# Patient Record
Sex: MALE | Race: BLACK OR AFRICAN AMERICAN | NOT HISPANIC OR LATINO | Employment: OTHER | URBAN - METROPOLITAN AREA
[De-identification: names, ages, dates, MRNs, and addresses within clinical notes are randomized per-mention and may not be internally consistent; named-entity substitution may affect disease eponyms.]

---

## 2017-06-21 PROCEDURE — 99291 CRITICAL CARE FIRST HOUR: CPT | Mod: ,,, | Performed by: EMERGENCY MEDICINE

## 2017-06-21 PROCEDURE — 99291 CRITICAL CARE FIRST HOUR: CPT | Mod: 25

## 2017-06-21 PROCEDURE — 82962 GLUCOSE BLOOD TEST: CPT

## 2017-06-21 PROCEDURE — 96374 THER/PROPH/DIAG INJ IV PUSH: CPT

## 2017-06-21 PROCEDURE — 93005 ELECTROCARDIOGRAM TRACING: CPT

## 2017-06-22 ENCOUNTER — HOSPITAL ENCOUNTER (INPATIENT)
Facility: HOSPITAL | Age: 82
LOS: 7 days | Discharge: NURSING, REHAB OR LONG-TERM CARE | DRG: 064 | End: 2017-06-29
Attending: EMERGENCY MEDICINE | Admitting: PSYCHIATRY & NEUROLOGY
Payer: MEDICARE

## 2017-06-22 DIAGNOSIS — E11.42 TYPE 2 DIABETES MELLITUS WITH DIABETIC POLYNEUROPATHY, WITH LONG-TERM CURRENT USE OF INSULIN: Primary | ICD-10-CM

## 2017-06-22 DIAGNOSIS — I42.9 CARDIOMYOPATHY: ICD-10-CM

## 2017-06-22 DIAGNOSIS — G93.40 ENCEPHALOPATHY: ICD-10-CM

## 2017-06-22 DIAGNOSIS — Z79.4 TYPE 2 DIABETES MELLITUS WITH DIABETIC POLYNEUROPATHY, WITH LONG-TERM CURRENT USE OF INSULIN: Primary | ICD-10-CM

## 2017-06-22 DIAGNOSIS — F03.90 DEMENTIA WITHOUT BEHAVIORAL DISTURBANCE, UNSPECIFIED DEMENTIA TYPE: ICD-10-CM

## 2017-06-22 DIAGNOSIS — R29.810 FACIAL DROOP: ICD-10-CM

## 2017-06-22 DIAGNOSIS — G81.94 HEMIPARESIS, LEFT: ICD-10-CM

## 2017-06-22 DIAGNOSIS — I63.331 THROMBOTIC STROKE INVOLVING RIGHT POSTERIOR CEREBRAL ARTERY: ICD-10-CM

## 2017-06-22 DIAGNOSIS — F02.80 LATE ONSET ALZHEIMER'S DISEASE WITHOUT BEHAVIORAL DISTURBANCE: ICD-10-CM

## 2017-06-22 DIAGNOSIS — G81.94 ACUTE LEFT HEMIPARESIS: ICD-10-CM

## 2017-06-22 DIAGNOSIS — G30.1 LATE ONSET ALZHEIMER'S DISEASE WITHOUT BEHAVIORAL DISTURBANCE: ICD-10-CM

## 2017-06-22 PROBLEM — G93.6 CYTOTOXIC CEREBRAL EDEMA: Status: ACTIVE | Noted: 2017-06-22

## 2017-06-22 PROBLEM — I63.81 THALAMIC INFARCT, ACUTE: Status: ACTIVE | Noted: 2017-06-22

## 2017-06-22 PROBLEM — I67.89 CEREBRAL MICROVASCULAR DISEASE: Status: ACTIVE | Noted: 2017-06-22

## 2017-06-22 PROBLEM — I10 ESSENTIAL HYPERTENSION: Status: ACTIVE | Noted: 2017-06-22

## 2017-06-22 PROBLEM — I51.89 COMBINED SYSTOLIC AND DIASTOLIC CARDIAC DYSFUNCTION: Status: ACTIVE | Noted: 2017-06-22

## 2017-06-22 LAB
ALBUMIN SERPL BCP-MCNC: 3.4 G/DL
ALP SERPL-CCNC: 184 U/L
ALT SERPL W/O P-5'-P-CCNC: 18 U/L
ANION GAP SERPL CALC-SCNC: 6 MMOL/L
APTT BLDCRRT: 21.8 SEC
AST SERPL-CCNC: 25 U/L
BACTERIA #/AREA URNS AUTO: NORMAL /HPF
BASOPHILS # BLD AUTO: 0.02 K/UL
BASOPHILS NFR BLD: 0.3 %
BILIRUB SERPL-MCNC: 0.7 MG/DL
BILIRUB UR QL STRIP: NEGATIVE
BUN SERPL-MCNC: 17 MG/DL
BUN SERPL-MCNC: 20 MG/DL (ref 6–30)
CALCIUM SERPL-MCNC: 9.4 MG/DL
CHLORIDE SERPL-SCNC: 102 MMOL/L (ref 95–110)
CHLORIDE SERPL-SCNC: 109 MMOL/L
CHOLEST/HDLC SERPL: 3.2 {RATIO}
CK MB SERPL-MCNC: 2.6 NG/ML
CK MB SERPL-RTO: 2.7 %
CK SERPL-CCNC: 95 U/L
CLARITY UR REFRACT.AUTO: CLEAR
CO2 SERPL-SCNC: 24 MMOL/L
COLOR UR AUTO: YELLOW
CREAT SERPL-MCNC: 1.4 MG/DL (ref 0.5–1.4)
CREAT SERPL-MCNC: 1.5 MG/DL (ref 0.5–1.4)
CREAT SERPL-MCNC: 1.6 MG/DL
DIASTOLIC DYSFUNCTION: YES
DIFFERENTIAL METHOD: ABNORMAL
EOSINOPHIL # BLD AUTO: 0.1 K/UL
EOSINOPHIL NFR BLD: 1 %
ERYTHROCYTE [DISTWIDTH] IN BLOOD BY AUTOMATED COUNT: 12.7 %
EST. GFR  (AFRICAN AMERICAN): 45.1 ML/MIN/1.73 M^2
EST. GFR  (NON AFRICAN AMERICAN): 39 ML/MIN/1.73 M^2
ESTIMATED AVG GLUCOSE: 235 MG/DL
ESTIMATED PA SYSTOLIC PRESSURE: 18.49
GLUCOSE SERPL-MCNC: 219 MG/DL
GLUCOSE SERPL-MCNC: 219 MG/DL (ref 70–110)
GLUCOSE UR QL STRIP: ABNORMAL
HBA1C MFR BLD HPLC: 9.8 %
HCT VFR BLD AUTO: 41.2 %
HCT VFR BLD CALC: 43 %PCV (ref 36–54)
HDL/CHOLESTEROL RATIO: 31.6 %
HDLC SERPL-MCNC: 133 MG/DL
HDLC SERPL-MCNC: 42 MG/DL
HGB BLD-MCNC: 14.1 G/DL
HGB UR QL STRIP: NEGATIVE
HYALINE CASTS UR QL AUTO: 1 /LPF
INR PPP: 1
INR PPP: 1
KETONES UR QL STRIP: NEGATIVE
LDLC SERPL CALC-MCNC: 70.6 MG/DL
LEUKOCYTE ESTERASE UR QL STRIP: NEGATIVE
LYMPHOCYTES # BLD AUTO: 1.9 K/UL
LYMPHOCYTES NFR BLD: 25.5 %
MCH RBC QN AUTO: 27.8 PG
MCHC RBC AUTO-ENTMCNC: 34.2 %
MCV RBC AUTO: 81 FL
MICROSCOPIC COMMENT: NORMAL
MITRAL VALVE REGURGITATION: ABNORMAL
MONOCYTES # BLD AUTO: 0.4 K/UL
MONOCYTES NFR BLD: 4.8 %
NEUTROPHILS # BLD AUTO: 5 K/UL
NEUTROPHILS NFR BLD: 68.3 %
NITRITE UR QL STRIP: NEGATIVE
NONHDLC SERPL-MCNC: 91 MG/DL
PH UR STRIP: 5 [PH] (ref 5–8)
PLATELET # BLD AUTO: 209 K/UL
PMV BLD AUTO: 10.2 FL
POC IONIZED CALCIUM: 1.27 MMOL/L (ref 1.06–1.42)
POC PTINR: 0.9 (ref 0.9–1.2)
POC PTWBT: 10.9 SEC (ref 9.7–14.3)
POC TCO2 (MEASURED): 29 MMOL/L (ref 23–29)
POCT GLUCOSE: 106 MG/DL (ref 70–110)
POCT GLUCOSE: 123 MG/DL (ref 70–110)
POCT GLUCOSE: 214 MG/DL (ref 70–110)
POCT GLUCOSE: 245 MG/DL (ref 70–110)
POCT GLUCOSE: 245 MG/DL (ref 70–110)
POTASSIUM BLD-SCNC: 4.5 MMOL/L (ref 3.5–5.1)
POTASSIUM SERPL-SCNC: 4.8 MMOL/L
PROT SERPL-MCNC: 7.3 G/DL
PROT UR QL STRIP: NEGATIVE
PROTHROMBIN TIME: 10.6 SEC
PROTHROMBIN TIME: 10.9 SEC
RBC # BLD AUTO: 5.08 M/UL
RBC #/AREA URNS AUTO: 0 /HPF (ref 0–4)
RETIRED EF AND QEF - SEE NOTES: 43 (ref 55–65)
SAMPLE: ABNORMAL
SAMPLE: NORMAL
SAMPLE: NORMAL
SODIUM BLD-SCNC: 142 MMOL/L (ref 136–145)
SODIUM SERPL-SCNC: 139 MMOL/L
SP GR UR STRIP: 1.01 (ref 1–1.03)
TRICUSPID VALVE REGURGITATION: ABNORMAL
TRIGL SERPL-MCNC: 102 MG/DL
TROPONIN I SERPL DL<=0.01 NG/ML-MCNC: <0.006 NG/ML
TSH SERPL DL<=0.005 MIU/L-ACNC: 1.71 UIU/ML
URN SPEC COLLECT METH UR: ABNORMAL
UROBILINOGEN UR STRIP-ACNC: NEGATIVE EU/DL
WBC # BLD AUTO: 7.28 K/UL
WBC #/AREA URNS AUTO: 1 /HPF (ref 0–5)
YEAST UR QL AUTO: NORMAL

## 2017-06-22 PROCEDURE — 85610 PROTHROMBIN TIME: CPT

## 2017-06-22 PROCEDURE — 93010 ELECTROCARDIOGRAM REPORT: CPT | Mod: S$GLB,,, | Performed by: INTERNAL MEDICINE

## 2017-06-22 PROCEDURE — 20600001 HC STEP DOWN PRIVATE ROOM

## 2017-06-22 PROCEDURE — 97166 OT EVAL MOD COMPLEX 45 MIN: CPT

## 2017-06-22 PROCEDURE — 81001 URINALYSIS AUTO W/SCOPE: CPT

## 2017-06-22 PROCEDURE — 99223 1ST HOSP IP/OBS HIGH 75: CPT | Mod: GC,,, | Performed by: PSYCHIATRY & NEUROLOGY

## 2017-06-22 PROCEDURE — 85025 COMPLETE CBC W/AUTO DIFF WBC: CPT

## 2017-06-22 PROCEDURE — 92523 SPEECH SOUND LANG COMPREHEN: CPT

## 2017-06-22 PROCEDURE — G8997 SWALLOW GOAL STATUS: HCPCS | Mod: CH

## 2017-06-22 PROCEDURE — 63600175 PHARM REV CODE 636 W HCPCS

## 2017-06-22 PROCEDURE — 63600175 PHARM REV CODE 636 W HCPCS: Performed by: NURSE PRACTITIONER

## 2017-06-22 PROCEDURE — 80053 COMPREHEN METABOLIC PANEL: CPT

## 2017-06-22 PROCEDURE — 97162 PT EVAL MOD COMPLEX 30 MIN: CPT

## 2017-06-22 PROCEDURE — 82553 CREATINE MB FRACTION: CPT

## 2017-06-22 PROCEDURE — 83036 HEMOGLOBIN GLYCOSYLATED A1C: CPT

## 2017-06-22 PROCEDURE — 25000003 PHARM REV CODE 250: Performed by: PSYCHIATRY & NEUROLOGY

## 2017-06-22 PROCEDURE — 99900035 HC TECH TIME PER 15 MIN (STAT)

## 2017-06-22 PROCEDURE — G8996 SWALLOW CURRENT STATUS: HCPCS | Mod: CI

## 2017-06-22 PROCEDURE — 85610 PROTHROMBIN TIME: CPT | Mod: 91

## 2017-06-22 PROCEDURE — 84443 ASSAY THYROID STIM HORMONE: CPT

## 2017-06-22 PROCEDURE — 97535 SELF CARE MNGMENT TRAINING: CPT

## 2017-06-22 PROCEDURE — 25000003 PHARM REV CODE 250: Performed by: NURSE PRACTITIONER

## 2017-06-22 PROCEDURE — 80061 LIPID PANEL: CPT

## 2017-06-22 PROCEDURE — 85730 THROMBOPLASTIN TIME PARTIAL: CPT

## 2017-06-22 PROCEDURE — 84484 ASSAY OF TROPONIN QUANT: CPT

## 2017-06-22 PROCEDURE — 99222 1ST HOSP IP/OBS MODERATE 55: CPT | Mod: ,,, | Performed by: NURSE PRACTITIONER

## 2017-06-22 PROCEDURE — 25500020 PHARM REV CODE 255: Performed by: EMERGENCY MEDICINE

## 2017-06-22 PROCEDURE — 97803 MED NUTRITION INDIV SUBSEQ: CPT

## 2017-06-22 PROCEDURE — 87086 URINE CULTURE/COLONY COUNT: CPT

## 2017-06-22 PROCEDURE — 82565 ASSAY OF CREATININE: CPT

## 2017-06-22 PROCEDURE — 93306 TTE W/DOPPLER COMPLETE: CPT | Mod: 26,,, | Performed by: INTERNAL MEDICINE

## 2017-06-22 PROCEDURE — 93306 TTE W/DOPPLER COMPLETE: CPT

## 2017-06-22 PROCEDURE — 97530 THERAPEUTIC ACTIVITIES: CPT

## 2017-06-22 PROCEDURE — 92610 EVALUATE SWALLOWING FUNCTION: CPT

## 2017-06-22 RX ORDER — IBUPROFEN 200 MG
24 TABLET ORAL
Status: DISCONTINUED | OUTPATIENT
Start: 2017-06-22 | End: 2017-06-29 | Stop reason: HOSPADM

## 2017-06-22 RX ORDER — LISINOPRIL 5 MG/1
5 TABLET ORAL DAILY
Status: DISCONTINUED | OUTPATIENT
Start: 2017-06-22 | End: 2017-06-23

## 2017-06-22 RX ORDER — ENOXAPARIN SODIUM 100 MG/ML
40 INJECTION SUBCUTANEOUS EVERY 24 HOURS
Status: DISCONTINUED | OUTPATIENT
Start: 2017-06-22 | End: 2017-06-29 | Stop reason: HOSPADM

## 2017-06-22 RX ORDER — ATORVASTATIN CALCIUM 20 MG/1
40 TABLET, FILM COATED ORAL DAILY
Status: DISCONTINUED | OUTPATIENT
Start: 2017-06-22 | End: 2017-06-29 | Stop reason: HOSPADM

## 2017-06-22 RX ORDER — METOPROLOL SUCCINATE 25 MG/1
25 TABLET, EXTENDED RELEASE ORAL DAILY
Status: DISCONTINUED | OUTPATIENT
Start: 2017-06-22 | End: 2017-06-29 | Stop reason: HOSPADM

## 2017-06-22 RX ORDER — NAPROXEN SODIUM 220 MG/1
81 TABLET, FILM COATED ORAL DAILY
Status: DISCONTINUED | OUTPATIENT
Start: 2017-06-22 | End: 2017-06-22

## 2017-06-22 RX ORDER — CLOPIDOGREL BISULFATE 75 MG/1
300 TABLET ORAL ONCE
Status: COMPLETED | OUTPATIENT
Start: 2017-06-22 | End: 2017-06-22

## 2017-06-22 RX ORDER — ACETAMINOPHEN 325 MG/1
650 TABLET ORAL EVERY 6 HOURS PRN
Status: DISCONTINUED | OUTPATIENT
Start: 2017-06-22 | End: 2017-06-26

## 2017-06-22 RX ORDER — MIDAZOLAM HYDROCHLORIDE 1 MG/ML
0.5 INJECTION INTRAMUSCULAR; INTRAVENOUS
Status: COMPLETED | OUTPATIENT
Start: 2017-06-22 | End: 2017-06-22

## 2017-06-22 RX ORDER — LABETALOL HYDROCHLORIDE 5 MG/ML
10 INJECTION, SOLUTION INTRAVENOUS EVERY 6 HOURS PRN
Status: DISCONTINUED | OUTPATIENT
Start: 2017-06-22 | End: 2017-06-29 | Stop reason: HOSPADM

## 2017-06-22 RX ORDER — MEMANTINE HYDROCHLORIDE 10 MG/1
10 TABLET ORAL 2 TIMES DAILY
Status: DISCONTINUED | OUTPATIENT
Start: 2017-06-22 | End: 2017-06-29 | Stop reason: HOSPADM

## 2017-06-22 RX ORDER — POLYETHYLENE GLYCOL 3350 17 G/17G
17 POWDER, FOR SOLUTION ORAL DAILY PRN
Status: DISCONTINUED | OUTPATIENT
Start: 2017-06-22 | End: 2017-06-29 | Stop reason: HOSPADM

## 2017-06-22 RX ORDER — DONEPEZIL HYDROCHLORIDE 5 MG/1
10 TABLET, FILM COATED ORAL DAILY
Status: DISCONTINUED | OUTPATIENT
Start: 2017-06-22 | End: 2017-06-29 | Stop reason: HOSPADM

## 2017-06-22 RX ORDER — MIDAZOLAM HYDROCHLORIDE 1 MG/ML
INJECTION INTRAMUSCULAR; INTRAVENOUS
Status: COMPLETED
Start: 2017-06-22 | End: 2017-06-22

## 2017-06-22 RX ORDER — CLOPIDOGREL BISULFATE 75 MG/1
75 TABLET ORAL DAILY
Status: DISCONTINUED | OUTPATIENT
Start: 2017-06-23 | End: 2017-06-29 | Stop reason: HOSPADM

## 2017-06-22 RX ORDER — IBUPROFEN 200 MG
16 TABLET ORAL
Status: DISCONTINUED | OUTPATIENT
Start: 2017-06-22 | End: 2017-06-29 | Stop reason: HOSPADM

## 2017-06-22 RX ORDER — ONDANSETRON 2 MG/ML
4 INJECTION INTRAMUSCULAR; INTRAVENOUS EVERY 12 HOURS PRN
Status: DISCONTINUED | OUTPATIENT
Start: 2017-06-22 | End: 2017-06-29 | Stop reason: HOSPADM

## 2017-06-22 RX ORDER — AMLODIPINE BESYLATE 10 MG/1
10 TABLET ORAL DAILY
Status: DISCONTINUED | OUTPATIENT
Start: 2017-06-22 | End: 2017-06-29 | Stop reason: HOSPADM

## 2017-06-22 RX ORDER — SODIUM CHLORIDE 0.9 % (FLUSH) 0.9 %
3 SYRINGE (ML) INJECTION EVERY 8 HOURS
Status: DISCONTINUED | OUTPATIENT
Start: 2017-06-22 | End: 2017-06-29 | Stop reason: HOSPADM

## 2017-06-22 RX ORDER — INSULIN ASPART 100 [IU]/ML
0-5 INJECTION, SOLUTION INTRAVENOUS; SUBCUTANEOUS
Status: DISCONTINUED | OUTPATIENT
Start: 2017-06-22 | End: 2017-06-29 | Stop reason: HOSPADM

## 2017-06-22 RX ORDER — GLUCAGON 1 MG
1 KIT INJECTION
Status: DISCONTINUED | OUTPATIENT
Start: 2017-06-22 | End: 2017-06-29 | Stop reason: HOSPADM

## 2017-06-22 RX ADMIN — MIDAZOLAM HYDROCHLORIDE 0.5 MG: 1 INJECTION, SOLUTION INTRAMUSCULAR; INTRAVENOUS at 03:06

## 2017-06-22 RX ADMIN — Medication 3 ML: at 02:06

## 2017-06-22 RX ADMIN — IOHEXOL 100 ML: 350 INJECTION, SOLUTION INTRAVENOUS at 01:06

## 2017-06-22 RX ADMIN — ENOXAPARIN SODIUM 40 MG: 100 INJECTION SUBCUTANEOUS at 06:06

## 2017-06-22 RX ADMIN — AMLODIPINE BESYLATE 10 MG: 10 TABLET ORAL at 10:06

## 2017-06-22 RX ADMIN — ATORVASTATIN CALCIUM 40 MG: 20 TABLET, FILM COATED ORAL at 10:06

## 2017-06-22 RX ADMIN — INSULIN ASPART 1 UNITS: 100 INJECTION, SOLUTION INTRAVENOUS; SUBCUTANEOUS at 09:06

## 2017-06-22 RX ADMIN — DONEPEZIL HYDROCHLORIDE 10 MG: 5 TABLET, FILM COATED ORAL at 10:06

## 2017-06-22 RX ADMIN — MEMANTINE HYDROCHLORIDE 10 MG: 10 TABLET, FILM COATED ORAL at 10:06

## 2017-06-22 RX ADMIN — ASPIRIN 81 MG CHEWABLE TABLET 81 MG: 81 TABLET CHEWABLE at 10:06

## 2017-06-22 RX ADMIN — LISINOPRIL 5 MG: 5 TABLET ORAL at 10:06

## 2017-06-22 RX ADMIN — Medication 3 ML: at 05:06

## 2017-06-22 RX ADMIN — MIDAZOLAM 0.5 MG: 1 INJECTION INTRAMUSCULAR; INTRAVENOUS at 04:06

## 2017-06-22 RX ADMIN — MEMANTINE HYDROCHLORIDE 10 MG: 10 TABLET, FILM COATED ORAL at 08:06

## 2017-06-22 RX ADMIN — Medication 3 ML: at 09:06

## 2017-06-22 RX ADMIN — CLOPIDOGREL 300 MG: 75 TABLET, FILM COATED ORAL at 06:06

## 2017-06-22 RX ADMIN — INSULIN ASPART 2 UNITS: 100 INJECTION, SOLUTION INTRAVENOUS; SUBCUTANEOUS at 06:06

## 2017-06-22 RX ADMIN — MIDAZOLAM HYDROCHLORIDE 0.5 MG: 1 INJECTION, SOLUTION INTRAMUSCULAR; INTRAVENOUS at 04:06

## 2017-06-22 RX ADMIN — METOPROLOL SUCCINATE 25 MG: 25 TABLET, EXTENDED RELEASE ORAL at 10:06

## 2017-06-22 NOTE — ED NOTES
Emmett Perez, a 84 y.o. male presents to the ED bed 6      Chief Complaint   Patient presents with    Cerebrovascular Accident     Pts family reports left sided weakness and facial droop that began around 2230     Wife states that pt was stating that he was very tired around 1900 and he wouldn't open his eyes and leaning towards the left side but was able to raise up his left arm. Around 2200, she noticed pt drooling, increasingly weak, unable to stand and slurred speech. Pt arouses to voice and stimulation. Left sided weakness present with left facial droop. Wife states that the slurred speech has gotten better. Pt only oriented to person and place. Denies headaches.       Review of patient's allergies indicates:  No Known Allergies  Past Medical History:   Diagnosis Date    Alzheimer disease     CKD (chronic kidney disease) stage 3, GFR 30-59 ml/min     Hypertension     Seizure     Type 2 diabetes mellitus with diabetic neuropathy

## 2017-06-22 NOTE — HOSPITAL COURSE
85 y/o male with PMHx of HTN, DM, HLD, Dementia, CKD, and CHF who presented with left sided weakness and facial droop. Patient was not candidate for TPA candidate as out of window and not an IR candidate as minimal symptoms. MRI confirmed R thalamic infarct likely caused by small vessel disease. Echo revealed an EF of 43% with LV hypokinesis, but no LA enlargement.    Patient passed MBSS. He was approved for a regular diet and thin liquids with crushed meds and no straws. Patient with A1C of 9.8, but had low caloric intake. Insulin was adjusted accordingly and SSI was ordered. Daughter expressed concern about multiple issues including diabetes, lethargy, dementia diagnosis, caloric intake, and diarrhea. Ordered calorie counts and stool studies. Stool was negative for C. Diff, e coli. Stool culture with no growth.  Ambulatory referral sent to neuropsych and endocrine for outpatient follow up. Explained to the patient's daughter that lethargy is common post stroke and recommended stimulating patient. Nurse was instructed to transfer patient into the chair throughout the day.    Patients lethargy improved and he was more participatory with therapy. He was accepted to [facility]. He tolerated a regular diet, but only consumed about 25% of his meals. Boost was added to his dietary regimen to increase his caloric intake. Patient remained neurologically stable and at discharge he was experiencing LSW and dysarthria. For secondary stroke prevention, he will continue plavix and atorvastatin 40. He will resume all other home medications. He will be discharged to [facility] and will follow up in stroke clinic in 4-6 weeks.

## 2017-06-22 NOTE — H&P
[facility]  Vascular Neurology  Comprehensive Stroke Center  History & Physical    Inpatient consult to Vascular (Stroke) Neurology  Consult performed by: AMEENA CRAWFORD  Consult ordered by: AZALEA ARIAS  Reason for consult: left weakness        Subjective:     History of Present Illness:  83 y/o male who was sitting in chair while family was at work. Around 1900 he started to c/o being very tired so took a nap. At 2000 he was checked up on and still c/o being tired and generalized weak but weaker on the left. Around 2200 family noticed facial droop and drooling so she decided to bring to ED to be evaluated. Patient has baseline dementia so unable to answer question correctly.   Patient walks with a cane due to DPN.   Upon evaluation patient states both his legs feel heavy and arm but the left side is weaker than the right. Facial droop on left present  Risk factors HTN, DM, CKD         Past Medical History:   Diagnosis Date    Alzheimer disease     CKD (chronic kidney disease) stage 3, GFR 30-59 ml/min     Hypertension     Seizure     Type 2 diabetes mellitus with diabetic neuropathy      Past Surgical History:   Procedure Laterality Date    CERVICAL SPINE SURGERY  2007    CORONARY ANGIOPLASTY WITH STENT PLACEMENT      thyroid nodule  1997     No family history on file.  Social History   Substance Use Topics    Smoking status: Never Smoker    Smokeless tobacco: Not on file    Alcohol use Not on file     Review of patient's allergies indicates:  No Known Allergies  Medications: I have reviewed the current medication administration record.      (Not in a hospital admission)    Review of Systems   Constitutional: Positive for fatigue. Negative for chills and fever.   HENT: Negative for ear discharge and ear pain.    Eyes: Negative for pain and itching.   Respiratory: Negative for cough and shortness of breath.    Cardiovascular: Negative for chest pain and leg swelling.    Gastrointestinal: Negative for abdominal distention and abdominal pain.   Genitourinary: Negative for difficulty urinating and dysuria.   Musculoskeletal: Positive for arthralgias and gait problem.   Skin: Negative for rash and wound.   Neurological: Positive for facial asymmetry and weakness.   Psychiatric/Behavioral: Positive for confusion. Negative for agitation.     Objective:     Vital Signs (Most Recent):  Temp: 98.9 °F (37.2 °C) (06/22/17 0003)  Pulse: 61 (06/22/17 0027)  Resp: 18 (06/22/17 0027)  BP: (!) 175/85 (06/22/17 0017)  SpO2: 95 % (06/22/17 0027)    Vital Signs Range (Last 24H):  Temp:  [98.9 °F (37.2 °C)]   Pulse:  [61-67]   Resp:  [18]   BP: (143-175)/(75-85)   SpO2:  [95 %-98 %]     Physical Exam   Constitutional: He appears well-developed and well-nourished.   HENT:   Head: Normocephalic and atraumatic.   Eyes: Pupils are equal, round, and reactive to light.   Neck: Normal range of motion.   Cardiovascular: Normal rate.    Pulmonary/Chest: Effort normal.   Abdominal: Soft.   Musculoskeletal: Normal range of motion.   Neurological: He is alert. GCS eye subscore is 4 - spontaneous. GCS verbal subscore is 4 - confused. GCS motor subscore is 6 - obeys commands.   Oriented x 2   Skin: Skin is warm and dry.   Nursing note and vitals reviewed.      Neurological Exam:   LOC: alert and follows requests  Language: No aphasia  Speech: No dysarthria  Orientation: Person, Place, Not oriented to time  Memory: Recent memory intact, Remote memory intact, Abnormalities: No age correct and No month correct  Visual Fields (CN II): Full  EOM (CN III, IV, VI): Full/intact  Oculocephalics: normal  Pupils (CN III, IV, VI): PERRL  Facial Sensation (CN V): Symmetric, Corneal reflex intact  Facial Movement (CN VII): lower weakness left lower  Hearing (CN VIII): intact bilaterally  Gag Reflex (CN IX, X): normal/symmetric  Shoulder/Neck (CN XI): SCM-Left: Normal ; SCM-Right: Normal ; Shoulder Shrug:  Normal/Symetric  Tongue (CN XII): to midline  Reflexes: flexor plantar responses bilaterally  Motor*: Arm Left:  Paretic:  4/5, Leg Left:   Paretic:  4/5, Arm Right:   Normal (5/5), Leg Right:   Normal (5/5)  Cerebellar*: Not testable due to laying on stretcher  Sensation: intact to light touch, temperature and vibration  Tone: Arm-Left: normal; Leg-Left: normal; Arm-Right: normal; Leg-Right: normal    NIH Stroke Scale:  Interval: baseline (upon arrival/admit)  Level of Consciousness: 0 - alert  LOC Questions: 1 - answers one correctly  LOC Commands: 0 - performs both correctly  Best Gaze: 0 - normal  Visual: 0 - no visual loss  Facial Palsy: 1 - minor  Motor Left Arm: 1 - drift  Motor Right Arm: 0 - no drift  Motor Left Le - can't resist gravity  Motor Right Le - can't resist gravity  Limb Ataxia: 0 - absent  Sensory: 0 - normal  Best Language: 0 - no aphasia  Dysarthria: 0 - normal articulation  Extinction and Inattention: 0 - no neglect  NIH Stroke Scale Total: 7  [place] Coma Scale:  Best Eye Response: 4 - spontaneous  Best Motor Response: 6 - obeys commands  Best Verbal Response: 4 - confused  Jennifer Coma Scale Total: 14  Modified Ayad Scale:   Timeline: Prior to symptoms onset  Modified [place] Score: 3 - moderate disability        Laboratory:  CMP:   Recent Labs  Lab 17   CALCIUM 9.4   ALBUMIN 3.4*   PROT 7.3      K 4.8   CO2 24      BUN 17   CREATININE 1.6*   ALKPHOS 184*   ALT 18   AST 25   BILITOT 0.7     CBC:   Recent Labs  Lab 17   WBC 7.28  --    RBC 5.08  --    HGB 14.1  --    HCT 41.2 43     --    MCV 81*  --    MCH 27.8  --    MCHC 34.2  --      Lipid Panel:   Recent Labs  Lab 17   CHOL 133   LDLCALC 70.6   HDL 42   TRIG 102     Coagulation:   Recent Labs  Lab 17   INR 1.0     Platelet Aggregation Study: No results for input(s): PLTAGG, PLTAGINTERP, PLTAGREGLACO, ADPPLTAGGREG in the last 168 hours.  Hgb A1C:  No results for input(s): HGBA1C in the last 168 hours.  TSH:   Recent Labs  Lab 06/22/17  0017   TSH 1.710       Diagnostic Results:  Brain Imaging:   CT Head w/o contrast 6-22-17 results:  No acute hemorrhage or major vascular territory infarct.  Consider MRI of the brain for further evaluation.    Chronic microvascular ischemic changes and age-appropriate generalized cerebral volume loss, similar to MRI brain 11/2016.    Cardiac Evaluation:  EKG/Telemetry: 6-22-17 unconfirmed results:  Normal sinus rhythm  Inferior infarct ,age undetermined  Anteroseptal infarct ,age undetermined    Assessment/Plan:     Patient is a 84 y.o. year old male with: stroke w/u in progress also looking at possible medical cause of symtpoms    Essential hypertension    Stroke risk factor  SBP <220 for now  Home meds        Type 2 diabetes mellitus with diabetic polyneuropathy, with long-term current use of insulin    Stroke risk factor  HgBA1c ordered   SSI        Chronic kidney disease, stage III (moderate)    Stroke risk factor        Facial droop    Aggressive therapy        * Hemiparesis, left    Antithrombotic: aspirin 81 mg daily  Statin: Lipitor 40 mg daily  Diagnostics: CTA head and neck, MRI Brain, 2dEcho, HgbA1c, lipid, ua and culture  Therapy: PT/OT/ST  VTE: SCD's and lovenox 40 mg daily SC            Thrombolysis Candidate? No  1. Contraindications: Outside of treament window     Interventional Revascularization Candidate?  No; No significant neurological deficit    Research Candidate? No      Elaina Estevez NP  [facility]  Department of Vascular Neurology   [facility]

## 2017-06-22 NOTE — PT/OT/SLP EVAL
Speech Language Pathology Evaluation    Emmett Perez   MRN: [id number]   Admitting Diagnosis: Hemiparesis, left    Diet recommendations: Solid Diet Level: Regular  Liquid Diet Level: Thin Feed only when awake/alert, HOB to 90 degrees, Small bites/sips, Alternating bites/sips and 1 bite/sip at a time    SLP Treatment Date: 06/22/17  Speech Start Time: 1010     Speech Stop Time: 1027     Speech Total (min): 17 min       TREATMENT BILLABLE MINUTES:  Eval 9  and Eval Swallow and Oral Function 8    Diagnosis: Hemiparesis, left      Past Medical History:   Diagnosis Date    Alzheimer disease     CKD (chronic kidney disease) stage 3, GFR 30-59 ml/min     Hypertension     Seizure     Stroke     Type 2 diabetes mellitus with diabetic neuropathy      Past Surgical History:   Procedure Laterality Date    CERVICAL SPINE SURGERY  2007    CORONARY ANGIOPLASTY WITH STENT PLACEMENT      thyroid nodule  1997       Has the patient been evaluated by SLP for swallowing? : Yes  Keep patient NPO?: No   General Precautions: Standard, aspiration, fall          Social Hx: Patient lives with spouse.    Prior diet: Regular/thin.    Occupational/hobbies/homemaking: retired .    Subjective:  Awake, eyes closed throughout eval    Pain/Comfort  Pain Rating 1: 0/10  Pain Rating Post-Intervention 1: 0/10        Cognitive Status:  Behavioral Observations: alert and confused-  Memory and Orientation: oriented to person only, immediate 5 numbers/5 words  Attention: imapired.  Problem Solving: Conclusions: 1/3 ind'ly, 2/3 cued  Reasoning: able to compare with min cues, unable to contrast     Pragmatics: topic maintenance problems  Executive Function: insight/awareness impaired    Language: tangential and delayed    Auditory Comprehension: answers yes/no questions 100% complex and able to follow commands 100% with min cues, 75% ind'ly    Verbal Expression: WFL, naming 100%    Motor Speech: Wfl    Voice: wfl      Bedside Swallow  Eval:  Consistencies Assessed: Thin liquids x6 oz cup/straw Puree x1 and Solids x3    Oral Phase: WFL  Pharyngeal Phase: no overt clinical  signs/symptoms of aspiration and no overt clinical signs/symptoms of pharyngeal dysphagia            Assessment:  Emmett Perez is a 84 y.o. male with a SLP diagnosis of Cognitive-Linguistic Impairment.          Discharge recommendations: Discharge Facility/Level Of Care Needs: rehabilitation facility     Goals:    SLP Goals        Problem: SLP Goal    Goal Priority Disciplines Outcome   SLP Goal     SLP    Description:  Speech Language Pathology Goals  Goals expected to be met by 6/29    1. Pt will tolerate regular diet/thin liquids without overt s/s of aspiration  2. Pt will orient to place and time with mod cues  3. Pt will complete problem solving tasks with 70% accuracy and min cues  4. Pt will follow complex commands with 80% accuracy   5. Pt will participate in ongoing assessment of reading,writing and visual spatial aspects.                          Plan:   Patient to be seen Therapy Frequency: 5 x/week   Plan of Care expires: 07/21/17  Plan of Care reviewed with: patient, spouse            SLP G-Codes  Functional Assessment Tool Used: NOMS  Score: 6  Functional Limitations: Swallowing  Swallow Current Status (): CI  Swallow Goal Status (): GENIE Yañez CCC-SLP  06/22/2017

## 2017-06-22 NOTE — ED NOTES
Pt identifiers Emmett Pozo and correct  APPEARANCE: Pt resting comfortably, in no acute distress, pt is clean and well groomed, clothing properly fastened  SKIN: Skin warm, dry and intact, normal skin turgor, moist mucus membranes  RESPIRATORY: Airway is open and patent, respirations are spontaneous, even and unlabored, normal effort and rate  CARDIAC: Normal rate and rhythm, no peripheral edema noted, capillary refill < 3 seconds, bilateral radial pulses 2+  ABDOMEN: Soft, nontender, nondistended. Bowel sounds present   MUSCULOSKELETAL: No obvious deformities.

## 2017-06-22 NOTE — SUBJECTIVE & OBJECTIVE
Neurologic Chief Complaint: left sided weakness    Subjective:     Interval History: Patient is seen for follow-up neurological assessment and treatment recommendations:   No acute events overnight.     HPI, Past Medical, Family, and Social History remains the same as documented in the initial encounter.     Review of Systems   Constitutional: Negative for fatigue and fever.   Respiratory: Negative for shortness of breath.    Cardiovascular: Negative for chest pain.   Neurological: Positive for weakness.     Scheduled Meds:   amlodipine  10 mg Oral Daily    aspirin  81 mg Oral Daily    atorvastatin  40 mg Oral Daily    donepezil  10 mg Oral Daily    enoxaparin  40 mg Subcutaneous Daily    lisinopril  5 mg Oral Daily    memantine  10 mg Oral BID    metoprolol succinate  25 mg Oral Daily    sodium chloride 0.9%  3 mL Intravenous Q8H     Continuous Infusions:   sodium chloride 0.9%       PRN Meds:acetaminophen, dextrose 50%, dextrose 50%, glucagon (human recombinant), glucose, glucose, insulin aspart, labetalol, ondansetron, polyethylene glycol, sodium chloride 0.9%    Objective:     Vital Signs (Most Recent):  Temp: 97.4 °F (36.3 °C) (06/22/17 1236)  Pulse: (!) 51 (06/22/17 1500)  Resp: 17 (06/22/17 1236)  BP: (!) 193/92 (06/22/17 1236)  SpO2: 99 % (06/22/17 1236)  BP Location: Left arm    Vital Signs Range (Last 24H):  Temp:  [97.1 °F (36.2 °C)-98.9 °F (37.2 °C)]   Pulse:  [51-74]   Resp:  [15-18]   BP: (142-193)/(72-95)   SpO2:  [95 %-99 %]   BP Location: Left arm    Physical Exam   Constitutional: He appears well-developed and well-nourished.   HENT:   Head: Normocephalic and atraumatic.   Eyes: Pupils are equal, round, and reactive to light.   Cardiovascular: Normal rate.        Neurological Exam:   LOC: alert and follows requests  Language: No aphasia  Speech: Dysarthria  Orientation: Person, Not oriented to time  Visual Fields (CN II): Full  EOM (CN III, IV, VI): Full/intact  Pupils (CN III, IV, VI):  PERRL  Facial Sensation (CN V): Symmetric  Facial Movement (CN VII): lower weakness left lower  Shoulder/Neck (CN XI): SCM-Left: Normal ; SCM-Right: Normal ; Shoulder Shrug: Normal/Symetric  Tongue (CN XII): to midline  Motor*: Arm Left:  Paretic:  4/5, Leg Left:   Paretic:  3/5, Arm Right:   Normal (5/5), Leg Right:   Normal (5/5)  Cerebellar*: Normal limb  Sensation: intact to light touch, temperature and vibration  Tone: Arm-Left: normal; Leg-Left: normal; Arm-Right: normal; Leg-Right: normal    NIH Stroke Scale:    Level of Consciousness: 0 - alert  LOC Questions: 1 - answers one correctly (at cognitive baseline from dementia)  LOC Commands: 0 - performs both correctly  Best Gaze: 0 - normal  Visual: 0 - no visual loss  Facial Palsy: 2 - partial  Motor Left Arm: 1 - drift  Motor Right Arm: 0 - no drift  Motor Left Le - can't resist gravity  Motor Right Le - no drift  Limb Ataxia: 0 - absent  Sensory: 0 - normal  Best Language: 0 - no aphasia  Dysarthria: 1 - mild to moderate dysarthria  Extinction and Inattention: 0 - no neglect  NIH Stroke Scale Total: 7      Laboratory:  CMP:   Recent Labs  Lab 17  001   CALCIUM 9.4   ALBUMIN 3.4*   PROT 7.3      K 4.8   CO2 24      BUN 17   CREATININE 1.6*   ALKPHOS 184*   ALT 18   AST 25   BILITOT 0.7     BMP:   Recent Labs  Lab 17  001      K 4.8      CO2 24   BUN 17   CREATININE 1.6*   CALCIUM 9.4     CBC:   Recent Labs  Lab 17  0017 17  0023   WBC 7.28  --    RBC 5.08  --    HGB 14.1  --    HCT 41.2 43     --    MCV 81*  --    MCH 27.8  --    MCHC 34.2  --      Lipid Panel:   Recent Labs  Lab 17  001   CHOL 133   LDLCALC 70.6   HDL 42   TRIG 102     Coagulation:   Recent Labs  Lab 17  0440   INR 1.0   APTT 21.8     Platelet Aggregation Study: No results for input(s): PLTAGG, PLTAGINTERP, PLTAGREGLACO, ADPPLTAGGREG in the last 168 hours.  Hgb A1C:   Recent Labs  Lab 17  0017   HGBA1C 9.8*      TSH:   Recent Labs  Lab 06/22/17  0017   TSH 1.710       Diagnostic Results:  I have personally reviewed:   Findings:     MRI Brain WO contrast (6/22/17)  Right thalamic infarct. +cytotoxic cerebral edema.

## 2017-06-22 NOTE — ASSESSMENT & PLAN NOTE
-  Encourage mobility, OOB in chair at least 3 hours per day, and ambulation.  -  PT/OT evaluate and treat.  Functional status: pending OT/PT evaluation.   -  SLP speech and cognitive evaluate and treat.   Cognitive/Speech/Language status:  Pending.  Nutrition/Swallow Status:  pending bedside swallow evaluation.    -  Monitor sleep disturbances and establish consistent sleep-wake cycle.  -  Monitor for bowel and bladder dysfunction.    -  Monitor for shoulder pain and subluxation.  -  Monitor for spasticity.  -  Monitor for skin breakdown and pressure ulcers (early mobility, repositioning/weight shifting every 20-30 minutes when sitting, turn patient every 2 hours, proper mattress/overlay and chair cushioning, and pressure relief/heel protector boots)  -  DVT prophylaxis:  Lovenox.  -  Reviewed discharge options with patient (inpatient rehab, SNF, home health therapy, and outpatient therapy).  Encourage participation with therapy.

## 2017-06-22 NOTE — PT/OT/SLP EVAL
Occupational Therapy  Evaluation    Emmett Perez   MRN: [id number]   Admitting Diagnosis: Hemiparesis, left    OT Date of Treatment: 06/22/17   OT Start Time: 0830  OT Stop Time: 0910  OT Total Time (min): 40 min    Billable Minutes:  Evaluation 25  Self Care/Home Management 15    Diagnosis: Hemiparesis, left   Pt admitted with L facial droop/L side weakness; CT of head negative for acute infarct    Past Medical History:   Diagnosis Date    Alzheimer disease     CKD (chronic kidney disease) stage 3, GFR 30-59 ml/min     Hypertension     Seizure     Stroke     Type 2 diabetes mellitus with diabetic neuropathy       Past Surgical History:   Procedure Laterality Date    CERVICAL SPINE SURGERY  2007    CORONARY ANGIOPLASTY WITH STENT PLACEMENT      thyroid nodule  1997       Referring physician: Elaina Estevez NP  Date referred to OT: 6/22/2017    General Precautions: Standard, fall  Orthopedic Precautions: N/A  Braces: N/A    Do you have any cultural, spiritual, Sikh conflicts, given your current situation?: no issues     Patient History:  Living Environment  Lives With:  (Lives with wife in 1 story house with 0 MARA; tub/shower)  Living Environment Comment: Wife works 4:00 pm to midnight; pt is home alone at times.  Uses SPC in the house, rollator or QC out of house.  Equipment Currently Used at Home: bath bench, rollator, cane, straight, cane, quad    Prior level of function:   Bed Mobility/Transfers: independent (Pt indep self care using straight cane except for assist iwth showering)  Grooming: independent  Bathing: needs device and assist (Uses TTB and assist from wife)  Upper Body Dressing: independent (Pt was initiating his self care and picking out his own clothes)  Lower Body Dressing: independent  Toileting: independent  Home Management Skills:  (Pt was dependent all household adls, generally not oriented to time or current events')        Dominant hand: right    Subjective:  Communicated with RN  prior to session.  No issues  Chief Complaint: Pt with no c/o's  Patient/Family stated goals: Pt unable to formulate goals; he did agree with OT poc    Pain/Comfort  Pain Rating 1: 0/10    Objective:       Cognitive Exam:  Oriented to: Person  Follows Commands/attention: Follows one-step commands  Communication: Pt speaking very little, not initiating communication.  His responses are intelligable  Memory:  Impaired STM, Impaired LTM and Poor immediate recall  Safety awareness/insight to disability: impaired  Coping skills/emotional control: flat affect, keeping eyes closed    Visual/perceptual:  Intact    Physical Exam:  Postural examination/scapula alignment: Rounded shoulder and Head forward  Skin integrity: Visible skin intact  Edema: None noted     Sensation:   Intact    Upper Extremity Range of Motion:  Right Upper Extremity: A/PROM WNL  Left Upper Extremity: AROM sh fl 80 deg, abd 90 deg; other jts arom 80%    Upper Extremity Strength:  Right Upper Extremity: 4+/5  Left Upper Extremity: 3-/5 shoulder, 3/5 elbow   Strength: R 4+/5, L 3/5    Fine motor coordination:   Mod impaired L hand, intact R    Gross motor coordination: Mod impaired LUE    Functional Mobility:  Bed Mobility:  Rolling/Turning to Left: Minimum assistance  Scooting/Bridging: Contact Guard Assistance  Supine to Sit: Moderate Assistance  Sit to Supine: Contact Guard Assistance    Transfers:  Sit <> Stand Assistance: Moderate Assistance  Sit <> Stand Assistive Device: No Assistive Device    Functional Ambulation: NT; mod a per PT eval    Activities of Daily Living:  Feeding Level of Assistance: Total assistance (Wife fed him b-fast 2' low level of alertness and attn)    UE Dressing Level of Assistance: Moderate assistance    LE Dressing Level of Assistance: Moderate assistance (Don/doff slipper socks with mod a for sitting balance, no assist for managing the socks.  Mod imp LUE coord/strength and LLE strength.)       Grooming Level of  "Assistance: Minimum assistance (Brushed teeth with min a for balance; impaired L pinch strength, L hand coord, sitting balance)     Toileting Level of Assistance:  (NT; pt had used urinal )            Balance:   Static Sit: POOR+: Needs MINIMAL assist to maintain  Dynamic Sit: FAIR: Cannot move trunk without losing balance  Static Stand: POOR+: Needs MINIMAL assist to maintain      Therapeutic Activities and Exercises:  -Worked on integration of BUE in bilat task with toothbrushing on eob; pt required min a for balance  -Worked on BLE and BUE arom via doffing/doffing socks on eob; pt required varying mod a to max a for balance during this task    AM-PAC 6 CLICK ADL  How much help from another person does this patient currently need?  1 = Unable, Total/Dependent Assistance  2 = A lot, Maximum/Moderate Assistance  3 = A little, Minimum/Contact Guard/Supervision  4 = None, Modified [place]/Independent    Putting on and taking off regular lower body clothing? : 2  Bathing (including washing, rinsing, drying)?: 2  Toileting, which includes using toilet, bedpan, or urinal? : 2  Putting on and taking off regular upper body clothing?: 2  Taking care of personal grooming such as brushing teeth?: 3  Eating meals?: 3  Total Score: 14    AM-PAC Raw Score CMS "G-Code Modifier Level of Impairment Assistance   6 % Total / Unable   7 - 9 CM 80 - 100% Maximal Assist   10-14 CL 60 - 80% Moderate Assist   15 - 19 CK 40 - 60% Moderate Assist   20 - 22 CJ 20 - 40% Minimal Assist   23 CI 1-20% SBA / CGA   24 CH 0% Independent/ Mod I       Patient left supine with all lines intact and call button in reach    Assessment:  Emmett Perez is a 84 y.o. male with a medical diagnosis of Hemiparesis, left.  The pt is currently at min a to mod a most self-care 2' to impaired AROM LUE/LLE, impaired strength LUE/LLE, impaired static and dynamic sitting balance, impaired static and dynamic standing balance, impaired initiation, impaired " attn to task, impaired communication.  The pt was previously mod indep all aspects of self-care except supervision with bathing.  The pt is home alone for parts of the day.  Pt would benefit from intensive 5x/wk OT in an IPR setting to achieve plof.    Rehab identified problem list/impairments: Rehab identified problem list/impairments: weakness, impaired endurance, impaired functional mobilty, impaired self care skills, impaired balance, visual deficits, impaired cognition, abnormal tone, decreased safety awareness, decreased ROM, impaired coordination, impaired fine motor, gait instability, decreased lower extremity function, decreased upper extremity function, decreased coordination    Rehab potential is good.    Activity tolerance: Excellent    Discharge recommendations: Discharge Facility/Level Of Care Needs: rehabilitation facility     Barriers to discharge: Barriers to Discharge: Decreased caregiver support    Equipment recommendations: bedside commode, wheelchair     GOALS:    Occupational Therapy Goals        Problem: Occupational Therapy Goal    Goal Priority Disciplines Outcome Interventions   Occupational Therapy Goal     OT, PT/OT Ongoing (interventions implemented as appropriate)    Description:  Goals to be met by 7/6/2017:    1.  Sit eob with CG A for dynamic sitting balance during grooming tasks  2.  CG A to complete grooming tasks in supported sitting  3.  Complete UB dressing with min a  4.  Don shorts/underpants or pants with min a  5.  Toilet self with min a  6.  Toilet or BSC transfer with min a                    PLAN:  Patient to be seen 5 x/week to address the above listed problems via self-care/home management, therapeutic activities, therapeutic exercises  Plan of Care expires: 07/21/17  Plan of Care reviewed with: patient, spouse         AIXA Lyman  06/22/2017

## 2017-06-22 NOTE — CONSULTS
[facility]  Physical Medicine & Rehab  Consult Note    Patient Name: Emmett Perez  MRN: [id number]  Admission Date: 6/22/2017  Hospital Length of Stay: 0 days  Collaborating Physician : Camilo Navarro MD    Consults  Subjective:     Principal Problem: Hemiparesis, left    HPI: Emmett Perez is a 84-year-old male with PMHx of Alzheimer disease, CKD, HTN, Seizure, and DMII.  Patient presented to [facility] on 6/22/17 with fatigue, generalized weakness but weaker on L side, and a facial droop.  CTH revealed no acute pathology. TPA was not administered due to outside treatment window. CTA was unremarkable. MRI showed signs of a R thalamic infarct. Likely etiology of stroke is small vessel ischemic disease.    Functional History: Patient lives in [place] with his wife in a single story home with no steps to enter.  Prior to admission, independent with mobility and transfer with use of straight cane.  Independent with most ADLs, but does require assistance with bathing from wife.  DME: bath bench, cane (both straight and quad), rollator.       Hospital Course:   6/22/17: Evaluated by therapy. Bed mobility Luciano-ModA.  Sit to stand Luciano-ModA & RW.  Ambulated 10ft x 2 trials with ModA & RW. UBD ModA and LBD ModA.    Past Medical History:   Diagnosis Date    Alzheimer disease     CKD (chronic kidney disease) stage 3, GFR 30-59 ml/min     Hypertension     Seizure     Stroke     Type 2 diabetes mellitus with diabetic neuropathy      Past Surgical History:   Procedure Laterality Date    CERVICAL SPINE SURGERY  2007    CORONARY ANGIOPLASTY WITH STENT PLACEMENT      thyroid nodule  1997     Review of patient's allergies indicates:  No Known Allergies    Scheduled Medications:    amlodipine  10 mg Oral Daily    aspirin  81 mg Oral Daily    atorvastatin  40 mg Oral Daily    donepezil  10 mg Oral Daily    enoxaparin  40 mg Subcutaneous Daily    lisinopril  5 mg Oral Daily    memantine  10 mg Oral BID     metoprolol succinate  25 mg Oral Daily    sodium chloride 0.9%  3 mL Intravenous Q8H       PRN Medications: acetaminophen, dextrose 50%, dextrose 50%, glucagon (human recombinant), glucose, glucose, insulin aspart, labetalol, ondansetron, polyethylene glycol, sodium chloride 0.9%    Family History     None        Social History Main Topics    Smoking status: Never Smoker    Smokeless tobacco: Not on file    Alcohol use Not on file    Drug use: Unknown    Sexual activity: Not on file     Review of Systems   Constitutional: Positive for fatigue. Negative for chills and fever.   HENT: Negative for drooling, facial swelling, trouble swallowing and voice change.    Eyes: Negative for photophobia and visual disturbance.   Respiratory: Negative for cough, shortness of breath and wheezing.    Cardiovascular: Negative for chest pain and palpitations.   Gastrointestinal: Negative for abdominal distention, nausea and vomiting.   Genitourinary: Negative for difficulty urinating and flank pain.   Musculoskeletal: Negative for arthralgias, gait problem and myalgias.   Skin: Negative for color change and rash.   Neurological: Positive for facial asymmetry, speech difficulty and weakness. Negative for numbness.   Psychiatric/Behavioral: Positive for confusion. Negative for agitation.     Objective:     Vital Signs (Most Recent):  Temp: 97.1 °F (36.2 °C) (06/22/17 0803)  Pulse: 74 (06/22/17 0803)  Resp: 18 (06/22/17 0520)  BP: (!) 186/95 (06/22/17 0803)  SpO2: 98 % (06/22/17 0803)    Vital Signs (24h Range):  Temp:  [97.1 °F (36.2 °C)-98.9 °F (37.2 °C)] 97.1 °F (36.2 °C)  Pulse:  [59-74] 74  Resp:  [15-18] 18  SpO2:  [95 %-99 %] 98 %  BP: (142-186)/(72-95) 186/95     Body mass index is 27.76 kg/m².    Physical Exam   Constitutional: He appears well-developed and well-nourished.   HENT:   Head: Normocephalic and atraumatic.   Eyes: EOM are normal. Pupils are equal, round, and reactive to light.   Neck: Normal range of  motion.   Cardiovascular: Normal rate and regular rhythm.    Pulmonary/Chest: Effort normal. No respiratory distress.   Abdominal: Soft. There is no tenderness.   Musculoskeletal: Normal range of motion.   Neurological:   Neurologic:  -  Mental Status:  AAOx2. Oriented to person and  only. Follows commands.  Recalls 3/3 objects.    -  Speech and language:  + aphasia,  No dysarthria.    -mild facial droop  -  Coordination:  Finger to nose exam:  RUE normal, LUE dysmetria.   -  Motor:  RUE: 5/5, 5/5 .  LUE: 3/5, 3/5 .  RLE: 5/5, DF 5/5, PF 5/5.  LLE: 3/5, DF 3/5, PF 4/5.  -  Tone:  normal  -  Sensory:  Intact to light touch and pin prick.   Skin: Skin is warm and dry.   Psychiatric: Cognition and memory are impaired.       Diagnostic Results:   Labs: Reviewed  CT: Reviewed  MRI: Reviewed  CTA:reviewed    Assessment/Plan:     Facial droop    -SLP evaluate and treat        * Hemiparesis, left    -  Encourage mobility, OOB in chair at least 3 hours per day, and ambulation.  -  PT/OT evaluate and treat.  Functional status: pending OT/PT evaluation.   -  SLP speech and cognitive evaluate and treat.   Cognitive/Speech/Language status:  Pending.  Nutrition/Swallow Status:  pending bedside swallow evaluation.    -  Monitor sleep disturbances and establish consistent sleep-wake cycle.  -  Monitor for bowel and bladder dysfunction.    -  Monitor for shoulder pain and subluxation.  -  Monitor for spasticity.  -  Monitor for skin breakdown and pressure ulcers (early mobility, repositioning/weight shifting every 20-30 minutes when sitting, turn patient every 2 hours, proper mattress/overlay and chair cushioning, and pressure relief/heel protector boots)  -  DVT prophylaxis:  Lovenox.  -  Reviewed discharge options with patient (inpatient rehab, SNF, home health therapy, and outpatient therapy).  Encourage participation with therapy.          Will follow patient's progress and discuss with rehab team for rehab  recommendations.    Thank you for your consult.     Barb Umanzor NP  Department of Physical Medicine & Rehab  [facility]

## 2017-06-22 NOTE — PLAN OF CARE
[email or web address] [id number] [place] [address] - [address] AT [facility]  [address]  [facility] [address]  Phone: [phone] Fax: [phone]      This CM spoke with patient spouse by phone, stated she left facility for short period and will be returning.       06/22/17 1135   Discharge Assessment   Assessment Type Discharge Planning Assessment   Confirmed/corrected address and phone number on facesheet? Yes   Assessment information obtained from? Caregiver   Expected Length of Stay (days) 3   Communicated expected length of stay with patient/caregiver yes   Prior to hospitilization cognitive status: Alert/Oriented   Prior to hospitalization functional status: Assistive Equipment   Current cognitive status: Alert/Oriented   Current Functional Status: Assistive Equipment   Arrived From home or self-care   Lives With spouse   Able to Return to Prior Arrangements yes   Is patient able to care for self after discharge? Unable to determine at this time (comments)   How many people do you have in your home that can help with your care after discharge? 1   Who are your caregiver(s) and their phone number(s)? (patient spouse Samanta Christine [phone])   Patient's perception of discharge disposition admitted as an inpatient   Readmission Within The Last 30 Days no previous admission in last 30 days   Patient currently being followed by outpatient case management? No   Patient currently receives home health services? No   Does the patient currently use HME? No   Patient currently receives private duty nursing? No   Patient currently receives any other outside agency services? No   Equipment Currently Used at Home wheelchair;rollator;walker, rolling;cane, straight;cane, quad   Do you have any problems affording any of your prescribed medications? No   Is the patient taking medications as prescribed? yes   Do you have any financial concerns preventing you from receiving the healthcare you need?  No   Does the patient have transportation to healthcare appointments? Yes   Transportation Available family or friend will provide   On Dialysis? No   Does the patient receive services at the [facility]mad[facility] Clinic? No   Are there any open cases? No   Discharge Plan A Rehab   Discharge Plan B Home;Home Health   Patient/Family In Agreement With Plan yes       Yenni Pearson   [id number]

## 2017-06-22 NOTE — SUBJECTIVE & OBJECTIVE
Past Medical History:   Diagnosis Date    Alzheimer disease     CKD (chronic kidney disease) stage 3, GFR 30-59 ml/min     Hypertension     Seizure     Stroke     Type 2 diabetes mellitus with diabetic neuropathy      Past Surgical History:   Procedure Laterality Date    CERVICAL SPINE SURGERY  2007    CORONARY ANGIOPLASTY WITH STENT PLACEMENT      thyroid nodule  1997     Review of patient's allergies indicates:  No Known Allergies    Scheduled Medications:    amlodipine  10 mg Oral Daily    aspirin  81 mg Oral Daily    atorvastatin  40 mg Oral Daily    donepezil  10 mg Oral Daily    enoxaparin  40 mg Subcutaneous Daily    lisinopril  5 mg Oral Daily    memantine  10 mg Oral BID    metoprolol succinate  25 mg Oral Daily    sodium chloride 0.9%  3 mL Intravenous Q8H       PRN Medications: acetaminophen, dextrose 50%, dextrose 50%, glucagon (human recombinant), glucose, glucose, insulin aspart, labetalol, ondansetron, polyethylene glycol, sodium chloride 0.9%    Family History     None        Social History Main Topics    Smoking status: Never Smoker    Smokeless tobacco: Not on file    Alcohol use Not on file    Drug use: Unknown    Sexual activity: Not on file     Review of Systems   Constitutional: Positive for fatigue. Negative for chills and fever.   HENT: Negative for drooling, facial swelling, trouble swallowing and voice change.    Eyes: Negative for photophobia and visual disturbance.   Respiratory: Negative for cough, shortness of breath and wheezing.    Cardiovascular: Negative for chest pain and palpitations.   Gastrointestinal: Negative for abdominal distention, nausea and vomiting.   Genitourinary: Negative for difficulty urinating and flank pain.   Musculoskeletal: Negative for arthralgias, gait problem and myalgias.   Skin: Negative for color change and rash.   Neurological: Positive for facial asymmetry, speech difficulty and weakness. Negative for numbness.    Psychiatric/Behavioral: Positive for confusion. Negative for agitation.     Objective:     Vital Signs (Most Recent):  Temp: 97.1 °F (36.2 °C) (17 08)  Pulse: 74 (17 08)  Resp: 18 (17 0520)  BP: (!) 186/95 (17)  SpO2: 98 % (17)    Vital Signs (24h Range):  Temp:  [97.1 °F (36.2 °C)-98.9 °F (37.2 °C)] 97.1 °F (36.2 °C)  Pulse:  [59-74] 74  Resp:  [15-18] 18  SpO2:  [95 %-99 %] 98 %  BP: (142-186)/(72-95) 186/95     Body mass index is 27.76 kg/m².    Physical Exam   Constitutional: He appears well-developed and well-nourished.   HENT:   Head: Normocephalic and atraumatic.   Eyes: EOM are normal. Pupils are equal, round, and reactive to light.   Neck: Normal range of motion.   Cardiovascular: Normal rate and regular rhythm.    Pulmonary/Chest: Effort normal. No respiratory distress.   Abdominal: Soft. There is no tenderness.   Musculoskeletal: Normal range of motion.   Neurological:   Neurologic:  -  Mental Status:  AAOx2. Oriented to person and  only. Follows commands.  Recalls 3/3 objects.    -  Speech and language:  + aphasia,  No dysarthria.    -mild facial droop  -  Coordination:  Finger to nose exam:  RUE normal, LUE dysmetria.   -  Motor:  RUE: 5/5, 5/5 .  LUE: 4/5, 4/5 .  RLE: 5/5, DF 5/5, PF 5/5.  LLE: 4/5, DF 4/5, PF 4/5.  -  Tone:  normal  -  Sensory:  Intact to light touch and pin prick.   Skin: Skin is warm and dry.   Psychiatric: Cognition and memory are impaired.       Diagnostic Results:   Labs: Reviewed  CT: Reviewed  MRI: Reviewed  CTA:reviewed

## 2017-06-22 NOTE — CONSULTS
PM&R consult received.    Reason for consult:  Assess rehab needs    Reviewed patient history and current admission.  Full consult to follow.    ALTHEA Mathews, FNP-C  Physical Medicine & Rehabilitation   06/22/2017

## 2017-06-22 NOTE — PLAN OF CARE
Problem: Occupational Therapy Goal  Goal: Occupational Therapy Goal  Goals to be met by 7/6/2017:    1.  Sit eob with CG A for dynamic sitting balance during grooming tasks  2.  CG A to complete grooming tasks in supported sitting  3.  Complete UB dressing with min a  4.  Don shorts/underpants or pants with min a  5.  Toilet self with min a  6.  Toilet or BSC transfer with min a  Outcome: Ongoing (interventions implemented as appropriate)  Eval completed and goals established'  AIXA Mckeon  6/22/2017  [phone]

## 2017-06-22 NOTE — HOSPITAL COURSE
6/22/17: Evaluated by therapy. Bed mobility Luciano-ModA.  Sit to stand Luciano-ModA & RW.  Ambulated 10ft x 2 trials with ModA & RW. UBD ModA and LBD ModA.  6/22/17: SLP evaluated. Diet Regular and thin liquids. Cognitive-linguistic impairments noted.  6/23/17: PT/OT evaluations pending.  6/25/17: Participating with OT. Bed mobility ModA-MaxA.  Sit to stand ModA & RW.  No ambulation 2/2 lethargy.   LBD ModA-MaxA.

## 2017-06-22 NOTE — PROGRESS NOTES
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 Imaging positive for right thalamic infarct; lacunar infarct. Likely etiology of stroke is small vessel ischemic disease.  Antithrombotics for secondary stroke prevention: Antiplatelets:  Aspirin: 81 mg oral now and daily, Statins for secondary stroke prevention and hyperlipidemia, if present: Atorvastatin- 40 mg oral daily, Aggressive risk factor modification: Hypertension, Diabetes, High Cholesterol, Diet and Exercise, Rehab Efforts: Physical Therapy, Occupational Therapy and Speech and Language Pathology, Diagnostics: Ordered/Pending Other: none and VTE Prophylaxis: lovenox 40mg qd        Cytotoxic cerebral edema    2/2 stroke        Combined systolic and diastolic cardiac dysfunction    - ECHO with 35% EF; +diastolic dysfunction  - lisinopril 5mg qd        Cerebral microvascular disease    Likely etiology of stroke  - plan as above        Facial droop    2/2 stroke        Hemiparesis, left    2/2 stroke  PT/OT recommending rehab        Essential hypertension    Stroke risk factor  SBP <160  Lisinopril 5mg qd        Type 2 diabetes mellitus with diabetic polyneuropathy, with long-term current use of insulin    Stroke risk factor  - uncontrolled  HgBA1c 9.8  SSI        Chronic kidney disease, stage III (moderate)    Baseline Cr 1.3-1.9          Dispo: PT/OT recommending rehab    Neurologic Chief Complaint: left sided weakness    Subjective:     Interval History: Patient is seen for follow-up neurological assessment and treatment recommendations:   No acute events overnight.     HPI, Past Medical, Family, and Social History remains the same as documented in the initial encounter.     Review of Systems   Constitutional: Negative for fatigue and  fever.   Respiratory: Negative for shortness of breath.    Cardiovascular: Negative for chest pain.   Neurological: Positive for weakness.     Scheduled Meds:   amlodipine  10 mg Oral Daily    aspirin  81 mg Oral Daily    atorvastatin  40 mg Oral Daily    donepezil  10 mg Oral Daily    enoxaparin  40 mg Subcutaneous Daily    lisinopril  5 mg Oral Daily    memantine  10 mg Oral BID    metoprolol succinate  25 mg Oral Daily    sodium chloride 0.9%  3 mL Intravenous Q8H     Continuous Infusions:   sodium chloride 0.9%       PRN Meds:acetaminophen, dextrose 50%, dextrose 50%, glucagon (human recombinant), glucose, glucose, insulin aspart, labetalol, ondansetron, polyethylene glycol, sodium chloride 0.9%    Objective:     Vital Signs (Most Recent):  Temp: 97.4 °F (36.3 °C) (06/22/17 1236)  Pulse: (!) 51 (06/22/17 1500)  Resp: 17 (06/22/17 1236)  BP: (!) 193/92 (06/22/17 1236)  SpO2: 99 % (06/22/17 1236)  BP Location: Left arm    Vital Signs Range (Last 24H):  Temp:  [97.1 °F (36.2 °C)-98.9 °F (37.2 °C)]   Pulse:  [51-74]   Resp:  [15-18]   BP: (142-193)/(72-95)   SpO2:  [95 %-99 %]   BP Location: Left arm    Physical Exam   Constitutional: He appears well-developed and well-nourished.   HENT:   Head: Normocephalic and atraumatic.   Eyes: Pupils are equal, round, and reactive to light.   Cardiovascular: Normal rate.        Neurological Exam:   LOC: alert and follows requests  Language: No aphasia  Speech: Dysarthria  Orientation: Person, Not oriented to time  Visual Fields (CN II): Full  EOM (CN III, IV, VI): Full/intact  Pupils (CN III, IV, VI): PERRL  Facial Sensation (CN V): Symmetric  Facial Movement (CN VII): lower weakness left lower  Shoulder/Neck (CN XI): SCM-Left: Normal ; SCM-Right: Normal ; Shoulder Shrug: Normal/Symetric  Tongue (CN XII): to midline  Motor*: Arm Left:  Paretic:  4/5, Leg Left:   Paretic:  3/5, Arm Right:   Normal (5/5), Leg Right:   Normal (5/5)  Cerebellar*: Normal limb  Sensation:  intact to light touch, temperature and vibration  Tone: Arm-Left: normal; Leg-Left: normal; Arm-Right: normal; Leg-Right: normal    NIH Stroke Scale:    Level of Consciousness: 0 - alert  LOC Questions: 1 - answers one correctly (at cognitive baseline from dementia)  LOC Commands: 0 - performs both correctly  Best Gaze: 0 - normal  Visual: 0 - no visual loss  Facial Palsy: 2 - partial  Motor Left Arm: 1 - drift  Motor Right Arm: 0 - no drift  Motor Left Le - can't resist gravity  Motor Right Le - no drift  Limb Ataxia: 0 - absent  Sensory: 0 - normal  Best Language: 0 - no aphasia  Dysarthria: 1 - mild to moderate dysarthria  Extinction and Inattention: 0 - no neglect  NIH Stroke Scale Total: 7      Laboratory:  CMP:   Recent Labs  Lab 17   CALCIUM 9.4   ALBUMIN 3.4*   PROT 7.3      K 4.8   CO2 24      BUN 17   CREATININE 1.6*   ALKPHOS 184*   ALT 18   AST 25   BILITOT 0.7     BMP:   Recent Labs  Lab 17      K 4.8      CO2 24   BUN 17   CREATININE 1.6*   CALCIUM 9.4     CBC:   Recent Labs  Lab 17  0017 17  0023   WBC 7.28  --    RBC 5.08  --    HGB 14.1  --    HCT 41.2 43     --    MCV 81*  --    MCH 27.8  --    MCHC 34.2  --      Lipid Panel:   Recent Labs  Lab 17   CHOL 133   LDLCALC 70.6   HDL 42   TRIG 102     Coagulation:   Recent Labs  Lab 17  0440   INR 1.0   APTT 21.8     Platelet Aggregation Study: No results for input(s): PLTAGG, PLTAGINTERP, PLTAGREGLACO, ADPPLTAGGREG in the last 168 hours.  Hgb A1C:   Recent Labs  Lab 17  001   HGBA1C 9.8*     TSH:   Recent Labs  Lab 17  001   TSH 1.710       Diagnostic Results:  I have personally reviewed:   Findings:     MRI Brain WO contrast (17)  Right thalamic infarct. +cytotoxic cerebral edema.           Aníbal Martinez MD  [facility]  Department of Vascular Neurology   [facility]

## 2017-06-22 NOTE — ASSESSMENT & PLAN NOTE
84 yr old male with PMHx of HTN, DMII, HLD, dementia, CKD presenting with left hemiparesis. Imaging positive for right thalamic infarct; lacunar infarct. Likely etiology of stroke is small vessel ischemic disease.  Antithrombotics for secondary stroke prevention: Antiplatelets:  Aspirin: 81 mg oral now and daily, Statins for secondary stroke prevention and hyperlipidemia, if present: Atorvastatin- 40 mg oral daily, Aggressive risk factor modification: Hypertension, Diabetes, High Cholesterol, Diet and Exercise, Rehab Efforts: Physical Therapy, Occupational Therapy and Speech and Language Pathology, Diagnostics: Ordered/Pending Other: none and VTE Prophylaxis: lovenox 40mg qd

## 2017-06-22 NOTE — ASSESSMENT & PLAN NOTE
84 yr old male with PMHx of HTN, DMII, HLD, dementia, CKD presenting with left hemiparesis. Imaging positive for right thalamic infarct; lacunar infarct. Likely etiology of stroke is small vessel ischemic disease.  Antithrombotics for secondary stroke prevention: Antiplatelets:  Clopidogrel 75mg qd, Statins for secondary stroke prevention and hyperlipidemia, if present: Atorvastatin- 40 mg oral daily, Aggressive risk factor modification: Hypertension, Diabetes, High Cholesterol, Diet and Exercise, Rehab Efforts: Physical Therapy, Occupational Therapy and Speech and Language Pathology, Diagnostics: Ordered/Pending Other: none and VTE Prophylaxis: lovenox 40mg qd

## 2017-06-22 NOTE — ED PROVIDER NOTES
Encounter Date: 6/21/2017    SCRIBE #1 NOTE: I, Janina Mckeon, am scribing for, and in the presence of,  Dr. Mena. I have scribed the following portions of the note - the Resident attestation and the EKG reading. Other sections scribed: critical care, imaging.       History     Chief Complaint   Patient presents with    Cerebrovascular Accident     Pts family reports left sided weakness and facial droop that began around 2230     Patient is an 85yo M with history of T2DM, Alzheimer, CKD and HTN who presents with L sided facial droop for 1.5hrs. Patient and his spouse note he was in his usual state of health until about 10:30pm, when his wife noted he had a left sided facial droop. He seemed a little more confused than normal, and she became concerned for a stroke.           Review of patient's allergies indicates:  No Known Allergies  Past Medical History:   Diagnosis Date    Alzheimer disease     CKD (chronic kidney disease) stage 3, GFR 30-59 ml/min     Hypertension     Seizure     Type 2 diabetes mellitus with diabetic neuropathy      Past Surgical History:   Procedure Laterality Date    CERVICAL SPINE SURGERY  2007    CORONARY ANGIOPLASTY WITH STENT PLACEMENT      thyroid nodule  1997     No family history on file.  Social History   Substance Use Topics    Smoking status: Never Smoker    Smokeless tobacco: Not on file    Alcohol use Not on file     Review of Systems   Constitutional: Negative for chills and fever.   HENT: Negative for congestion, rhinorrhea and sore throat.    Eyes: Negative for visual disturbance.   Respiratory: Negative for cough and shortness of breath.    Cardiovascular: Negative for chest pain.   Gastrointestinal: Negative for abdominal pain, constipation, diarrhea, nausea and vomiting.   Genitourinary: Negative for dysuria and hematuria.   Musculoskeletal: Negative for back pain.   Skin: Negative for rash.   Neurological: Positive for facial asymmetry and weakness (L arm and  L leg). Negative for dizziness, syncope and headaches.       Physical Exam     Initial Vitals [06/22/17 0003]   BP Pulse Resp Temp SpO2   (!) 143/75 62 18 98.9 °F (37.2 °C) 98 %     Physical Exam    Nursing note and vitals reviewed.  Constitutional: He appears well-developed and well-nourished. He is not diaphoretic. No distress.   HENT:   Head: Normocephalic and atraumatic.   Mouth/Throat: No oropharyngeal exudate.   Eyes: Pupils are equal, round, and reactive to light. Right eye exhibits no discharge. Left eye exhibits no discharge. No scleral icterus.   Neck: No tracheal deviation present. No JVD present.   Cardiovascular: Normal rate, regular rhythm, normal heart sounds and intact distal pulses. Exam reveals no friction rub.    No murmur heard.  Pulmonary/Chest: Breath sounds normal. No respiratory distress. He has no wheezes. He has no rhonchi. He has no rales.   Abdominal: Soft. Bowel sounds are normal. He exhibits no distension. There is no tenderness. There is no guarding.   Musculoskeletal: He exhibits no edema or tenderness.   Lymphadenopathy:     He has no cervical adenopathy.   Neurological: He is alert. No sensory deficit.   Moves all extremities and carries on conversation.   Patient with L facial droop on smile. Normal pupillary reflex to light, normal extra-occular motion, normal facial sensation, tongue protrusion midline, uvula elevation symmetric.   L arm flexion/extension 4/5 compared to 5/5 on the R. L leg hip flexion 4/5 compared to 5/5 on the R. Plantar/dorsiflexion 5/5 bilaterally. Sensation to light touch intact and symmetric bilaterally.    Skin: Skin is warm and dry. Capillary refill takes less than 2 seconds.   Psychiatric: He has a normal mood and affect.         ED Course   Procedures  Labs Reviewed   CBC W/ AUTO DIFFERENTIAL - Abnormal; Notable for the following:        Result Value    MCV 81 (*)     All other components within normal limits   COMPREHENSIVE METABOLIC PANEL - Abnormal;  Notable for the following:     Glucose 219 (*)     Creatinine 1.6 (*)     Albumin 3.4 (*)     Alkaline Phosphatase 184 (*)     Anion Gap 6 (*)     eGFR if  45.1 (*)     eGFR if non  39.0 (*)     All other components within normal limits   POCT GLUCOSE - Abnormal; Notable for the following:     POCT Glucose 245 (*)     All other components within normal limits   ISTAT PROCEDURE - Abnormal; Notable for the following:     POC Glucose 219 (*)     POC Creatinine 1.5 (*)     All other components within normal limits   CULTURE, URINE   PROTIME-INR   TSH   LIPID PANEL   HEMOGLOBIN A1C   URINALYSIS   TROPONIN I   CK-MB   APTT   PROTIME-INR   POCT GLUCOSE   ISTAT CREATININE   ISTAT PROCEDURE   ISTAT CHEM8   POCT GLUCOSE MONITORING CONTINUOUS     EKG Readings: (Independently Interpreted)   Normal sinus rhythm. 60 bpm, no ischemic changes.        X-Rays:   Independently Interpreted Readings:   Chest X-Ray: Cardiomegaly and left pleural effusion.    Head CT: No stroke, no acute bleed.     Medical Decision Making:   History:   Old Medical Records: I decided to obtain old medical records.  Independently Interpreted Test(s):   I have ordered and independently interpreted X-rays - see prior notes.  I have ordered and independently interpreted EKG Reading(s) - see prior notes  Clinical Tests:   Lab Tests: Ordered and Reviewed  Radiological Study: Ordered and Reviewed  Medical Tests: Ordered and Reviewed  Other:   I have discussed this case with another health care provider.       APC / Resident Notes:   PGY-2 MDM:   -Patient is a 84 y.o. presenting with a chief complaint of L facial droop and confusion for 1.5 hrs. Vital signs stable, patient afebrile, non-tachycardic and non-hypoxic.   -Patient taken to CT scan for concern for stroke, and was seen shortly after his stroke. POC glucose pending. Patient with L facial droop and L sided weakness on exam. Though this does not fit a typical stroke pattern,  stroke team was activated.   -Patient will require blood pressure re-check in 1 week with primary care physician.     Workup ongoing, will continue to re-assess patient and update management as needed.     Serafin Lopez DO  [employer] EM/IM PGY-2  6/22/2017 12:17 AM         Patient Care Update:  -EKG: Normal Sinus Rhythm, rate 60. Left Axis (-29). No interval or enlargement criteria noted. No acute ST segment changes.   -Vascular neurology to admit for MRI and stroke workup.     Serafin Lopez DO  [employer] EM/IM PGY-2  6/22/2017 12:45 AM              Scribe Attestation:   Scribe #1: I performed the above scribed service and the documentation accurately describes the services I performed. I attest to the accuracy of the note.    Attending Attestation:   Physician Attestation Statement for Resident:  As the supervising MD   Physician Attestation Statement: I have personally seen and examined this patient.   I agree with the above history. -: 84 y.o. male with a history of stroke without residual deficits and seizure after an episode of hypoglycemia presents with left arm weakness and left sided facial droop his wife noticed this about 90 min prior to arrival (around 10:45 pm). His mental status is baseline and on exam he has left sided facial droop, left arm weakness, and mild left leg weakness.     12:11 PM  Stroke team notified of patient's arrival   As the supervising MD I agree with the above PE.    As the supervising MD I agree with the above treatment, course, plan, and disposition.        Attending Critical Care:   Critical Care Times:   Direct Patient Care (initial evaluation, reassessments, and time considering the case)................................................................20 minutes.   Additional History from reviewing old medical records or taking additional history from the family, EMS, PCP, etc.......................4 minutes.   Ordering, Reviewing, and Interpreting Diagnostic  Studies...............................................................................................................4 minutes.   Documentation..................................................................................................................................................................................4 minutes.   Consultation with other Physicians. .................................................................................................................................................4 minutes.   ==============================================================  · Total Critical Care Time - exclusive of procedural time: 36 minutes.  ==============================================================    Physician Attestation for Scribe:  Physician Attestation Statement for Scribe #1: I, Dr. Mena, reviewed documentation, as scribed by Janina Mckeon in my presence, and it is both accurate and complete.                 ED Course     Clinical Impression:   The encounter diagnosis was Acute left hemiparesis.    Disposition:   Disposition: Admitted                        Serafin Lopez MD  Resident  06/22/17 0351

## 2017-06-22 NOTE — PT/OT/SLP EVAL
Physical Therapy  Evaluation    Emmett Perez   MRN: [id number]   Admitting Diagnosis: Hemiparesis, left    PT Received On: 06/22/17  PT Start Time: 1026     PT Stop Time: 1046    PT Total Time (min): 20 min       Billable Minutes:  Evaluation 10 and Therapeutic Activity 10   Moderate complexity eval  Alone at home while wife works  1-2 steps to enter home  Alzheimer's  Moderate assist for bed mobility, transfers, and gait  L sided weakness  Impaired safety awareness    Diagnosis: Hemiparesis, left  L sided weakness and facial droop; MRI 6/22/17 shows acute R thalamic infarct    Past Medical History:   Diagnosis Date    Alzheimer disease     CKD (chronic kidney disease) stage 3, GFR 30-59 ml/min     Hypertension     Seizure     Stroke     Type 2 diabetes mellitus with diabetic neuropathy       Past Surgical History:   Procedure Laterality Date    CERVICAL SPINE SURGERY  2007    CORONARY ANGIOPLASTY WITH STENT PLACEMENT      thyroid nodule  1997   Referring physician: Herb  Date referred to PT: 6/22/17  General Precautions: Standard, fall, aspiration  Orthopedic Precautions: N/A   Braces: N/A       Do you have any cultural, spiritual, Rastafarian conflicts, given your current situation?: none noted    Patient History:  Lives With: spouse  Living Arrangements: house  Home Accessibility: stairs to enter home  Home Layout: Able to live on 1st floor  Number of Stairs to Enter Home: 1 (pt states he has 1-2 steps to enter)  Stair Railings at Home: none  Living Environment Comment: Pt states he is alone at times while his wife works, will need to confirm information with family  Equipment Currently Used at Home: cane, straight, cane, quad, rollator, bath bench    Previous Level of Function:  Ambulation Skills: independent (pt states he was independent with gait; pt has Alzheimer's and no family present so will need to confirm information)  Transfer Skills: independent  ADL Skills:  "independent    Subjective:  Communicated with nurse prior to session.  "I need to use the bathroom"  Pain/Comfort  Pain Rating 1: 0/10  Pain Rating Post-Intervention 1: 0/10  Objective:   Patient found with: bed alarm, oxygen     Cognitive Exam:  Oriented to: Person    Follows Commands/attention: Follows one-step commands  Communication: clear/fluent  Safety awareness/insight to disability: impaired    Physical Exam:  Postural examination/scapula alignment: Rounded shoulder and Head forward    Sensation:   Intact  light/touch B LE    Lower Extremity Range of Motion:  Right Lower Extremity: WFL  Left Lower Extremity: WFL    Lower Extremity Strength:  Right Lower Extremity: WFL  Left Lower Extremity: Deficits: hip flex 3-/5; knee ext 3-/5; knee flex 4-/5; ankle DF 3-/5     Fine motor coordination:  Impaired  LLE heel shin     Gross motor coordination: impaired L LE    Functional Mobility:  Bed Mobility:  Supine to Sit: Moderate Assistance  Sit to Supine: Minimum Assistance, With leg lift (requires assist to lift his L LE onto the bed)    Transfers:  Sit <> Stand Assistance: Minimum Assistance, Moderate Assistance (1 from bed with minimal assist; 1 from low toilet with moderate assist)  Sit <> Stand Assistive Device: Rolling Walker    Gait:   Gait Distance: 10ft x 2 trials to/from bathroom with RW with decreased clearance of L foot and difficulty with L foot placement requiring manual cues to correct and to assist with advancing  Assistance 1: Moderate assistance  Gait Assistive Device: Rolling walker  Gait Deviation(s): decreased jose, decreased step length, decreased toe-to-floor clearance, forward lean, lateral lean    Balance:   Static Sit: POOR: Needs MODERATE assist to maintain  Dynamic Sit: FAIR: Cannot move trunk without losing balance  Static Stand: FAIR: Maintains without assist but unable to take challenges    Therapeutic Activities and Exercises:  Pt urinated on the commode as above, nurse " notified    AM-PAC 6 CLICK MOBILITY  How much help from another person does this patient currently need?   1 = Unable, Total/Dependent Assistance  2 = A lot, Maximum/Moderate Assistance  3 = A little, Minimum/Contact Guard/Supervision  4 = None, Modified [place]/Independent    Turning over in bed (including adjusting bedclothes, sheets and blankets)?: 3  Sitting down on and standing up from a chair with arms (e.g., wheelchair, bedside commode, etc.): 2  Moving from lying on back to sitting on the side of the bed?: 2  Moving to and from a bed to a chair (including a wheelchair)?: 2  Need to walk in hospital room?: 2  Climbing 3-5 steps with a railing?: 1  Total Score: 12     AM-PAC Raw Score CMS G-Code Modifier Level of Impairment Assistance   6 % Total / Unable   7 - 9 CM 80 - 100% Maximal Assist   10 - 14 CL 60 - 80% Moderate Assist   15 - 19 CK 40 - 60% Moderate Assist   20 - 22 CJ 20 - 40% Minimal Assist   23 CI 1-20% SBA / CGA   24 CH 0% Independent/ Mod I     Patient left supine with all lines intact, call button in reach, bed alarm on and nurse notified.    Assessment:   Emmett Perez is a 84 y.o. male with a medical diagnosis of Hemiparesis, left and presents with difficulty with functional mobility due to weakness on L>R, instability, impaired safety awareness and fatigue. Pt is motivated to progress with mobility. Pt can benefit from continued therapy to work towards improved  mobility.    Rehab identified problem list/impairments: Rehab identified problem list/impairments: weakness, impaired functional mobilty, impaired endurance, impaired self care skills, gait instability, impaired balance, impaired cognition, decreased lower extremity function, decreased upper extremity function    Rehab potential is good.    Activity tolerance: Good    Discharge recommendations: Discharge Facility/Level Of Care Needs: rehabilitation facility     Barriers to discharge: Barriers to Discharge: Inaccessible  home environment, Decreased caregiver support (1-2 steps to enter and pt is alone at times)    Equipment recommendations: Equipment Needed After Discharge:  (TBD as pt progresses)     GOALS:    Physical Therapy Goals        Problem: Physical Therapy Goal    Goal Priority Disciplines Outcome Goal Variances Interventions   Physical Therapy Goal     PT/OT, PT Ongoing (interventions implemented as appropriate)     Description:  PT goals until 6/30/17    1. Pt supine to sit with SBA-not met  2. Pt sit to supine with SBA-not met  3. Pt sit to stand with RW with CGA-not met  4. Pt to perform gait 100ft with RW with CGA.-not met  5. Pt to transfer bed to/from bedside chair with CGA.-not met  6. Pt to up/down 2 steps with no rail with minimal assist.-not met  7. Pt to perform B LE exs in sitting or supine x 15 reps to increase LE strength to improve functional mobility.-not met                  PLAN:    Patient to be seen 6 x/week to address the above listed problems via gait training, therapeutic activities, therapeutic exercises, neuromuscular re-education  Plan of Care expires: 07/22/17  Plan of Care reviewed with: patient    Princess MASON Aakash, PT  06/22/2017

## 2017-06-22 NOTE — PLAN OF CARE
Du spoke with pt and his wife Sirisha. Du explained current recommendation after d/c is a rehab facility. Sw provided a list of facilities. Sirisha stated she prefers [facility]. Sw informed [facility] of pt's preference and they will follow.     Kiran Gonzlaez, [facility]   [id number]

## 2017-06-22 NOTE — ASSESSMENT & PLAN NOTE
Antithrombotic: aspirin 81 mg daily  Statin: Lipitor 40 mg daily  Diagnostics: CTA head and neck, MRI Brain, 2dEcho, HgbA1c, lipid, ua and culture  Therapy: PT/OT/ST  VTE: SCD's and lovenox 40 mg daily SC

## 2017-06-22 NOTE — CONSULTS
"Attempted to review "Stroke MNT" handout with pt, however pt with other services and no family present. Noted pt is 84 years old, pt not appropriate for education at this time. Please re consult if education warranted. A1c 11.4. .     Iraida Ulloa, RD, LDN   "

## 2017-06-22 NOTE — HPI
Emmett Perez is a 84-year-old male with PMHx of Alzheimer disease, CKD, HTN, Seizure, and DMII.  Patient presented to [facility] on 6/22/17 with fatigue, generalized weakness but weaker on L side, and a facial droop.  CTH revealed no acute pathology. TPA was not administered due to outside treatment window. CTA was unremarkable. MRI showed signs of a R thalamic infarct.  Likely etiology of stroke is small vessel ischemic disease.    Functional History: Patient lives in [place] with his wife in a single story home with no steps to enter.  Prior to admission, independent with mobility and transfer with use of straight cane.  Independent with most ADLs, but does require assistance with bathing from wife.  DME: bath bench, cane (both straight and quad), rollator.

## 2017-06-22 NOTE — PLAN OF CARE
Problem: Physical Therapy Goal  Goal: Physical Therapy Goal  PT goals until 6/30/17    1. Pt supine to sit with SBA-not met  2. Pt sit to supine with SBA-not met  3. Pt sit to stand with RW with CGA-not met  4. Pt to perform gait 100ft with RW with CGA.-not met  5. Pt to transfer bed to/from bedside chair with CGA.-not met  6. Pt to up/down 2 steps with no rail with minimal assist.-not met  7. Pt to perform B LE exs in sitting or supine x 15 reps to increase LE strength to improve functional mobility.-not met    Outcome: Ongoing (interventions implemented as appropriate)  Pt's goals set and pt will benefit from skilled PT services to work towards improved functional mobility including: bed mobility, transfers, up/down steps, and gait.   Princess Finn, PT  6/22/2017

## 2017-06-22 NOTE — SUBJECTIVE & OBJECTIVE
Past Medical History:   Diagnosis Date    Alzheimer disease     CKD (chronic kidney disease) stage 3, GFR 30-59 ml/min     Hypertension     Seizure     Type 2 diabetes mellitus with diabetic neuropathy      Past Surgical History:   Procedure Laterality Date    CERVICAL SPINE SURGERY  2007    CORONARY ANGIOPLASTY WITH STENT PLACEMENT      thyroid nodule  1997     No family history on file.  Social History   Substance Use Topics    Smoking status: Never Smoker    Smokeless tobacco: Not on file    Alcohol use Not on file     Review of patient's allergies indicates:  No Known Allergies  Medications: I have reviewed the current medication administration record.      (Not in a hospital admission)    Review of Systems   Constitutional: Positive for fatigue. Negative for chills and fever.   HENT: Negative for ear discharge and ear pain.    Eyes: Negative for pain and itching.   Respiratory: Negative for cough and shortness of breath.    Cardiovascular: Negative for chest pain and leg swelling.   Gastrointestinal: Negative for abdominal distention and abdominal pain.   Genitourinary: Negative for difficulty urinating and dysuria.   Musculoskeletal: Positive for arthralgias and gait problem.   Skin: Negative for rash and wound.   Neurological: Positive for facial asymmetry and weakness.   Psychiatric/Behavioral: Positive for confusion. Negative for agitation.     Objective:     Vital Signs (Most Recent):  Temp: 98.9 °F (37.2 °C) (06/22/17 0003)  Pulse: 61 (06/22/17 0027)  Resp: 18 (06/22/17 0027)  BP: (!) 175/85 (06/22/17 0017)  SpO2: 95 % (06/22/17 0027)    Vital Signs Range (Last 24H):  Temp:  [98.9 °F (37.2 °C)]   Pulse:  [61-67]   Resp:  [18]   BP: (143-175)/(75-85)   SpO2:  [95 %-98 %]     Physical Exam   Constitutional: He appears well-developed and well-nourished.   HENT:   Head: Normocephalic and atraumatic.   Eyes: Pupils are equal, round, and reactive to light.   Neck: Normal range of motion.    Cardiovascular: Normal rate.    Pulmonary/Chest: Effort normal.   Abdominal: Soft.   Musculoskeletal: Normal range of motion.   Neurological: He is alert. GCS eye subscore is 4 - spontaneous. GCS verbal subscore is 4 - confused. GCS motor subscore is 6 - obeys commands.   Oriented x 2   Skin: Skin is warm and dry.   Nursing note and vitals reviewed.      Neurological Exam:   LOC: alert and follows requests  Language: No aphasia  Speech: No dysarthria  Orientation: Person, Place, Not oriented to time  Memory: Recent memory intact, Remote memory intact, Abnormalities: No age correct and No month correct  Visual Fields (CN II): Full  EOM (CN III, IV, VI): Full/intact  Oculocephalics: normal  Pupils (CN III, IV, VI): PERRL  Facial Sensation (CN V): Symmetric, Corneal reflex intact  Facial Movement (CN VII): lower weakness left lower  Hearing (CN VIII): intact bilaterally  Gag Reflex (CN IX, X): normal/symmetric  Shoulder/Neck (CN XI): SCM-Left: Normal ; SCM-Right: Normal ; Shoulder Shrug: Normal/Symetric  Tongue (CN XII): to midline  Reflexes: flexor plantar responses bilaterally  Motor*: Arm Left:  Paretic:  4/5, Leg Left:   Paretic:  4/5, Arm Right:   Normal (5/5), Leg Right:   Normal (5/5)  Cerebellar*: Not testable due to laying on stretcher  Sensation: intact to light touch, temperature and vibration  Tone: Arm-Left: normal; Leg-Left: normal; Arm-Right: normal; Leg-Right: normal    NIH Stroke Scale:  Interval: baseline (upon arrival/admit)  Level of Consciousness: 0 - alert  LOC Questions: 1 - answers one correctly  LOC Commands: 0 - performs both correctly  Best Gaze: 0 - normal  Visual: 0 - no visual loss  Facial Palsy: 1 - minor  Motor Left Arm: 1 - drift  Motor Right Arm: 0 - no drift  Motor Left Le - can't resist gravity  Motor Right Le - can't resist gravity  Limb Ataxia: 0 - absent  Sensory: 0 - normal  Best Language: 0 - no aphasia  Dysarthria: 0 - normal articulation  Extinction and Inattention:  0 - no neglect  NIH Stroke Scale Total: 7  Jennifer Coma Scale:  Best Eye Response: 4 - spontaneous  Best Motor Response: 6 - obeys commands  Best Verbal Response: 4 - confused  [place] Coma Scale Total: 14  Modified Ayad Scale:   Timeline: Prior to symptoms onset  Modified Ayad Score: 3 - moderate disability        Laboratory:  CMP:   Recent Labs  Lab 06/22/17 0017   CALCIUM 9.4   ALBUMIN 3.4*   PROT 7.3      K 4.8   CO2 24      BUN 17   CREATININE 1.6*   ALKPHOS 184*   ALT 18   AST 25   BILITOT 0.7     CBC:   Recent Labs  Lab 06/22/17 0017 06/22/17  0023   WBC 7.28  --    RBC 5.08  --    HGB 14.1  --    HCT 41.2 43     --    MCV 81*  --    MCH 27.8  --    MCHC 34.2  --      Lipid Panel:   Recent Labs  Lab 06/22/17 0017   CHOL 133   LDLCALC 70.6   HDL 42   TRIG 102     Coagulation:   Recent Labs  Lab 06/22/17 0017   INR 1.0     Platelet Aggregation Study: No results for input(s): PLTAGG, PLTAGINTERP, PLTAGREGLACO, ADPPLTAGGREG in the last 168 hours.  Hgb A1C: No results for input(s): HGBA1C in the last 168 hours.  TSH:   Recent Labs  Lab 06/22/17 0017   TSH 1.710       Diagnostic Results:  Brain Imaging:   CT Head w/o contrast 6-22-17 results:  No acute hemorrhage or major vascular territory infarct.  Consider MRI of the brain for further evaluation.    Chronic microvascular ischemic changes and age-appropriate generalized cerebral volume loss, similar to MRI brain 11/2016.    Cardiac Evaluation:  EKG/Telemetry: 6-22-17 unconfirmed results:  Normal sinus rhythm  Inferior infarct ,age undetermined  Anteroseptal infarct ,age undetermined

## 2017-06-22 NOTE — PLAN OF CARE
Problem: Patient Care Overview  Goal: Plan of Care Review  Outcome: Ongoing (interventions implemented as appropriate)  Arrived at 0520; transferred safely via stretcher.  POC reviewed with pt and spouse;  Spouse able to verbalize acceptance and understanding.  Pt's VS stable.  AAOx1 - alert to self only; pt very lethargic from meds given to him during MRI.  Questions answered/encouraged; reassurance provided.  Call light in reach, side rails up x2, nonskid socks on, bed alarm set, bed in lowest position with wheels locked.  Remains free from falls, skin breakdown, and injury.  Denies pain.  SCDs applied.  Telemetry box ordered.  Pt's glasses and hearing aids not available.  NIH done and documented; JULITA performed - pt passed; diabetic diet ordered per order set.  Pt on camera in room and aware.  Wife at bedside.  Will continue to monitor.

## 2017-06-23 LAB
ALBUMIN SERPL BCP-MCNC: 3.4 G/DL
ALP SERPL-CCNC: 195 U/L
ALT SERPL W/O P-5'-P-CCNC: 19 U/L
ANION GAP SERPL CALC-SCNC: 9 MMOL/L
AST SERPL-CCNC: 23 U/L
BACTERIA UR CULT: NO GROWTH
BASOPHILS # BLD AUTO: 0.01 K/UL
BASOPHILS NFR BLD: 0.1 %
BILIRUB SERPL-MCNC: 1 MG/DL
BUN SERPL-MCNC: 16 MG/DL
CALCIUM SERPL-MCNC: 9.6 MG/DL
CHLORIDE SERPL-SCNC: 106 MMOL/L
CO2 SERPL-SCNC: 25 MMOL/L
CREAT SERPL-MCNC: 1.3 MG/DL
DIFFERENTIAL METHOD: ABNORMAL
EOSINOPHIL # BLD AUTO: 0.1 K/UL
EOSINOPHIL NFR BLD: 1.8 %
ERYTHROCYTE [DISTWIDTH] IN BLOOD BY AUTOMATED COUNT: 12.5 %
EST. GFR  (AFRICAN AMERICAN): 57.9 ML/MIN/1.73 M^2
EST. GFR  (NON AFRICAN AMERICAN): 50.1 ML/MIN/1.73 M^2
GLUCOSE SERPL-MCNC: 205 MG/DL
HCT VFR BLD AUTO: 43.9 %
HGB BLD-MCNC: 15.3 G/DL
LYMPHOCYTES # BLD AUTO: 1.9 K/UL
LYMPHOCYTES NFR BLD: 26.1 %
MAGNESIUM SERPL-MCNC: 1.4 MG/DL
MCH RBC QN AUTO: 27.9 PG
MCHC RBC AUTO-ENTMCNC: 34.9 %
MCV RBC AUTO: 80 FL
MONOCYTES # BLD AUTO: 0.3 K/UL
MONOCYTES NFR BLD: 4.5 %
NEUTROPHILS # BLD AUTO: 4.8 K/UL
NEUTROPHILS NFR BLD: 67.2 %
PHOSPHATE SERPL-MCNC: 3.3 MG/DL
PLATELET # BLD AUTO: 229 K/UL
PMV BLD AUTO: 10.7 FL
POCT GLUCOSE: 154 MG/DL (ref 70–110)
POCT GLUCOSE: 169 MG/DL (ref 70–110)
POCT GLUCOSE: 186 MG/DL (ref 70–110)
POTASSIUM SERPL-SCNC: 3.9 MMOL/L
PROT SERPL-MCNC: 7.2 G/DL
RBC # BLD AUTO: 5.48 M/UL
SODIUM SERPL-SCNC: 140 MMOL/L
WBC # BLD AUTO: 7.17 K/UL

## 2017-06-23 PROCEDURE — 92526 ORAL FUNCTION THERAPY: CPT

## 2017-06-23 PROCEDURE — 85025 COMPLETE CBC W/AUTO DIFF WBC: CPT

## 2017-06-23 PROCEDURE — 20600001 HC STEP DOWN PRIVATE ROOM

## 2017-06-23 PROCEDURE — 97116 GAIT TRAINING THERAPY: CPT

## 2017-06-23 PROCEDURE — 99232 SBSQ HOSP IP/OBS MODERATE 35: CPT | Mod: ,,, | Performed by: NURSE PRACTITIONER

## 2017-06-23 PROCEDURE — 92507 TX SP LANG VOICE COMM INDIV: CPT

## 2017-06-23 PROCEDURE — 84100 ASSAY OF PHOSPHORUS: CPT

## 2017-06-23 PROCEDURE — 25000003 PHARM REV CODE 250: Performed by: PSYCHIATRY & NEUROLOGY

## 2017-06-23 PROCEDURE — 63600175 PHARM REV CODE 636 W HCPCS: Performed by: NURSE PRACTITIONER

## 2017-06-23 PROCEDURE — 99233 SBSQ HOSP IP/OBS HIGH 50: CPT | Mod: GC,,, | Performed by: PSYCHIATRY & NEUROLOGY

## 2017-06-23 PROCEDURE — 97535 SELF CARE MNGMENT TRAINING: CPT

## 2017-06-23 PROCEDURE — 36415 COLL VENOUS BLD VENIPUNCTURE: CPT

## 2017-06-23 PROCEDURE — 97110 THERAPEUTIC EXERCISES: CPT

## 2017-06-23 PROCEDURE — 80053 COMPREHEN METABOLIC PANEL: CPT

## 2017-06-23 PROCEDURE — 97530 THERAPEUTIC ACTIVITIES: CPT

## 2017-06-23 PROCEDURE — 83735 ASSAY OF MAGNESIUM: CPT

## 2017-06-23 PROCEDURE — 63600175 PHARM REV CODE 636 W HCPCS: Performed by: PSYCHIATRY & NEUROLOGY

## 2017-06-23 PROCEDURE — 25000003 PHARM REV CODE 250: Performed by: NURSE PRACTITIONER

## 2017-06-23 RX ORDER — MAGNESIUM SULFATE HEPTAHYDRATE 40 MG/ML
2 INJECTION, SOLUTION INTRAVENOUS ONCE
Status: COMPLETED | OUTPATIENT
Start: 2017-06-23 | End: 2017-06-23

## 2017-06-23 RX ORDER — LISINOPRIL 20 MG/1
20 TABLET ORAL DAILY
Status: DISCONTINUED | OUTPATIENT
Start: 2017-06-23 | End: 2017-06-26

## 2017-06-23 RX ADMIN — MEMANTINE HYDROCHLORIDE 10 MG: 10 TABLET, FILM COATED ORAL at 09:06

## 2017-06-23 RX ADMIN — LISINOPRIL 20 MG: 20 TABLET ORAL at 09:06

## 2017-06-23 RX ADMIN — AMLODIPINE BESYLATE 10 MG: 10 TABLET ORAL at 09:06

## 2017-06-23 RX ADMIN — Medication 3 ML: at 05:06

## 2017-06-23 RX ADMIN — METOPROLOL SUCCINATE 25 MG: 25 TABLET, EXTENDED RELEASE ORAL at 09:06

## 2017-06-23 RX ADMIN — CLOPIDOGREL 75 MG: 75 TABLET, FILM COATED ORAL at 09:06

## 2017-06-23 RX ADMIN — ATORVASTATIN CALCIUM 40 MG: 20 TABLET, FILM COATED ORAL at 09:06

## 2017-06-23 RX ADMIN — INSULIN DETEMIR 4 UNITS: 100 INJECTION, SOLUTION SUBCUTANEOUS at 09:06

## 2017-06-23 RX ADMIN — Medication 3 ML: at 09:06

## 2017-06-23 RX ADMIN — ENOXAPARIN SODIUM 40 MG: 100 INJECTION SUBCUTANEOUS at 04:06

## 2017-06-23 RX ADMIN — MAGNESIUM SULFATE IN WATER 2 G: 40 INJECTION, SOLUTION INTRAVENOUS at 09:06

## 2017-06-23 RX ADMIN — DONEPEZIL HYDROCHLORIDE 10 MG: 5 TABLET, FILM COATED ORAL at 09:06

## 2017-06-23 NOTE — PT/OT/SLP PROGRESS
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  Pt reported that his legs were fine (when asked if they both felt weak).  Pt's wife reported that when all this initially started both of his legs were buckling.  Pain/Comfort  Pain Rating 1: 0/10  Pain Rating Post-Intervention 1: 0/10    Objective:  Patient found with: telemetry, peripheral IV     Functional Mobility:  Bed Mobility:  Scooting/Bridging:  (Min (A) with scooting forward on EOB & back in chair)  Supine to Sit: Maximum Assistance    Transfers:   Sit <> Stand Assistance: Moderate Assistance (from EOB & chair; noted buckling of BLE during static standing trial requiring Total (A) to return safely to sitting in chair)  Sit <> Stand Assistive Device: No Assistive Device  Bed <> Chair Technique: Squat Pivot  Bed <> Chair Transfer Assistance: Moderate Assistance (from EOB to chair)  Bed <> Chair Assistive Device: No Assistive Device    Activities of Daily Living:     UE Dressing Level of Assistance: Maximum assistance (seated EOB)  LE Dressing Level of Assistance: Moderate assistance (donning socks seated EOB with (A) to initiate over toes & for LE positioning)  Toileting Where Assessed: Bed level  Toileting Level of Assistance: Total assistance (due to incontinent urine episode)     Therapeutic Activities and Exercises:  Pt required Mod-Total (A) with static standing due to buckling of BLE.  Pt required CGA-Mod (A) with postural control while  "seated EOB due to posterior leaning.  Pt performed reaching activities in all directions with BUE to facilitate postural control & ROM.  Pt performed AAROM with LUE in all planes x 10 reps each while seated in chair.  Pt had no further questions & when asked whether there were any concerns pt reported none.      AM-PAC 6 CLICK ADL   How much help from another person does this patient currently need?   1 = Unable, Total/Dependent Assistance  2 = A lot, Maximum/Moderate Assistance  3 = A little, Minimum/Contact Guard/Supervision  4 = None, Modified [place]/Independent    Putting on and taking off regular lower body clothing? : 2  Bathing (including washing, rinsing, drying)?: 2  Toileting, which includes using toilet, bedpan, or urinal? : 1  Putting on and taking off regular upper body clothing?: 2  Taking care of personal grooming such as brushing teeth?: 3  Eating meals?: 3  Total Score: 13     AM-PAC Raw Score CMS "G-Code Modifier Level of Impairment Assistance   6 % Total / Unable   7 - 8 CM 80 - 100% Maximal Assist   9-13 CL 60 - 80% Moderate Assist   14 - 19 CK 40 - 60% Moderate Assist   20 - 22 CJ 20 - 40% Minimal Assist   23 CI 1-20% SBA / CGA   24 CH 0% Independent/ Mod I       Patient left up in chair with all lines intact, call button in reach, RN notified, spouse present and white board updated.    ASSESSMENT:  Emmett Perez is a 84 y.o. 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  Pt with fluctuating levels of (A) needed for balance in both sitting unsupported & standing.  Pt continues to require (A) with all activities.    Rehab identified problem list/impairments: Rehab identified problem list/impairments: weakness, impaired endurance, impaired self care skills, impaired functional mobilty, impaired balance, impaired cognition, decreased lower extremity function, decreased upper extremity " function, decreased coordination, decreased safety awareness    Rehab potential is fair.    Activity tolerance: Fair    Discharge recommendations: Discharge Facility/Level Of Care Needs: rehabilitation facility     GOALS:    Occupational Therapy Goals        Problem: Occupational Therapy Goal    Goal Priority Disciplines Outcome Interventions   Occupational Therapy Goal     OT, PT/OT Ongoing (interventions implemented as appropriate)    Description:  Goals to be met by 7/6/2017:    1.  Sit eob with CG A for dynamic sitting balance during grooming tasks  2.  CG A to complete grooming tasks in supported sitting  3.  Complete UB dressing with min a  4.  Don shorts/underpants or pants with min a  5.  Toilet self with min a  6.  Toilet or BSC transfer with min a                    Plan:  Patient to be seen 6 x/week to address the above listed problems via self-care/home management, therapeutic activities, therapeutic exercises, sensory integration, cognitive retraining, neuromuscular re-education  Plan of Care expires: 07/21/17  Plan of Care reviewed with: patient, spouse         AIXA Murrell  06/23/2017

## 2017-06-23 NOTE — SUBJECTIVE & OBJECTIVE
Scheduled Medications:    amlodipine  10 mg Oral Daily    atorvastatin  40 mg Oral Daily    clopidogrel  75 mg Oral Daily    donepezil  10 mg Oral Daily    enoxaparin  40 mg Subcutaneous Daily    insulin detemir  4 Units Subcutaneous Daily    lisinopril  20 mg Oral Daily    magnesium sulfate IVPB  2 g Intravenous Once    memantine  10 mg Oral BID    metoprolol succinate  25 mg Oral Daily    sodium chloride 0.9%  3 mL Intravenous Q8H       PRN Medications: acetaminophen, dextrose 50%, dextrose 50%, glucagon (human recombinant), glucose, glucose, insulin aspart, labetalol, ondansetron, polyethylene glycol, sodium chloride 0.9%    Review of Systems   Constitutional: Positive for fatigue. Negative for chills and fever.   HENT: Negative for drooling, facial swelling, trouble swallowing and voice change.    Eyes: Negative for photophobia and visual disturbance.   Respiratory: Negative for cough, shortness of breath and wheezing.    Cardiovascular: Negative for chest pain and palpitations.   Gastrointestinal: Negative for abdominal distention, nausea and vomiting.   Genitourinary: Negative for difficulty urinating and flank pain.   Musculoskeletal: Negative for arthralgias, gait problem and myalgias.   Skin: Negative for color change and rash.   Neurological: Positive for facial asymmetry, speech difficulty and weakness. Negative for numbness.   Psychiatric/Behavioral: Positive for confusion. Negative for agitation.     Objective:     Vital Signs (Most Recent):  Temp: 97.8 °F (36.6 °C) (06/23/17 0800)  Pulse: 63 (06/23/17 0800)  Resp: 19 (06/23/17 0800)  BP: (!) 195/95 (06/23/17 0800)  SpO2: 96 % (06/23/17 0800)    Vital Signs (24h Range):  Temp:  [97.3 °F (36.3 °C)-97.8 °F (36.6 °C)] 97.8 °F (36.6 °C)  Pulse:  [50-72] 63  Resp:  [17-19] 19  SpO2:  [96 %-99 %] 96 %  BP: (166-195)/(84-95) 195/95     Physical Exam   Constitutional: He appears well-developed and well-nourished.   HENT:   Head: Normocephalic and  atraumatic.   Eyes: EOM are normal. Pupils are equal, round, and reactive to light.   Neck: Normal range of motion.   Cardiovascular: Normal rate and regular rhythm.    Pulmonary/Chest: Effort normal. No respiratory distress.   Abdominal: Soft. There is no tenderness.   Musculoskeletal: Normal range of motion.   Neurological:   Neurologic:  -  Mental Status:  Somnolent , open eyes to voice. AOx2. Oriented to person and  only. Follows commands.  Recalls 3/3 objects.    -  Speech and language:  + aphasia,  No dysarthria.    -mild facial droop  -  Coordination:  Finger to nose exam:  RUE normal, LUE dysmetria.   -  Motor:  RUE: 5/5, 5/5 .  LUE: 4/5, 4/5 .  RLE: 5/5, DF 5/5, PF 5/5.  LLE: 4/5, DF 4/5, PF 4/5.  -  Tone:  normal  -  Sensory:  Intact to light touch and pin prick.   Skin: Skin is warm and dry.   Psychiatric: Cognition and memory are impaired.

## 2017-06-23 NOTE — PROGRESS NOTES
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 Imaging positive for right thalamic infarct; lacunar infarct. Likely etiology of stroke is small vessel ischemic disease.  Antithrombotics for secondary stroke prevention: Antiplatelets:  Clopidogrel 75mg qd, Statins for secondary stroke prevention and hyperlipidemia, if present: Atorvastatin- 40 mg oral daily, Aggressive risk factor modification: Hypertension, Diabetes, High Cholesterol, Diet and Exercise, Rehab Efforts: Physical Therapy, Occupational Therapy and Speech and Language Pathology, Diagnostics: Ordered/Pending Other: none and VTE Prophylaxis: lovenox 40mg qd        Cytotoxic cerebral edema    2/2 stroke        Combined systolic and diastolic cardiac dysfunction    - ECHO with 35% EF; +diastolic dysfunction  - lisinopril 5mg qd        Cerebral microvascular disease    Likely etiology of stroke  - plan as above        Facial droop    2/2 stroke        Hemiparesis, left    2/2 stroke  PT/OT recommending rehab        Essential hypertension    Stroke risk factor  SBP <160  Lisinopril 5mg qd        Type 2 diabetes mellitus with diabetic polyneuropathy, with long-term current use of insulin    Stroke risk factor  - uncontrolled  HgBA1c 9.8  - detemir 4 unit qam  SSI        Chronic kidney disease, stage III (moderate)    Baseline Cr 1.3-1.9          Dispo: pending placement to rehab    Neurologic Chief Complaint: left sided weakness    Subjective:     Interval History: Patient is seen for follow-up neurological assessment and treatment recommendations:   No acute events overnight.     HPI, Past Medical, Family, and Social History remains the same as documented in the initial encounter.     Review of Systems   Constitutional: Negative for  fatigue and fever.   Respiratory: Negative for shortness of breath.    Cardiovascular: Negative for chest pain.   Neurological: Positive for weakness.     Scheduled Meds:   amlodipine  10 mg Oral Daily    atorvastatin  40 mg Oral Daily    clopidogrel  75 mg Oral Daily    donepezil  10 mg Oral Daily    enoxaparin  40 mg Subcutaneous Daily    insulin detemir  4 Units Subcutaneous Daily    lisinopril  20 mg Oral Daily    memantine  10 mg Oral BID    metoprolol succinate  25 mg Oral Daily    sodium chloride 0.9%  3 mL Intravenous Q8H     Continuous Infusions:   sodium chloride 0.9%       PRN Meds:acetaminophen, dextrose 50%, dextrose 50%, glucagon (human recombinant), glucose, glucose, insulin aspart, labetalol, ondansetron, polyethylene glycol, sodium chloride 0.9%    Objective:     Vital Signs (Most Recent):  Temp: 98.2 °F (36.8 °C) (06/23/17 1100)  Pulse: (!) 59 (06/23/17 1100)  Resp: 18 (06/23/17 1100)  BP: (!) 176/84 (06/23/17 1100)  SpO2: 98 % (06/23/17 1100)  BP Location: Left arm    Vital Signs Range (Last 24H):  Temp:  [97.3 °F (36.3 °C)-98.2 °F (36.8 °C)]   Pulse:  [50-72]   Resp:  [18-19]   BP: (166-195)/(84-95)   SpO2:  [96 %-99 %]   BP Location: Left arm    Physical Exam   Constitutional: He appears well-developed and well-nourished.   HENT:   Head: Normocephalic and atraumatic.   Eyes: Pupils are equal, round, and reactive to light.   Cardiovascular: Normal rate.        Neurological Exam:   LOC: alert and follows requests  Language: No aphasia  Speech: Dysarthria  Orientation: Person, Not oriented to time  Visual Fields (CN II): Full  EOM (CN III, IV, VI): Full/intact  Pupils (CN III, IV, VI): PERRL  Facial Sensation (CN V): Symmetric  Facial Movement (CN VII): lower weakness left lower  Shoulder/Neck (CN XI): SCM-Left: Normal ; SCM-Right: Normal ; Shoulder Shrug: Normal/Symetric  Tongue (CN XII): to midline  Motor*: Arm Left:  Paretic:  4/5, Leg Left:   Paretic:  3/5, Arm Right:   Normal (5/5),  Leg Right:   Normal (5/5)  Cerebellar*: Normal limb  Sensation: intact to light touch, temperature and vibration  Tone: Arm-Left: normal; Leg-Left: normal; Arm-Right: normal; Leg-Right: normal    NIH Stroke Scale:    Level of Consciousness: 0 - alert  LOC Questions: 1 - answers one correctly (at cognitive baseline from dementia)  LOC Commands: 0 - performs both correctly  Best Gaze: 0 - normal  Visual: 0 - no visual loss  Facial Palsy: 2 - partial  Motor Left Arm: 1 - drift  Motor Right Arm: 0 - no drift  Motor Left Le - can't resist gravity  Motor Right Le - no drift  Limb Ataxia: 0 - absent  Sensory: 0 - normal  Best Language: 0 - no aphasia  Dysarthria: 1 - mild to moderate dysarthria  Extinction and Inattention: 0 - no neglect  NIH Stroke Scale Total: 7      Laboratory:  CMP:     Recent Labs  Lab 17  0410   CALCIUM 9.6   ALBUMIN 3.4*   PROT 7.2      K 3.9   CO2 25      BUN 16   CREATININE 1.3   ALKPHOS 195*   ALT 19   AST 23   BILITOT 1.0     BMP:     Recent Labs  Lab 17  0410      K 3.9      CO2 25   BUN 16   CREATININE 1.3   CALCIUM 9.6     CBC:     Recent Labs  Lab 17  0410   WBC 7.17   RBC 5.48   HGB 15.3   HCT 43.9      MCV 80*   MCH 27.9   MCHC 34.9     Lipid Panel:     Recent Labs  Lab 17  0017   CHOL 133   LDLCALC 70.6   HDL 42   TRIG 102     Coagulation:     Recent Labs  Lab 17  0440   INR 1.0   APTT 21.8     Platelet Aggregation Study: No results for input(s): PLTAGG, PLTAGINTERP, PLTAGREGLACO, ADPPLTAGGREG in the last 168 hours.  Hgb A1C:     Recent Labs  Lab 17  001   HGBA1C 9.8*     TSH:     Recent Labs  Lab 17  0017   TSH 1.710       Diagnostic Results:  I have personally reviewed:   Findings:     MRI Brain WO contrast (17)  Right thalamic infarct. +cytotoxic cerebral edema.           Aníbal Martinez MD  [facility]  Department of Vascular Neurology   [facility]

## 2017-06-23 NOTE — PLAN OF CARE
Problem: Patient Care Overview  Goal: Plan of Care Review  Outcome: Ongoing (interventions implemented as appropriate)  POC reviewed with pt and spouse; both able to verbalize acceptance and understanding.  Pt's VS stable.  AAOx2 - d/o to time and situation.  Questions answered/encouraged; reassurance provided.  Call light in reach, side rails up x3, on camera, nonskid socks on, bed alarm set, bed in lowest position with wheels locked.  SCDs on.  Remains free from falls, skin breakdown, and injury.  Denies pain.  Tolerating diet well; ACHS accuchecks.  No acute events overnight.  Cane, urinal, and wife at bedside.  Will continue to monitor.

## 2017-06-23 NOTE — PLAN OF CARE
Problem: Occupational Therapy Goal  Goal: Occupational Therapy Goal  Goals to be met by 7/6/2017:    1.  Sit eob with CG A for dynamic sitting balance during grooming tasks  2.  CG A to complete grooming tasks in supported sitting  3.  Complete UB dressing with min a  4.  Don shorts/underpants or pants with min a  5.  Toilet self with min a  6.  Toilet or BSC transfer with min a   Outcome: Ongoing (interventions implemented as appropriate)  Goals remain appropriate

## 2017-06-23 NOTE — ASSESSMENT & PLAN NOTE
-  Encourage mobility, OOB in chair at least 3 hours per day, and ambulation.  -  PT/OT evaluate and treat.  Functional status: pending OT/PT evaluation.   -  SLP speech and cognitive evaluate and treat.   Cognitive/Speech/Language status:  Cognitive-linguistic impairments noted.   Nutrition/Swallow Status:  Passed bedside swallow evaluation.  Diet recommendation from SLP. Regular and thing liquids.   -  Monitor sleep disturbances and establish consistent sleep-wake cycle.  -  Monitor for bowel and bladder dysfunction.    -  Monitor for shoulder pain and subluxation.  -  Monitor for spasticity.  -  Monitor for skin breakdown and pressure ulcers (early mobility, repositioning/weight shifting every 20-30 minutes when sitting, turn patient every 2 hours, proper mattress/overlay and chair cushioning, and pressure relief/heel protector boots)  -  DVT prophylaxis:  Lovenox.  -  Reviewed discharge options with patient (inpatient rehab, SNF, home health therapy, and outpatient therapy).  Encourage participation with therapy.

## 2017-06-23 NOTE — PROGRESS NOTES
[facility]  Physical Medicine & Rehab  Progress Note    Patient Name: Emmett Perez  MRN: [id number]  Admission Date: 6/22/2017  Length of Stay: 1 days  Collaborating Physician : Camilo Navarro MD    Subjective:   Interval History (06/23/2017):  Patient is seen for follow-up rehab evaluation and recommendations.  No acute events over night.  Participating with therapy.  Barriers for discharge/rehab admission: further assess of cognitive level prior to admission.     HPI, Past Medical, Surgical, Family, and Social History remains the same as documented in the initial encounter.    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 Bed mobility Luciano-ModA.  Sit to stand Luciano-ModA & RW.  Ambulated 10ft x 2 trials with ModA & RW. UBD ModA and LBD ModA.  6/22/17: SLP evaluated. Diet Regular and thin liquids. Cognitive-linguistic impairments noted.  6/23/17: PT/OT evaluations pending.      Scheduled Medications:    amlodipine  10 mg Oral Daily    atorvastatin  40 mg Oral Daily    clopidogrel  75 mg Oral Daily    donepezil  10 mg Oral Daily    enoxaparin  40 mg Subcutaneous Daily    insulin detemir  4 Units Subcutaneous Daily    lisinopril  20 mg Oral Daily    magnesium sulfate IVPB  2 g Intravenous Once    memantine  10 mg Oral BID    metoprolol succinate  25 mg Oral Daily    sodium chloride 0.9%  3 mL Intravenous Q8H       PRN Medications: acetaminophen, dextrose 50%, dextrose 50%, glucagon (human recombinant), glucose, glucose, insulin aspart, labetalol, ondansetron, polyethylene glycol, sodium chloride 0.9%    Review of Systems   Constitutional: Positive for fatigue. Negative for chills and fever.   HENT: Negative for drooling, facial swelling, trouble swallowing and voice change.    Eyes: Negative for photophobia and visual disturbance.   Respiratory: Negative for cough, shortness of breath and wheezing.     Cardiovascular: Negative for chest pain and palpitations.   Gastrointestinal: Negative for abdominal distention, nausea and vomiting.   Genitourinary: Negative for difficulty urinating and flank pain.   Musculoskeletal: Negative for arthralgias, gait problem and myalgias.   Skin: Negative for color change and rash.   Neurological: Positive for facial asymmetry, speech difficulty and weakness. Negative for numbness.   Psychiatric/Behavioral: Positive for confusion. Negative for agitation.     Objective:     Vital Signs (Most Recent):  Temp: 97.8 °F (36.6 °C) (17 0800)  Pulse: 63 (17 0800)  Resp: 19 (17 08)  BP: (!) 195/95 (17 0800)  SpO2: 96 % (17)    Vital Signs (24h Range):  Temp:  [97.3 °F (36.3 °C)-97.8 °F (36.6 °C)] 97.8 °F (36.6 °C)  Pulse:  [50-72] 63  Resp:  [17-19] 19  SpO2:  [96 %-99 %] 96 %  BP: (166-195)/(84-95) 195/95     Physical Exam   Constitutional: He appears well-developed and well-nourished.   HENT:   Head: Normocephalic and atraumatic.   Eyes: EOM are normal. Pupils are equal, round, and reactive to light.   Neck: Normal range of motion.   Cardiovascular: Normal rate and regular rhythm.    Pulmonary/Chest: Effort normal. No respiratory distress.   Abdominal: Soft. There is no tenderness.   Musculoskeletal: Normal range of motion.   Neurological:   Neurologic:  -  Mental Status:  Somnolent , open eyes to voice. AOx2. Oriented to person and  only. Follows commands.  Recalls 3/3 objects.    -  Speech and language:  + aphasia,  No dysarthria.    -mild facial droop  -  Coordination:  Finger to nose exam:  RUE normal, LUE dysmetria.   -  Motor:  RUE: 5/5, 5/5 .  LUE: 4/5, 4/5 .  RLE: 5/5, DF 5/5, PF 5/5.  LLE: 4/5, DF 4/5, PF 4/5.  -  Tone:  normal  -  Sensory:  Intact to light touch and pin prick.   Skin: Skin is warm and dry.   Psychiatric: Cognition and memory are impaired.       Diagnostic Results:   Labs: Reviewed  CT: Reviewed  MRI: Reviewed  CTA:  reviewed  Assessment/Plan:      Facial droop    -2/2 stroke  -SLP evaluate and treat        Hemiparesis, left    -  Encourage mobility, OOB in chair at least 3 hours per day, and ambulation.  -  PT/OT evaluate and treat.  Functional status: pending OT/PT evaluation.   -  SLP speech and cognitive evaluate and treat.   Cognitive/Speech/Language status:  Cognitive-linguistic impairments noted.   Nutrition/Swallow Status:  Passed bedside swallow evaluation.  Diet recommendation from SLP. Regular and thing liquids.   -  Monitor sleep disturbances and establish consistent sleep-wake cycle.  -  Monitor for bowel and bladder dysfunction.    -  Monitor for shoulder pain and subluxation.  -  Monitor for spasticity.  -  Monitor for skin breakdown and pressure ulcers (early mobility, repositioning/weight shifting every 20-30 minutes when sitting, turn patient every 2 hours, proper mattress/overlay and chair cushioning, and pressure relief/heel protector boots)  -  DVT prophylaxis:  Lovenox.  -  Reviewed discharge options with patient (inpatient rehab, SNF, home health therapy, and outpatient therapy).  Encourage participation with therapy.          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  -  PT/OT evaluate and treat.  Functional status: pending OT/PT evaluation.   -  SLP speech and cognitive evaluate and treat.   Cognitive/Speech/Language status:  Cognitive-linguistic impairments noted.   Nutrition/Swallow Status:  Passed bedside swallow evaluation.  Diet recommendation from SLP. Regular and thing liquids.   -  Monitor sleep disturbances and establish consistent sleep-wake cycle.  -  Monitor for bowel and bladder dysfunction.    -  Monitor for shoulder pain and subluxation.  -  Monitor for spasticity.  -  Monitor for skin breakdown and pressure ulcers (early mobility, repositioning/weight shifting every 20-30 minutes when  sitting, turn patient every 2 hours, proper mattress/overlay and chair cushioning, and pressure relief/heel protector boots)  -  DVT prophylaxis:  Lovenox.  -  Reviewed discharge options with patient (inpatient rehab, SNF, home health therapy, and outpatient therapy).  Encourage participation with therapy.        Will follow patient's progress and discuss with rehab team for  rehab recommendation.     Thank you for your consult.     Barb Umanzor NP  Department of Physical Medicine & Rehab   [facility]

## 2017-06-23 NOTE — PLAN OF CARE
Plan is for pt to go to [facility] potentially Monday 6/26.     Kiran Gonzalez, SAIRA   [id number]

## 2017-06-23 NOTE — PLAN OF CARE
Problem: Patient Care Overview  Goal: Plan of Care Review  Outcome: Ongoing (interventions implemented as appropriate)  Pt remain free of falls, injury, and complaints throughout the shift. Generalized skin remains CDI with mild generalized edema noted; VSS. Pt awake, alert, and minimally confused throughout the shift. Infused Mg per order. Pt planning rehab [place] upon discharge. Pt tolerating plan of care.

## 2017-06-23 NOTE — PT/OT/SLP PROGRESS
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 : Yes  Keep patient NPO?: No   General Precautions: Standard, aspiration, fall          Subjective:  Awake, eyes closed throughout session    Pain/Comfort  Pain Rating 1: 0/10  Pain Rating Post-Intervention 1: 0/10    Objective:   Pt awake and oriented to person and place provided min cues in room. Pt's spouse at bedside states pt is closer to baseline cognitively today. He followed complex commands with 60% accuracy provided increased time and answer. Convergent categories task completed with 100% accuracy ind'ly with timely responses. Pt named 3 causes given a hypothetical scenario with 40% accuracy ind'ly increasing to 80% accuracy provided min cues. Pt required minimal redirection back to task at hand throughout tx. Pt's spouse at bedside reports pt tolerating PO intake without difficulty. Continue POC at this time.     Assessment:  Emmett Perez is a 84 y.o. 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    Discharge recommendations: Discharge Facility/Level Of Care Needs: rehabilitation facility     Goals:    SLP Goals        Problem: SLP Goal    Goal Priority Disciplines Outcome   SLP Goal     SLP    Description:  Speech Language Pathology Goals  Goals expected to be met by 6/29    1. Pt will tolerate regular diet/thin liquids without overt s/s of aspiration  2. Pt will orient to place and time  with mod cues  3. Pt will complete problem solving tasks with 70% accuracy and min cues  4. Pt will follow complex commands with 80% accuracy   5. Pt will participate in ongoing assessment of reading,writing and visual spatial aspects.                          Plan:   Patient to be seen Therapy Frequency: 5 x/week   Plan of Care expires: 07/21/17  Plan of Care reviewed with: patient, spouse  SLP Follow-up?: Yes              Gardenia Yañez CCC-SLP   Speech Language Pathologist  Pager [phone]  06/23/2017

## 2017-06-23 NOTE — SUBJECTIVE & OBJECTIVE
Neurologic Chief Complaint: left sided weakness    Subjective:     Interval History: Patient is seen for follow-up neurological assessment and treatment recommendations:   No acute events overnight.     HPI, Past Medical, Family, and Social History remains the same as documented in the initial encounter.     Review of Systems   Constitutional: Negative for fatigue and fever.   Respiratory: Negative for shortness of breath.    Cardiovascular: Negative for chest pain.   Neurological: Positive for weakness.     Scheduled Meds:   amlodipine  10 mg Oral Daily    atorvastatin  40 mg Oral Daily    clopidogrel  75 mg Oral Daily    donepezil  10 mg Oral Daily    enoxaparin  40 mg Subcutaneous Daily    insulin detemir  4 Units Subcutaneous Daily    lisinopril  20 mg Oral Daily    memantine  10 mg Oral BID    metoprolol succinate  25 mg Oral Daily    sodium chloride 0.9%  3 mL Intravenous Q8H     Continuous Infusions:   sodium chloride 0.9%       PRN Meds:acetaminophen, dextrose 50%, dextrose 50%, glucagon (human recombinant), glucose, glucose, insulin aspart, labetalol, ondansetron, polyethylene glycol, sodium chloride 0.9%    Objective:     Vital Signs (Most Recent):  Temp: 98.2 °F (36.8 °C) (06/23/17 1100)  Pulse: (!) 59 (06/23/17 1100)  Resp: 18 (06/23/17 1100)  BP: (!) 176/84 (06/23/17 1100)  SpO2: 98 % (06/23/17 1100)  BP Location: Left arm    Vital Signs Range (Last 24H):  Temp:  [97.3 °F (36.3 °C)-98.2 °F (36.8 °C)]   Pulse:  [50-72]   Resp:  [18-19]   BP: (166-195)/(84-95)   SpO2:  [96 %-99 %]   BP Location: Left arm    Physical Exam   Constitutional: He appears well-developed and well-nourished.   HENT:   Head: Normocephalic and atraumatic.   Eyes: Pupils are equal, round, and reactive to light.   Cardiovascular: Normal rate.        Neurological Exam:   LOC: alert and follows requests  Language: No aphasia  Speech: Dysarthria  Orientation: Person, Not oriented to time  Visual Fields (CN II): Full  EOM (CN  III, IV, VI): Full/intact  Pupils (CN III, IV, VI): PERRL  Facial Sensation (CN V): Symmetric  Facial Movement (CN VII): lower weakness left lower  Shoulder/Neck (CN XI): SCM-Left: Normal ; SCM-Right: Normal ; Shoulder Shrug: Normal/Symetric  Tongue (CN XII): to midline  Motor*: Arm Left:  Paretic:  4/5, Leg Left:   Paretic:  3/5, Arm Right:   Normal (5/5), Leg Right:   Normal (5/5)  Cerebellar*: Normal limb  Sensation: intact to light touch, temperature and vibration  Tone: Arm-Left: normal; Leg-Left: normal; Arm-Right: normal; Leg-Right: normal    NIH Stroke Scale:    Level of Consciousness: 0 - alert  LOC Questions: 1 - answers one correctly (at cognitive baseline from dementia)  LOC Commands: 0 - performs both correctly  Best Gaze: 0 - normal  Visual: 0 - no visual loss  Facial Palsy: 2 - partial  Motor Left Arm: 1 - drift  Motor Right Arm: 0 - no drift  Motor Left Le - can't resist gravity  Motor Right Le - no drift  Limb Ataxia: 0 - absent  Sensory: 0 - normal  Best Language: 0 - no aphasia  Dysarthria: 1 - mild to moderate dysarthria  Extinction and Inattention: 0 - no neglect  NIH Stroke Scale Total: 7      Laboratory:  CMP:     Recent Labs  Lab 17  0410   CALCIUM 9.6   ALBUMIN 3.4*   PROT 7.2      K 3.9   CO2 25      BUN 16   CREATININE 1.3   ALKPHOS 195*   ALT 19   AST 23   BILITOT 1.0     BMP:     Recent Labs  Lab 17  0410      K 3.9      CO2 25   BUN 16   CREATININE 1.3   CALCIUM 9.6     CBC:     Recent Labs  Lab 17  0410   WBC 7.17   RBC 5.48   HGB 15.3   HCT 43.9      MCV 80*   MCH 27.9   MCHC 34.9     Lipid Panel:     Recent Labs  Lab 17  0017   CHOL 133   LDLCALC 70.6   HDL 42   TRIG 102     Coagulation:     Recent Labs  Lab 17  0440   INR 1.0   APTT 21.8     Platelet Aggregation Study: No results for input(s): PLTAGG, PLTAGINTERP, PLTAGREGLACO, ADPPLTAGGREG in the last 168 hours.  Hgb A1C:     Recent Labs  Lab 17  0017    HGBA1C 9.8*     TSH:     Recent Labs  Lab 06/22/17  0017   TSH 1.710       Diagnostic Results:  I have personally reviewed:   Findings:     MRI Brain WO contrast (6/22/17)  Right thalamic infarct. +cytotoxic cerebral edema.

## 2017-06-23 NOTE — PT/OT/SLP PROGRESS
Physical Therapy  Treatment    Emmett Perez   MRN: [id number]   Admitting Diagnosis: 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    PT Received On: 06/23/17  PT Start Time: 1305     PT Stop Time: 1333    PT Total Time (min): 28 min       Billable Minutes:  Gait [id number] and Therapeutic Activity 10    Treatment Type: Treatment  PT/PTA: PT           General Precautions: Standard, aspiration, fall  Orthopedic Precautions: N/A   Braces: N/A    Do you have any cultural, spiritual, Protestant conflicts, given your current situation?: none noted    Subjective:  Communicated with nurse prior to session.  Pt's wife states he was restless most of the night , pt states he needs to sleep (in early am) pt remained lethargic but agreeable to to work with PT in early afternoon  Pain/Comfort  Pain Rating 1: 0/10  Pain Rating Post-Intervention 1: 0/10    Objective:   Patient found with: telemetry    Functional Mobility:  Bed Mobility:   Supine to Sit:  (pt up in bedside chair upon PT arrival)  Sit to Supine: Moderate Assistance, With leg lift (pt requires assist to lift B LEs onto the bed)    Transfers:  Sit <> Stand Assistance: Moderate Assistance, Maximum Assistance (3 trials from bedside chair; 2 trials from bedside chair with max assist then 3rd trial with moderate assist)  Sit <> Stand Assistive Device: Rolling Walker  Bed <> Chair Technique: Stand Pivot (bedside chair to bed)  Bed <> Chair Assistance: Maximum Assistance  Bed <> Chair Assistive Device: No Assistive Device    Gait:   Gait Distance: 10ft then 8ft then 14ft with Rw with seated rest periods in between with L sided lean and with max manual cues to facilitate advancing L foot and for L foot placement due to pt tends to place his foot too close to midline, at times crossing it over his R foot.   Assistance 1: Total assistance (max assist of 1 and 1 person to follow with bedside chair)  Gait Assistive Device: Rolling walker  Gait Deviation(s): decreased  jose, decreased step length, decreased toe-to-floor clearance, lateral lean    Balance:   Static Sit: POOR+: Needs MINIMAL assist to maintain  Dynamic Sit: FAIR: Cannot move trunk without losing balance  Static Stand: POOR: Needs MODERATE assist to maintain    AM-PAC 6 CLICK MOBILITY  How much help from another person does this patient currently need?   1 = Unable, Total/Dependent Assistance  2 = A lot, Maximum/Moderate Assistance  3 = A little, Minimum/Contact Guard/Supervision  4 = None, Modified [place]/Independent    Turning over in bed (including adjusting bedclothes, sheets and blankets)?: 3  Sitting down on and standing up from a chair with arms (e.g., wheelchair, bedside commode, etc.): 2  Moving from lying on back to sitting on the side of the bed?: 2  Moving to and from a bed to a chair (including a wheelchair)?: 2  Need to walk in hospital room?: 2  Climbing 3-5 steps with a railing?: 1  Total Score: 12    AM-PAC Raw Score CMS G-Code Modifier Level of Impairment Assistance   6 % Total / Unable   7 - 9 CM 80 - 100% Maximal Assist   10 - 14 CL 60 - 80% Moderate Assist   15 - 19 CK 40 - 60% Moderate Assist   20 - 22 CJ 20 - 40% Minimal Assist   23 CI 1-20% SBA / CGA   24 CH 0% Independent/ Mod I     Patient left supine with all lines intact, call button in reach, bed alarm on and wife present.    Assessment:  Emmett Perez is a 84 y.o. 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 Pt appears motivated to walk.    Rehab identified problem list/impairments: Rehab identified problem list/impairments: weakness, impaired sensation, impaired self care skills, gait instability, impaired functional mobilty, impaired balance, impaired cognition, decreased coordination, decreased upper extremity function, decreased lower  extremity function, decreased safety awareness    Rehab potential is good.    Activity tolerance: Good    Discharge recommendations: Discharge Facility/Level Of Care Needs: nursing facility, basic     Barriers to discharge: Barriers to Discharge: Decreased caregiver support    Equipment recommendations: Equipment Needed After Discharge: bedside commode, wheelchair     GOALS:    Physical Therapy Goals        Problem: Physical Therapy Goal    Goal Priority Disciplines Outcome Goal Variances Interventions   Physical Therapy Goal     PT/OT, PT Ongoing (interventions implemented as appropriate)     Description:  PT goals until 6/30/17    1. Pt supine to sit with SBA-not met  2. Pt sit to supine with SBA-not met  3. Pt sit to stand with RW with CGA-not met  4. Pt to perform gait 100ft with RW with CGA.-not met  5. Pt to transfer bed to/from bedside chair with CGA.-not met  6. Pt to up/down 2 steps with no rail with minimal assist.-not met  7. Pt to perform B LE exs in sitting or supine x 15 reps to increase LE strength to improve functional mobility.-not met                  PLAN:    Patient to be seen 6 x/week  to address the above listed problems via gait training, therapeutic activities, therapeutic exercises, neuromuscular re-education  Plan of Care expires: 07/22/17  Plan of Care reviewed with: patient, spouse  Princess ISABELLA Finn, PT  06/23/2017

## 2017-06-23 NOTE — PLAN OF CARE
Problem: SLP Goal  Goal: SLP Goal  Speech Language Pathology Goals  Goals expected to be met by 6/29    1. Pt will tolerate regular diet/thin liquids without overt s/s of aspiration  2. Pt will orient to place and time with mod cues  3. Pt will complete problem solving tasks with 70% accuracy and min cues  4. Pt will follow complex commands with 80% accuracy   5. Pt will participate in ongoing assessment of reading,writing and visual spatial aspects.        Continue POC at this time, all goals remain appropriate  Gardenia Yañez CCC-SLP  6/23/2017

## 2017-06-23 NOTE — PLAN OF CARE
Problem: Physical Therapy Goal  Goal: Physical Therapy Goal  PT goals until 6/30/17    1. Pt supine to sit with SBA-not met  2. Pt sit to supine with SBA-not met  3. Pt sit to stand with RW with CGA-not met  4. Pt to perform gait 100ft with RW with CGA.-not met  5. Pt to transfer bed to/from bedside chair with CGA.-not met  6. Pt to up/down 2 steps with no rail with minimal assist.-not met  7. Pt to perform B LE exs in sitting or supine x 15 reps to increase LE strength to improve functional mobility.-not met     Outcome: Ongoing (interventions implemented as appropriate)  Pt's goals remain appropriate and pt will continue to benefit from skilled PT services to work towards improved functional mobility including: bed mobility, transfers, and gait.   Princess Finn, PT  6/23/2017

## 2017-06-24 LAB
ALBUMIN SERPL BCP-MCNC: 3.4 G/DL
ALP SERPL-CCNC: 174 U/L
ALT SERPL W/O P-5'-P-CCNC: 16 U/L
ANION GAP SERPL CALC-SCNC: 8 MMOL/L
AST SERPL-CCNC: 21 U/L
BASOPHILS # BLD AUTO: 0.02 K/UL
BASOPHILS NFR BLD: 0.3 %
BILIRUB SERPL-MCNC: 1.3 MG/DL
BUN SERPL-MCNC: 24 MG/DL
CALCIUM SERPL-MCNC: 9.3 MG/DL
CHLORIDE SERPL-SCNC: 105 MMOL/L
CO2 SERPL-SCNC: 25 MMOL/L
CREAT SERPL-MCNC: 1.3 MG/DL
DIFFERENTIAL METHOD: ABNORMAL
EOSINOPHIL # BLD AUTO: 0.1 K/UL
EOSINOPHIL NFR BLD: 1 %
ERYTHROCYTE [DISTWIDTH] IN BLOOD BY AUTOMATED COUNT: 12.6 %
EST. GFR  (AFRICAN AMERICAN): 57.9 ML/MIN/1.73 M^2
EST. GFR  (NON AFRICAN AMERICAN): 50.1 ML/MIN/1.73 M^2
GLUCOSE SERPL-MCNC: 144 MG/DL
HCT VFR BLD AUTO: 43.2 %
HGB BLD-MCNC: 15.1 G/DL
LYMPHOCYTES # BLD AUTO: 2 K/UL
LYMPHOCYTES NFR BLD: 27.8 %
MCH RBC QN AUTO: 28 PG
MCHC RBC AUTO-ENTMCNC: 35 %
MCV RBC AUTO: 80 FL
MONOCYTES # BLD AUTO: 0.4 K/UL
MONOCYTES NFR BLD: 5.6 %
NEUTROPHILS # BLD AUTO: 4.6 K/UL
NEUTROPHILS NFR BLD: 65.2 %
PLATELET # BLD AUTO: 223 K/UL
PMV BLD AUTO: 10.9 FL
POCT GLUCOSE: 224 MG/DL (ref 70–110)
POTASSIUM SERPL-SCNC: 3.9 MMOL/L
PROT SERPL-MCNC: 7 G/DL
RBC # BLD AUTO: 5.4 M/UL
SODIUM SERPL-SCNC: 138 MMOL/L
WBC # BLD AUTO: 7.12 K/UL

## 2017-06-24 PROCEDURE — 80053 COMPREHEN METABOLIC PANEL: CPT

## 2017-06-24 PROCEDURE — 25000003 PHARM REV CODE 250: Performed by: PSYCHIATRY & NEUROLOGY

## 2017-06-24 PROCEDURE — 97112 NEUROMUSCULAR REEDUCATION: CPT

## 2017-06-24 PROCEDURE — 85025 COMPLETE CBC W/AUTO DIFF WBC: CPT

## 2017-06-24 PROCEDURE — 20600001 HC STEP DOWN PRIVATE ROOM

## 2017-06-24 PROCEDURE — 97530 THERAPEUTIC ACTIVITIES: CPT

## 2017-06-24 PROCEDURE — 99233 SBSQ HOSP IP/OBS HIGH 50: CPT | Mod: GC,,, | Performed by: PSYCHIATRY & NEUROLOGY

## 2017-06-24 PROCEDURE — 36415 COLL VENOUS BLD VENIPUNCTURE: CPT

## 2017-06-24 PROCEDURE — 25000003 PHARM REV CODE 250: Performed by: NURSE PRACTITIONER

## 2017-06-24 RX ORDER — POLYETHYLENE GLYCOL 3350 17 G/17G
17 POWDER, FOR SOLUTION ORAL ONCE
Status: DISCONTINUED | OUTPATIENT
Start: 2017-06-24 | End: 2017-06-29 | Stop reason: HOSPADM

## 2017-06-24 RX ORDER — POLYETHYLENE GLYCOL 3350 17 G/17G
17 POWDER, FOR SOLUTION ORAL DAILY PRN
Status: DISCONTINUED | OUTPATIENT
Start: 2017-06-24 | End: 2017-06-26

## 2017-06-24 RX ORDER — AMOXICILLIN 250 MG
1 CAPSULE ORAL 2 TIMES DAILY
Status: DISCONTINUED | OUTPATIENT
Start: 2017-06-24 | End: 2017-06-29 | Stop reason: HOSPADM

## 2017-06-24 RX ADMIN — LISINOPRIL 20 MG: 20 TABLET ORAL at 08:06

## 2017-06-24 RX ADMIN — AMLODIPINE BESYLATE 10 MG: 10 TABLET ORAL at 08:06

## 2017-06-24 RX ADMIN — METOPROLOL SUCCINATE 25 MG: 25 TABLET, EXTENDED RELEASE ORAL at 08:06

## 2017-06-24 RX ADMIN — MEMANTINE HYDROCHLORIDE 10 MG: 10 TABLET, FILM COATED ORAL at 08:06

## 2017-06-24 RX ADMIN — MEMANTINE HYDROCHLORIDE 10 MG: 10 TABLET, FILM COATED ORAL at 10:06

## 2017-06-24 RX ADMIN — Medication 3 ML: at 05:06

## 2017-06-24 RX ADMIN — INSULIN DETEMIR 4 UNITS: 100 INJECTION, SOLUTION SUBCUTANEOUS at 08:06

## 2017-06-24 RX ADMIN — ATORVASTATIN CALCIUM 40 MG: 20 TABLET, FILM COATED ORAL at 08:06

## 2017-06-24 RX ADMIN — CLOPIDOGREL 75 MG: 75 TABLET, FILM COATED ORAL at 08:06

## 2017-06-24 RX ADMIN — Medication 3 ML: at 10:06

## 2017-06-24 RX ADMIN — DONEPEZIL HYDROCHLORIDE 10 MG: 5 TABLET, FILM COATED ORAL at 08:06

## 2017-06-24 NOTE — PLAN OF CARE
Problem: Patient Care Overview  Goal: Plan of Care Review  Outcome: Ongoing (interventions implemented as appropriate)  Pt remain free of falls, injury, and complaints throughout the shift. Generalized skin remains CDI with no edema noted; VSS. Pt glucose remain diet controlled. Pt experiencing increasing LSW post CVA. PT/OT working with pt daily. Pt tolerating plan of care.

## 2017-06-24 NOTE — PROGRESS NOTES
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 Imaging positive for right thalamocapsular infarct; lacunar infarct. Likely etiology of stroke is small vessel ischemic disease.  Antithrombotics for secondary stroke prevention: Antiplatelets:  Clopidogrel 75mg qd, Statins for secondary stroke prevention and hyperlipidemia, if present: Atorvastatin- 40 mg oral daily, Aggressive risk factor modification: Hypertension, Diabetes, High Cholesterol, Diet and Exercise, Rehab Efforts: Physical Therapy, Occupational Therapy and Speech and Language Pathology, Diagnostics: Ordered/Pending Other: none and VTE Prophylaxis: lovenox 40mg qd        Cytotoxic cerebral edema    2/2 stroke  Noted on imaging        Combined systolic and diastolic cardiac dysfunction    - ECHO with 35% EF; +diastolic dysfunction  - lisinopril 5mg qd        Cerebral microvascular disease    Likely etiology of stroke  - plan as above        Facial droop    2/2 stroke        Hemiparesis, left    2/2 stroke  PT/OT recommending rehab        Essential hypertension    Stroke risk factor  SBP <160  Lisinopril 5mg qd        Type 2 diabetes mellitus with diabetic polyneuropathy, with long-term current use of insulin    Stroke risk factor  - uncontrolled  HgBA1c 9.8  - detemir 4 unit qam  SSI        Chronic kidney disease, stage III (moderate)    Baseline Cr 1.3-1.9          Dispo: pending placement - PT/OT recommending rehab    Neurologic Chief Complaint: left sided weakness    Subjective:     Interval History: Patient is seen for follow-up neurological assessment and treatment recommendations:   No acute events overnight.     HPI, Past Medical, Family, and Social History remains the same as documented in the initial encounter.     Review  of Systems   Constitutional: Negative for fatigue and fever.   Respiratory: Negative for shortness of breath.    Cardiovascular: Negative for chest pain.   Gastrointestinal: Positive for constipation.   Neurological: Positive for weakness.     Scheduled Meds:   amlodipine  10 mg Oral Daily    atorvastatin  40 mg Oral Daily    clopidogrel  75 mg Oral Daily    donepezil  10 mg Oral Daily    enoxaparin  40 mg Subcutaneous Daily    insulin detemir  4 Units Subcutaneous Daily    lisinopril  20 mg Oral Daily    memantine  10 mg Oral BID    metoprolol succinate  25 mg Oral Daily    polyethylene glycol  17 g Oral Once    senna-docusate 8.6-50 mg  1 tablet Oral BID    sodium chloride 0.9%  3 mL Intravenous Q8H     Continuous Infusions:   sodium chloride 0.9%       PRN Meds:acetaminophen, dextrose 50%, dextrose 50%, glucagon (human recombinant), glucose, glucose, insulin aspart, labetalol, ondansetron, polyethylene glycol, polyethylene glycol, sodium chloride 0.9%    Objective:     Vital Signs (Most Recent):  Temp: 96.8 °F (36 °C) (06/24/17 1200)  Pulse: 86 (06/24/17 1200)  Resp: 18 (06/24/17 1200)  BP: 128/60 (06/24/17 1200)  SpO2: 95 % (06/24/17 1200)  BP Location: Left arm    Vital Signs Range (Last 24H):  Temp:  [96.8 °F (36 °C)-97.9 °F (36.6 °C)]   Pulse:  [53-86]   Resp:  [17-19]   BP: (128-155)/(60-83)   SpO2:  [95 %-99 %]   BP Location: Left arm    Physical Exam   Constitutional: He appears well-developed and well-nourished.   HENT:   Head: Normocephalic and atraumatic.   Eyes: Pupils are equal, round, and reactive to light.   Cardiovascular: Normal rate.        Neurological Exam:   LOC: alert and follows requests  Language: No aphasia  Speech: Dysarthria  Orientation: Person, Not oriented to time  Visual Fields (CN II): Full  EOM (CN III, IV, VI): Full/intact  Pupils (CN III, IV, VI): PERRL  Facial Sensation (CN V): Symmetric  Facial Movement (CN VII): lower weakness left lower  Shoulder/Neck (CN XI):  SCM-Left: Normal ; SCM-Right: Normal ; Shoulder Shrug: Normal/Symetric  Tongue (CN XII): to midline  Motor*: Arm Left:  Paretic:  4/5, Leg Left:   Paretic:  3/5, Arm Right:   Normal (5/5), Leg Right:   Normal (5/5)  Cerebellar*: Normal limb  Sensation: intact to light touch, temperature and vibration  Tone: Arm-Left: normal; Leg-Left: normal; Arm-Right: normal; Leg-Right: normal    NIH Stroke Scale:    Level of Consciousness: 0 - alert  LOC Questions: 1 - answers one correctly (at cognitive baseline from dementia)  LOC Commands: 0 - performs both correctly  Best Gaze: 0 - normal  Visual: 0 - no visual loss  Facial Palsy: 2 - partial  Motor Left Arm: 1 - drift  Motor Right Arm: 0 - no drift  Motor Left Le - can't resist gravity  Motor Right Le - no drift  Limb Ataxia: 0 - absent  Sensory: 0 - normal  Best Language: 0 - no aphasia  Dysarthria: 1 - mild to moderate dysarthria  Extinction and Inattention: 0 - no neglect  NIH Stroke Scale Total: 7      Laboratory:  CMP:     Recent Labs  Lab 17   CALCIUM 9.3   ALBUMIN 3.4*   PROT 7.0      K 3.9   CO2 25      BUN 24*   CREATININE 1.3   ALKPHOS 174*   ALT 16   AST 21   BILITOT 1.3*     BMP:     Recent Labs  Lab 17  0415      K 3.9      CO2 25   BUN 24*   CREATININE 1.3   CALCIUM 9.3     CBC:     Recent Labs  Lab 175   WBC 7.12   RBC 5.40   HGB 15.1   HCT 43.2      MCV 80*   MCH 28.0   MCHC 35.0     Lipid Panel:     Recent Labs  Lab 17  001   CHOL 133   LDLCALC 70.6   HDL 42   TRIG 102     Coagulation:     Recent Labs  Lab 17  0440   INR 1.0   APTT 21.8     Platelet Aggregation Study: No results for input(s): PLTAGG, PLTAGINTERP, PLTAGREGLACO, ADPPLTAGGREG in the last 168 hours.  Hgb A1C:     Recent Labs  Lab 17   HGBA1C 9.8*     TSH:     Recent Labs  Lab 17   TSH 1.710       Diagnostic Results:  I have personally reviewed:   Findings:     MRI Brain WO contrast  (6/22/17)  Right thalamic infarct. +cytotoxic cerebral edema.           Aníbal Martinez MD  Comprehensive Stroke Center  Department of Vascular Neurology   [facility]

## 2017-06-24 NOTE — ASSESSMENT & PLAN NOTE
84 yr old male with PMHx of HTN, DMII, HLD, dementia, CKD presenting with left hemiparesis. Imaging positive for right thalamocapsular infarct; lacunar infarct. Likely etiology of stroke is small vessel ischemic disease.  Antithrombotics for secondary stroke prevention: Antiplatelets:  Clopidogrel 75mg qd, Statins for secondary stroke prevention and hyperlipidemia, if present: Atorvastatin- 40 mg oral daily, Aggressive risk factor modification: Hypertension, Diabetes, High Cholesterol, Diet and Exercise, Rehab Efforts: Physical Therapy, Occupational Therapy and Speech and Language Pathology, Diagnostics: Ordered/Pending Other: none and VTE Prophylaxis: lovenox 40mg qd

## 2017-06-24 NOTE — SUBJECTIVE & OBJECTIVE
Neurologic Chief Complaint: left sided weakness    Subjective:     Interval History: Patient is seen for follow-up neurological assessment and treatment recommendations:   No acute events overnight.     HPI, Past Medical, Family, and Social History remains the same as documented in the initial encounter.     Review of Systems   Constitutional: Negative for fatigue and fever.   Respiratory: Negative for shortness of breath.    Cardiovascular: Negative for chest pain.   Gastrointestinal: Positive for constipation.   Neurological: Positive for weakness.     Scheduled Meds:   amlodipine  10 mg Oral Daily    atorvastatin  40 mg Oral Daily    clopidogrel  75 mg Oral Daily    donepezil  10 mg Oral Daily    enoxaparin  40 mg Subcutaneous Daily    insulin detemir  4 Units Subcutaneous Daily    lisinopril  20 mg Oral Daily    memantine  10 mg Oral BID    metoprolol succinate  25 mg Oral Daily    polyethylene glycol  17 g Oral Once    senna-docusate 8.6-50 mg  1 tablet Oral BID    sodium chloride 0.9%  3 mL Intravenous Q8H     Continuous Infusions:   sodium chloride 0.9%       PRN Meds:acetaminophen, dextrose 50%, dextrose 50%, glucagon (human recombinant), glucose, glucose, insulin aspart, labetalol, ondansetron, polyethylene glycol, polyethylene glycol, sodium chloride 0.9%    Objective:     Vital Signs (Most Recent):  Temp: 96.8 °F (36 °C) (06/24/17 1200)  Pulse: 86 (06/24/17 1200)  Resp: 18 (06/24/17 1200)  BP: 128/60 (06/24/17 1200)  SpO2: 95 % (06/24/17 1200)  BP Location: Left arm    Vital Signs Range (Last 24H):  Temp:  [96.8 °F (36 °C)-97.9 °F (36.6 °C)]   Pulse:  [53-86]   Resp:  [17-19]   BP: (128-155)/(60-83)   SpO2:  [95 %-99 %]   BP Location: Left arm    Physical Exam   Constitutional: He appears well-developed and well-nourished.   HENT:   Head: Normocephalic and atraumatic.   Eyes: Pupils are equal, round, and reactive to light.   Cardiovascular: Normal rate.        Neurological Exam:   LOC: alert  and follows requests  Language: No aphasia  Speech: Dysarthria  Orientation: Person, Not oriented to time  Visual Fields (CN II): Full  EOM (CN III, IV, VI): Full/intact  Pupils (CN III, IV, VI): PERRL  Facial Sensation (CN V): Symmetric  Facial Movement (CN VII): lower weakness left lower  Shoulder/Neck (CN XI): SCM-Left: Normal ; SCM-Right: Normal ; Shoulder Shrug: Normal/Symetric  Tongue (CN XII): to midline  Motor*: Arm Left:  Paretic:  4/5, Leg Left:   Paretic:  3/5, Arm Right:   Normal (5/5), Leg Right:   Normal (5/5)  Cerebellar*: Normal limb  Sensation: intact to light touch, temperature and vibration  Tone: Arm-Left: normal; Leg-Left: normal; Arm-Right: normal; Leg-Right: normal    NIH Stroke Scale:    Level of Consciousness: 0 - alert  LOC Questions: 1 - answers one correctly (at cognitive baseline from dementia)  LOC Commands: 0 - performs both correctly  Best Gaze: 0 - normal  Visual: 0 - no visual loss  Facial Palsy: 2 - partial  Motor Left Arm: 1 - drift  Motor Right Arm: 0 - no drift  Motor Left Le - can't resist gravity  Motor Right Le - no drift  Limb Ataxia: 0 - absent  Sensory: 0 - normal  Best Language: 0 - no aphasia  Dysarthria: 1 - mild to moderate dysarthria  Extinction and Inattention: 0 - no neglect  NIH Stroke Scale Total: 7      Laboratory:  CMP:     Recent Labs  Lab 17  0415   CALCIUM 9.3   ALBUMIN 3.4*   PROT 7.0      K 3.9   CO2 25      BUN 24*   CREATININE 1.3   ALKPHOS 174*   ALT 16   AST 21   BILITOT 1.3*     BMP:     Recent Labs  Lab 17  0415      K 3.9      CO2 25   BUN 24*   CREATININE 1.3   CALCIUM 9.3     CBC:     Recent Labs  Lab 175   WBC 7.12   RBC 5.40   HGB 15.1   HCT 43.2      MCV 80*   MCH 28.0   MCHC 35.0     Lipid Panel:     Recent Labs  Lab 17  0017   CHOL 133   LDLCALC 70.6   HDL 42   TRIG 102     Coagulation:     Recent Labs  Lab 17  0440   INR 1.0   APTT 21.8     Platelet Aggregation Study:  No results for input(s): PLTAGG, PLTAGINTERP, PLTAGREGLACO, ADPPLTAGGREG in the last 168 hours.  Hgb A1C:     Recent Labs  Lab 06/22/17 0017   HGBA1C 9.8*     TSH:     Recent Labs  Lab 06/22/17 0017   TSH 1.710       Diagnostic Results:  I have personally reviewed:   Findings:     MRI Brain WO contrast (6/22/17)  Right thalamic infarct. +cytotoxic cerebral edema.

## 2017-06-24 NOTE — PT/OT/SLP PROGRESS
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      Pain/Comfort  Pain Rating 1: 0/10    Objective:   Patient found with: telemetry.  Pt found sup in bed with no family present    Functional Mobility:  Bed Mobility:   Rolling/Turning to Left: Moderate assistance  Scooting Minimum Assistance ( along EOB to the L )  Bridgeing : min assistance ( to HOB )  Supine to Sit: Maximum Assistance from L side lying  Sit to Supine: Maximum Assistance    Transfers:  Sit <> Stand Assistance: Moderate Assistance from EOB x2 trials (TC and VC for knee and hip ext. Forward posture and keeping head up and looking forward)  Sit <> Stand Assistive Device: Rolling Walker (VC to push up from EOB with UE )    Standing  Pt tolerates static standing with RW mod A for weight shift, upright posture and L LE control x2 trials of 2-3 min each  Pt demonstrates L lateral lean requiring vc's and tc's to correct.    Balance:   Static Sit: POOR: Needs MODERATE assist to maintain   Dynamic Sit: FAIR: Cannot move trunk without losing balance  Static Stand: POOR: Needs MODERATE assist to maintain    EOB : 10- 15 min Mod assist to Max assist due to leaning forward and to the left 2* to patient lethargy . VC to keep Pt's attention on task and for midline orientation.     AM-PAC 6 CLICK MOBILITY  How much help from another person does this patient currently need?   1  = Unable, Total/Dependent Assistance  2 = A lot, Maximum/Moderate Assistance  3 = A little, Minimum/Contact Guard/Supervision  4 = None, Modified Humphreys/Independent    Turning over in bed (including adjusting bedclothes, sheets and blankets)?: 2  Sitting down on and standing up from a chair with arms (e.g., wheelchair, bedside commode, etc.): 2  Moving from lying on back to sitting on the side of the bed?: 2  Moving to and from a bed to a chair (including a wheelchair)?: 2  Need to walk in hospital room?: 2  Climbing 3-5 steps with a railing?: 1  Total Score: 11    AM-PAC Raw Score CMS G-Code Modifier Level of Impairment Assistance   6 % Total / Unable   7 - 9 CM 80 - 100% Maximal Assist   10 - 14 CL 60 - 80% Moderate Assist   15 - 19 CK 40 - 60% Moderate Assist   20 - 22 CJ 20 - 40% Minimal Assist   23 CI 1-20% SBA / CGA   24 CH 0% Independent/ Mod I     Patient left HOB elevated with all lines intact, call button in reach and RN notified.    Assessment:  Emmett Perez is a 84 y.o. 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 Pt was agreeable to participate ,however very sleepy throughout session .Continued VC and TC for patient to remain alert throughout session. Able to respond to verbal commands and answer questions but needed increased time for response. Pt not appropriate for GT this session 2* increased lethargy, Patient will benefit from skilled PT to address deficits and increase function.    Rehab identified problem list/impairments: Rehab identified problem list/impairments: weakness, impaired endurance, impaired self care skills, impaired functional mobilty, impaired balance, decreased coordination, decreased upper extremity function, decreased lower extremity function, decreased safety awareness    Rehab potential is good.    Activity tolerance: Good    Discharge recommendations: Discharge Facility/Level Of  Care Needs: rehabilitation facility     Barriers to discharge: Barriers to Discharge: Inaccessible home environment, Decreased caregiver support    Equipment recommendations:       GOALS:    Physical Therapy Goals        Problem: Physical Therapy Goal    Goal Priority Disciplines Outcome Goal Variances Interventions   Physical Therapy Goal     PT/OT, PT Ongoing (interventions implemented as appropriate)     Description:  PT goals until 6/30/17    1. Pt supine to sit with SBA-not met  2. Pt sit to supine with SBA-not met  3. Pt sit to stand with RW with CGA-not met  4. Pt to perform gait 100ft with RW with CGA.-not met  5. Pt to transfer bed to/from bedside chair with CGA.-not met  6. Pt to up/down 2 steps with no rail with minimal assist.-not met  7. Pt to perform B LE exs in sitting or supine x 15 reps to increase LE strength to improve functional mobility.-not met                      PLAN:    Patient to be seen 6 x/week  to address the above listed problems via gait training, therapeutic activities, therapeutic exercises, neuromuscular re-education  Plan of Care expires: 07/22/17  Plan of Care reviewed with: patient         TON Napoles  06/24/2017

## 2017-06-24 NOTE — PLAN OF CARE
Problem: Physical Therapy Goal  Goal: Physical Therapy Goal  PT goals until 6/30/17    1. Pt supine to sit with SBA-not met  2. Pt sit to supine with SBA-not met  3. Pt sit to stand with RW with CGA-not met  4. Pt to perform gait 100ft with RW with CGA.-not met  5. Pt to transfer bed to/from bedside chair with CGA.-not met  6. Pt to up/down 2 steps with no rail with minimal assist.-not met  7. Pt to perform B LE exs in sitting or supine x 15 reps to increase LE strength to improve functional mobility.-not met     Outcome: Ongoing (interventions implemented as appropriate)      Pt goals remain appropriate   Meghna Brownlee SPTA  June 24,2017

## 2017-06-25 PROBLEM — G93.40 ENCEPHALOPATHY: Status: ACTIVE | Noted: 2017-06-25

## 2017-06-25 LAB
ALBUMIN SERPL BCP-MCNC: 3.3 G/DL
ALP SERPL-CCNC: 167 U/L
ALT SERPL W/O P-5'-P-CCNC: 17 U/L
ANION GAP SERPL CALC-SCNC: 8 MMOL/L
AST SERPL-CCNC: 21 U/L
BASOPHILS # BLD AUTO: 0.01 K/UL
BASOPHILS NFR BLD: 0.1 %
BILIRUB SERPL-MCNC: 1.6 MG/DL
BILIRUB UR QL STRIP: NEGATIVE
BUN SERPL-MCNC: 33 MG/DL
CALCIUM SERPL-MCNC: 9 MG/DL
CHLORIDE SERPL-SCNC: 106 MMOL/L
CK MB SERPL-MCNC: 3.1 NG/ML
CK MB SERPL-RTO: 1.9 %
CK SERPL-CCNC: 160 U/L
CLARITY UR REFRACT.AUTO: CLEAR
CO2 SERPL-SCNC: 24 MMOL/L
COLOR UR AUTO: YELLOW
CREAT SERPL-MCNC: 1.4 MG/DL
DIFFERENTIAL METHOD: ABNORMAL
EOSINOPHIL # BLD AUTO: 0.1 K/UL
EOSINOPHIL NFR BLD: 1.1 %
ERYTHROCYTE [DISTWIDTH] IN BLOOD BY AUTOMATED COUNT: 12.5 %
EST. GFR  (AFRICAN AMERICAN): 53 ML/MIN/1.73 M^2
EST. GFR  (NON AFRICAN AMERICAN): 45.8 ML/MIN/1.73 M^2
GLUCOSE SERPL-MCNC: 186 MG/DL
GLUCOSE UR QL STRIP: ABNORMAL
HCT VFR BLD AUTO: 42.2 %
HGB BLD-MCNC: 14.5 G/DL
HGB UR QL STRIP: NEGATIVE
KETONES UR QL STRIP: NEGATIVE
LACTATE SERPL-SCNC: 1.2 MMOL/L
LEUKOCYTE ESTERASE UR QL STRIP: NEGATIVE
LYMPHOCYTES # BLD AUTO: 2 K/UL
LYMPHOCYTES NFR BLD: 28.7 %
MCH RBC QN AUTO: 27.5 PG
MCHC RBC AUTO-ENTMCNC: 34.4 %
MCV RBC AUTO: 80 FL
MONOCYTES # BLD AUTO: 0.4 K/UL
MONOCYTES NFR BLD: 5.7 %
NEUTROPHILS # BLD AUTO: 4.5 K/UL
NEUTROPHILS NFR BLD: 64.1 %
NITRITE UR QL STRIP: NEGATIVE
PH UR STRIP: 5 [PH] (ref 5–8)
PLATELET # BLD AUTO: 240 K/UL
PMV BLD AUTO: 11.1 FL
POCT GLUCOSE: 150 MG/DL (ref 70–110)
POCT GLUCOSE: 199 MG/DL (ref 70–110)
POCT GLUCOSE: 310 MG/DL (ref 70–110)
POTASSIUM SERPL-SCNC: 4.2 MMOL/L
PROT SERPL-MCNC: 6.9 G/DL
PROT UR QL STRIP: NEGATIVE
RBC # BLD AUTO: 5.27 M/UL
SODIUM SERPL-SCNC: 138 MMOL/L
SP GR UR STRIP: 1.01 (ref 1–1.03)
TROPONIN I SERPL DL<=0.01 NG/ML-MCNC: <0.006 NG/ML
URN SPEC COLLECT METH UR: ABNORMAL
UROBILINOGEN UR STRIP-ACNC: NEGATIVE EU/DL
WBC # BLD AUTO: 7 K/UL

## 2017-06-25 PROCEDURE — 93005 ELECTROCARDIOGRAM TRACING: CPT

## 2017-06-25 PROCEDURE — 63600175 PHARM REV CODE 636 W HCPCS: Performed by: NURSE PRACTITIONER

## 2017-06-25 PROCEDURE — 80053 COMPREHEN METABOLIC PANEL: CPT

## 2017-06-25 PROCEDURE — 36600 WITHDRAWAL OF ARTERIAL BLOOD: CPT

## 2017-06-25 PROCEDURE — 20600001 HC STEP DOWN PRIVATE ROOM

## 2017-06-25 PROCEDURE — 97535 SELF CARE MNGMENT TRAINING: CPT

## 2017-06-25 PROCEDURE — 99233 SBSQ HOSP IP/OBS HIGH 50: CPT | Mod: GC,,, | Performed by: PSYCHIATRY & NEUROLOGY

## 2017-06-25 PROCEDURE — 81003 URINALYSIS AUTO W/O SCOPE: CPT

## 2017-06-25 PROCEDURE — 82553 CREATINE MB FRACTION: CPT

## 2017-06-25 PROCEDURE — 63600175 PHARM REV CODE 636 W HCPCS: Performed by: PSYCHIATRY & NEUROLOGY

## 2017-06-25 PROCEDURE — 97110 THERAPEUTIC EXERCISES: CPT

## 2017-06-25 PROCEDURE — 36415 COLL VENOUS BLD VENIPUNCTURE: CPT

## 2017-06-25 PROCEDURE — 85025 COMPLETE CBC W/AUTO DIFF WBC: CPT

## 2017-06-25 PROCEDURE — 25000003 PHARM REV CODE 250: Performed by: PHYSICIAN ASSISTANT

## 2017-06-25 PROCEDURE — 25000003 PHARM REV CODE 250: Performed by: PSYCHIATRY & NEUROLOGY

## 2017-06-25 PROCEDURE — 93010 ELECTROCARDIOGRAM REPORT: CPT | Mod: S$GLB,,, | Performed by: INTERNAL MEDICINE

## 2017-06-25 PROCEDURE — 25000003 PHARM REV CODE 250: Performed by: NURSE PRACTITIONER

## 2017-06-25 PROCEDURE — 25000003 PHARM REV CODE 250: Performed by: HOSPITALIST

## 2017-06-25 PROCEDURE — 83605 ASSAY OF LACTIC ACID: CPT

## 2017-06-25 PROCEDURE — 99900035 HC TECH TIME PER 15 MIN (STAT)

## 2017-06-25 PROCEDURE — 84484 ASSAY OF TROPONIN QUANT: CPT

## 2017-06-25 RX ORDER — BENZONATATE 100 MG/1
100 CAPSULE ORAL 3 TIMES DAILY PRN
Status: DISCONTINUED | OUTPATIENT
Start: 2017-06-25 | End: 2017-06-26

## 2017-06-25 RX ADMIN — MEMANTINE HYDROCHLORIDE 10 MG: 10 TABLET, FILM COATED ORAL at 10:06

## 2017-06-25 RX ADMIN — ATORVASTATIN CALCIUM 40 MG: 20 TABLET, FILM COATED ORAL at 10:06

## 2017-06-25 RX ADMIN — Medication 3 ML: at 10:06

## 2017-06-25 RX ADMIN — INSULIN DETEMIR 4 UNITS: 100 INJECTION, SOLUTION SUBCUTANEOUS at 10:06

## 2017-06-25 RX ADMIN — Medication 3 ML: at 06:06

## 2017-06-25 RX ADMIN — ENOXAPARIN SODIUM 40 MG: 100 INJECTION SUBCUTANEOUS at 04:06

## 2017-06-25 RX ADMIN — BENZONATATE 100 MG: 100 CAPSULE ORAL at 04:06

## 2017-06-25 RX ADMIN — SODIUM CHLORIDE 1000 ML: 0.9 INJECTION, SOLUTION INTRAVENOUS at 08:06

## 2017-06-25 RX ADMIN — CLOPIDOGREL 75 MG: 75 TABLET, FILM COATED ORAL at 10:06

## 2017-06-25 RX ADMIN — DONEPEZIL HYDROCHLORIDE 10 MG: 5 TABLET, FILM COATED ORAL at 10:06

## 2017-06-25 RX ADMIN — INSULIN ASPART 2 UNITS: 100 INJECTION, SOLUTION INTRAVENOUS; SUBCUTANEOUS at 10:06

## 2017-06-25 RX ADMIN — AMLODIPINE BESYLATE 10 MG: 10 TABLET ORAL at 10:06

## 2017-06-25 RX ADMIN — LISINOPRIL 20 MG: 20 TABLET ORAL at 10:06

## 2017-06-25 NOTE — PLAN OF CARE
Problem: Occupational Therapy Goal  Goal: Occupational Therapy Goal  Goals to be met by 7/6/2017:    1.  Sit eob with CG A for dynamic sitting balance during grooming tasks  2.  CG A to complete grooming tasks in supported sitting  3.  Complete UB dressing with min a  4.  Don shorts/underpants or pants with min a  5.  Toilet self with min a  6.  Toilet or BSC transfer with min a   Outcome: Ongoing (interventions implemented as appropriate)  Goals remain appropriate, continue with OT POC.

## 2017-06-25 NOTE — PLAN OF CARE
Problem: Patient Care Overview  Goal: Plan of Care Review  Plan of care reviewed with patient and family.  Verbalized understanding. Patient was very sleepy this morning.  Advised stroke team.  Patient received 1000 ml of NS bolus. Patient was able to wake up and eat.VSS. Resting comfortably in bed.  Will continue to monitor.

## 2017-06-25 NOTE — SUBJECTIVE & OBJECTIVE
Neurologic Chief Complaint: left sided weakness    Subjective:     Interval History: Patient is seen for follow-up neurological assessment and treatment recommendations:     No acute events over night. Family at bedside noted that pt was a bit sleepier and a little bit more difficult to arouse. He was not orienting to time or place. He does have underlying dementia. Evaluated at bedside. He was easily arousable, and his mental status improved at a later evaluation. He was orienting to self and family, but not to place or time. Per discussion with the team, these symptoms were present on admission and do not appear to be an acute change in mental status. Neuro exam stable with no neuro deficits. Labs and vital signs stable. EKG and trop unremarkable.     HPI, Past Medical, Family, and Social History remains the same as documented in the initial encounter.     Review of Systems   Constitutional: Negative for activity change, appetite change, fatigue and fever.   HENT: Negative for sore throat.    Eyes: Negative for visual disturbance.   Respiratory: Negative for cough, chest tightness and wheezing.    Cardiovascular: Negative for chest pain and leg swelling.   Gastrointestinal: Negative for abdominal distention and abdominal pain.   Genitourinary: Negative for dysuria.   Musculoskeletal: Negative for arthralgias.   Neurological: Positive for weakness. Negative for dizziness, syncope, light-headedness and headaches.   Psychiatric/Behavioral: Positive for confusion.     Scheduled Meds:   amlodipine  10 mg Oral Daily    atorvastatin  40 mg Oral Daily    clopidogrel  75 mg Oral Daily    donepezil  10 mg Oral Daily    enoxaparin  40 mg Subcutaneous Daily    insulin detemir  4 Units Subcutaneous Daily    lisinopril  20 mg Oral Daily    memantine  10 mg Oral BID    metoprolol succinate  25 mg Oral Daily    polyethylene glycol  17 g Oral Once    senna-docusate 8.6-50 mg  1 tablet Oral BID    sodium chloride 0.9%   3 mL Intravenous Q8H     Continuous Infusions:   sodium chloride 0.9%       PRN Meds:acetaminophen, dextrose 50%, dextrose 50%, glucagon (human recombinant), glucose, glucose, insulin aspart, labetalol, ondansetron, polyethylene glycol, polyethylene glycol, sodium chloride 0.9%    Objective:     Vital Signs (Most Recent):  Temp: 96.8 °F (36 °C) (06/25/17 1201)  Pulse: 88 (06/25/17 1201)  Resp: 17 (06/25/17 1201)  BP: 136/61 (06/25/17 1201)  SpO2: (!) 90 % (06/25/17 1201)  BP Location: Left arm    Vital Signs Range (Last 24H):  Temp:  [96.5 °F (35.8 °C)-98.5 °F (36.9 °C)]   Pulse:  [57-88]   Resp:  [17-18]   BP: (116-194)/(61-90)   SpO2:  [90 %-98 %]   BP Location: Left arm    Physical Exam   Constitutional: He appears well-developed and well-nourished. No distress.   HENT:   Head: Normocephalic and atraumatic.   Eyes: Pupils are equal, round, and reactive to light.   Neck: Normal range of motion. Neck supple.   Cardiovascular: Normal rate, regular rhythm and normal heart sounds.    No murmur heard.  Pulmonary/Chest: Effort normal and breath sounds normal. No respiratory distress. He has no wheezes.   Abdominal: Soft. Bowel sounds are normal. He exhibits no distension. There is no tenderness.   Musculoskeletal: Normal range of motion. He exhibits no tenderness.   Neurological: He is alert. A cranial nerve deficit is present.   Skin: Skin is warm and dry. He is not diaphoretic.       Neurological Exam:   LOC: alert and follows requests  Language: No aphasia  Speech: Dysarthria  Orientation: Person, Not oriented to time  Visual Fields (CN II): Full  EOM (CN III, IV, VI): Full/intact  Pupils (CN III, IV, VI): PERRL  Facial Sensation (CN V): Symmetric  Facial Movement (CN VII): lower weakness left lower  Shoulder/Neck (CN XI): SCM-Left: Normal ; SCM-Right: Normal ; Shoulder Shrug: Normal/Symetric  Tongue (CN XII): to midline  Motor*: Arm Left:  Paretic:  4/5, Leg Left:   Paretic:  3/5, Arm Right:   Normal (5/5), Leg  Right:   Normal (5/5)  Cerebellar*: Normal limb  Sensation: intact to light touch, temperature and vibration  Tone: Arm-Left: normal; Leg-Left: normal; Arm-Right: normal; Leg-Right: normal      NIH Stroke Scale:    Level of Consciousness: 0 - alert  LOC Questions: 1 - answers one correctly (at cognitive baseline from dementia)  LOC Commands: 0 - performs both correctly  Best Gaze: 0 - normal  Visual: 0 - no visual loss  Facial Palsy: 2 - partial  Motor Left Arm: 2 - can't resist gravity  Motor Right Arm: 0 - no drift  Motor Left Le - drift  Motor Right Le - no drift  Limb Ataxia: 0 - absent  Sensory: 0 - normal  Best Language: 0 - no aphasia  Dysarthria: 0 - normal articulation  Extinction and Inattention: 0 - no neglect  NIH Stroke Scale Total: 6      Laboratory:  CMP:   Recent Labs  Lab 17  0805   CALCIUM 9.0   ALBUMIN 3.3*   PROT 6.9      K 4.2   CO2 24      BUN 33*   CREATININE 1.4   ALKPHOS 167*   ALT 17   AST 21   BILITOT 1.6*     CBC:   Recent Labs  Lab 17  0805   WBC 7.00   RBC 5.27   HGB 14.5   HCT 42.2      MCV 80*   MCH 27.5   MCHC 34.4

## 2017-06-25 NOTE — PROGRESS NOTES
Called to bedside for abg. Proceeded to stick pt but was unsuccessful in getting blood. md came to bedside and said it was not necessary to stick pt again. abg was no longer needed.

## 2017-06-25 NOTE — ASSESSMENT & PLAN NOTE
84 yr old male with PMHx of HTN, DMII, HLD, dementia, CKD presenting with left hemiparesis. Imaging positive for right thalamocapsular infarct; lacunar infarct. Likely etiology of stroke is small vessel ischemic disease.    Antithrombotics for secondary stroke prevention: Antiplatelets:  Clopidogrel 75mg qd  Atorvastatin- 40 mg oral daily  VTE Prophylaxis: lovenox 40mg qd\  Rehab candidate

## 2017-06-25 NOTE — ASSESSMENT & PLAN NOTE
- likely due to underlying dementia, possibly hospital acquired delirium   - labs are unremarkable overall  - check UA  - delirium precautions  - cont to monitor

## 2017-06-25 NOTE — PT/OT/SLP PROGRESS
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  Pt agreeable to therapy.  "I am just so sleepy today."  Pain/Comfort  Pain Rating 1: 0/10    Objective:  Patient found with: telemetry     Functional Mobility:  Bed Mobility:  Rolling/Turning to Left: Moderate assistance  Rolling/Turning Right: Moderate assistance  Scooting/Bridging: Maximum Assistance (max A for scooting up in bed laying supine, min A for scooting to EOB)  Supine to Sit: Maximum Assistance  Sit to Supine: Maximum Assistance    Transfers:   Sit <> Stand Assistance: Moderate Assistance  Sit <> Stand Assistive Device: Rolling Walker    Functional Ambulation: DNT    Activities of Daily Living:  LE Dressing Level of Assistance: Moderate assistance, Maximum assistance (for donning shorts using LHR and L UE in WB position. Pt unable to maintain balance or cross L leg over R . Max A for donning/doffing socks)  Toileting Where Assessed: Bed level (urinal)  Toileting Level of Assistance: Total assistance (for placing urinal and for emptying urinal )     Balance:   Static Sit: 0: Needs MAXIMAL assist to maintain sitting without back support At time requires CGA for sitting balance  Dynamic Sit: 0: N/A  Static Stand: 0: Needs MAXIMAL assist to maintain   Dynamic stand: 0: N/A    Therapeutic Activities and Exercises:  Pt sat EOB for ~20 minutes while engaging in AAROM of L UE 1x10 all available planes.  Pt encouraged to keep eyes open and head " "facing forward to strengthen cervical extensors. Pt ofter resumed resting with head down and soulders rounded. He was able to maintain sitting balance during AAROM exercises.     Pt removed socks with max A requiring total assist to recover balance at times. His L arm was placed in WB position with good joint alignment. Introduced long handled reacher and provided education on david dressing techniques. Pt performed task with good improvement but continued to required max A at times for balance. Pt stood once with use of RW but unable to maintain standing position for more than ~15 seconds. No knee buckling noted but more of a gradual lowering back to seated position. Pt required max A x 2 to scoot up towards HOB.     AM-PAC 6 CLICK ADL   How much help from another person does this patient currently need?   1 = Unable, Total/Dependent Assistance  2 = A lot, Maximum/Moderate Assistance  3 = A little, Minimum/Contact Guard/Supervision  4 = None, Modified [place]/Independent    Putting on and taking off regular lower body clothing? : 2  Bathing (including washing, rinsing, drying)?: 2  Toileting, which includes using toilet, bedpan, or urinal? : 1  Putting on and taking off regular upper body clothing?: 2  Taking care of personal grooming such as brushing teeth?: 3  Eating meals?: 3  Total Score: 13     AM-PAC Raw Score CMS "G-Code Modifier Level of Impairment Assistance   6 % Total / Unable   7 - 8 CM 80 - 100% Maximal Assist   9-13 CL 60 - 80% Moderate Assist   14 - 19 CK 40 - 60% Moderate Assist   20 - 22 CJ 20 - 40% Minimal Assist   23 CI 1-20% SBA / CGA   24 CH 0% Independent/ Mod I       Patient left supine with call button in reach    ASSESSMENT:  Emmett Perez is a 84 y.o. 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 Pt would benefit from skilled OT services in order to maximize independence " with ADLs and facilitate safe discharge.    Rehab identified problem list/impairments: Rehab identified problem list/impairments: weakness, impaired endurance, impaired sensation, impaired self care skills, impaired functional mobilty, decreased upper extremity function, decreased lower extremity function, decreased coordination    Rehab potential is good.    Activity tolerance: Good    Discharge recommendations: Discharge Facility/Level Of Care Needs: rehabilitation facility     Barriers to discharge: Barriers to Discharge: Inaccessible home environment, Decreased caregiver support    Equipment recommendations: bedside commode, wheelchair     GOALS:    Occupational Therapy Goals        Problem: Occupational Therapy Goal    Goal Priority Disciplines Outcome Interventions   Occupational Therapy Goal     OT, PT/OT Ongoing (interventions implemented as appropriate)    Description:  Goals to be met by 7/6/2017:    1.  Sit eob with CG A for dynamic sitting balance during grooming tasks  2.  CG A to complete grooming tasks in supported sitting  3.  Complete UB dressing with min a  4.  Don shorts/underpants or pants with min a  5.  Toilet self with min a  6.  Toilet or BSC transfer with min a                    Plan:  Patient to be seen 6 x/week to address the above listed problems via self-care/home management, therapeutic exercises, therapeutic activities, neuromuscular re-education  Plan of Care expires: 07/21/17  Plan of Care reviewed with: patient, family      AIXA Morelos  06/25/2017

## 2017-06-25 NOTE — PROGRESS NOTES
[facility]  Vascular Neurology  Comprehensive Stroke Center  Progress Note    Assessment/Plan:     83 y/o male with left sided weakness and facial droop. Stroke W/U in progress. Not TPA candidate as out of window, not IR as minimal symptoms.     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 Imaging positive for right thalamocapsular infarct; lacunar infarct. Likely etiology of stroke is small vessel ischemic disease.    Antithrombotics for secondary stroke prevention: Antiplatelets:  Clopidogrel 75mg qd  Atorvastatin- 40 mg oral daily  VTE Prophylaxis: lovenox 40mg qd\  Rehab candidate         Cytotoxic cerebral edema    2/2 stroke  Noted on imaging        Cerebral microvascular disease    Likely etiology of stroke  - plan as above        Facial droop    2/2 stroke        Hemiparesis, left    2/2 stroke  PT/OT recommending rehab        Combined systolic and diastolic cardiac dysfunction    - ECHO with 35% EF; +diastolic dysfunction  - lisinopril 20mg qd  - toprol 25 mg daily        Essential hypertension    Stroke risk factor  SBP <160  Lisinopril 20 dialy, amlodipine 10mg, toprol 25 mg        Type 2 diabetes mellitus with diabetic polyneuropathy, with long-term current use of insulin    Stroke risk factor  - uncontrolled  HgBA1c 9.8  - detemir 4 unit qam  SSI        Chronic kidney disease, stage III (moderate)    Baseline Cr 1.3-1.9        Encephalopathy    - likely due to underlying dementia, possibly hospital acquired delirium   - labs are unremarkable overall  - check UA  - delirium precautions  - cont to monitor        Alzheimer's dementia without behavioral disturbance    - cont donepezil and memantine          Dispo: rehab    Jessie Hair MD  Internal Medicine PGY-2  Pager [phone]            Neurologic Chief Complaint: left sided weakness    Subjective:     Interval History:  Patient is seen for follow-up neurological assessment and treatment recommendations:     No acute events over night. Family at bedside noted that pt was a bit sleepier and a little bit more difficult to arouse. He was not orienting to time or place. He does have underlying dementia. Evaluated at bedside. He was easily arousable, and his mental status improved at a later evaluation. He was orienting to self and family, but not to place or time. Per discussion with the team, these symptoms were present on admission and do not appear to be an acute change in mental status. Neuro exam stable with no neuro deficits. Labs and vital signs stable. EKG and trop unremarkable.     HPI, Past Medical, Family, and Social History remains the same as documented in the initial encounter.     Review of Systems   Constitutional: Negative for activity change, appetite change, fatigue and fever.   HENT: Negative for sore throat.    Eyes: Negative for visual disturbance.   Respiratory: Negative for cough, chest tightness and wheezing.    Cardiovascular: Negative for chest pain and leg swelling.   Gastrointestinal: Negative for abdominal distention and abdominal pain.   Genitourinary: Negative for dysuria.   Musculoskeletal: Negative for arthralgias.   Neurological: Positive for weakness. Negative for dizziness, syncope, light-headedness and headaches.   Psychiatric/Behavioral: Positive for confusion.     Scheduled Meds:   amlodipine  10 mg Oral Daily    atorvastatin  40 mg Oral Daily    clopidogrel  75 mg Oral Daily    donepezil  10 mg Oral Daily    enoxaparin  40 mg Subcutaneous Daily    insulin detemir  4 Units Subcutaneous Daily    lisinopril  20 mg Oral Daily    memantine  10 mg Oral BID    metoprolol succinate  25 mg Oral Daily    polyethylene glycol  17 g Oral Once    senna-docusate 8.6-50 mg  1 tablet Oral BID    sodium chloride 0.9%  3 mL Intravenous Q8H     Continuous Infusions:   sodium chloride 0.9%       PRN  Meds:acetaminophen, dextrose 50%, dextrose 50%, glucagon (human recombinant), glucose, glucose, insulin aspart, labetalol, ondansetron, polyethylene glycol, polyethylene glycol, sodium chloride 0.9%    Objective:     Vital Signs (Most Recent):  Temp: 96.8 °F (36 °C) (06/25/17 1201)  Pulse: 88 (06/25/17 1201)  Resp: 17 (06/25/17 1201)  BP: 136/61 (06/25/17 1201)  SpO2: (!) 90 % (06/25/17 1201)  BP Location: Left arm    Vital Signs Range (Last 24H):  Temp:  [96.5 °F (35.8 °C)-98.5 °F (36.9 °C)]   Pulse:  [57-88]   Resp:  [17-18]   BP: (116-194)/(61-90)   SpO2:  [90 %-98 %]   BP Location: Left arm    Physical Exam   Constitutional: He appears well-developed and well-nourished. No distress.   HENT:   Head: Normocephalic and atraumatic.   Eyes: Pupils are equal, round, and reactive to light.   Neck: Normal range of motion. Neck supple.   Cardiovascular: Normal rate, regular rhythm and normal heart sounds.    No murmur heard.  Pulmonary/Chest: Effort normal and breath sounds normal. No respiratory distress. He has no wheezes.   Abdominal: Soft. Bowel sounds are normal. He exhibits no distension. There is no tenderness.   Musculoskeletal: Normal range of motion. He exhibits no tenderness.   Neurological: He is alert. A cranial nerve deficit is present.   Skin: Skin is warm and dry. He is not diaphoretic.       Neurological Exam:   LOC: alert and follows requests  Language: No aphasia  Speech: Dysarthria  Orientation: Person, Not oriented to time  Visual Fields (CN II): Full  EOM (CN III, IV, VI): Full/intact  Pupils (CN III, IV, VI): PERRL  Facial Sensation (CN V): Symmetric  Facial Movement (CN VII): lower weakness left lower  Shoulder/Neck (CN XI): SCM-Left: Normal ; SCM-Right: Normal ; Shoulder Shrug: Normal/Symetric  Tongue (CN XII): to midline  Motor*: Arm Left:  Paretic:  4/5, Leg Left:   Paretic:  3/5, Arm Right:   Normal (5/5), Leg Right:   Normal (5/5)  Cerebellar*: Normal limb  Sensation: intact to light touch,  temperature and vibration  Tone: Arm-Left: normal; Leg-Left: normal; Arm-Right: normal; Leg-Right: normal      NIH Stroke Scale:    Level of Consciousness: 0 - alert  LOC Questions: 1 - answers one correctly (at cognitive baseline from dementia)  LOC Commands: 0 - performs both correctly  Best Gaze: 0 - normal  Visual: 0 - no visual loss  Facial Palsy: 2 - partial  Motor Left Arm: 2 - can't resist gravity  Motor Right Arm: 0 - no drift  Motor Left Le - drift  Motor Right Le - no drift  Limb Ataxia: 0 - absent  Sensory: 0 - normal  Best Language: 0 - no aphasia  Dysarthria: 0 - normal articulation  Extinction and Inattention: 0 - no neglect  NIH Stroke Scale Total: 6      Laboratory:  CMP:   Recent Labs  Lab 17  0805   CALCIUM 9.0   ALBUMIN 3.3*   PROT 6.9      K 4.2   CO2 24      BUN 33*   CREATININE 1.4   ALKPHOS 167*   ALT 17   AST 21   BILITOT 1.6*     CBC:   Recent Labs  Lab 17  0805   WBC 7.00   RBC 5.27   HGB 14.5   HCT 42.2      MCV 80*   MCH 27.5   MCHC 34.4           Jessie Hair MD  Comprehensive Stroke Center  Department of Vascular Neurology   [facility]

## 2017-06-25 NOTE — PLAN OF CARE
Problem: Patient Care Overview  Goal: Individualization & Mutuality  Outcome: Ongoing (interventions implemented as appropriate)  POC reviewed with patient,spouse and daughter. Stroke education booklet provided. Pt has remained free from falls/injury falls tonight.Bed alarm for safety. No new skin impairments noted-skin intact. Afebrile. Safety precautions maintained. Continuous telemetry monitoring in progressNSR/SB overnight. No signs of hypoglycemia. Turn schedule maintained Q2 hours for pressure relief. No worsening neuro changes noted. REYES on command/+ for drift to LUE's and BLE's. SCD's in use. No issues with pain. Spouse to bedside overnight.

## 2017-06-26 LAB
ANION GAP SERPL CALC-SCNC: 8 MMOL/L
BASOPHILS # BLD AUTO: 0.01 K/UL
BASOPHILS NFR BLD: 0.1 %
BUN SERPL-MCNC: 27 MG/DL
CALCIUM SERPL-MCNC: 9 MG/DL
CHLORIDE SERPL-SCNC: 105 MMOL/L
CO2 SERPL-SCNC: 24 MMOL/L
CREAT SERPL-MCNC: 1.3 MG/DL
DIFFERENTIAL METHOD: ABNORMAL
EOSINOPHIL # BLD AUTO: 0.1 K/UL
EOSINOPHIL NFR BLD: 1.5 %
ERYTHROCYTE [DISTWIDTH] IN BLOOD BY AUTOMATED COUNT: 12.5 %
EST. GFR  (AFRICAN AMERICAN): 57.9 ML/MIN/1.73 M^2
EST. GFR  (NON AFRICAN AMERICAN): 50.1 ML/MIN/1.73 M^2
GLUCOSE SERPL-MCNC: 232 MG/DL
HCT VFR BLD AUTO: 41.6 %
HGB BLD-MCNC: 14.3 G/DL
LYMPHOCYTES # BLD AUTO: 2.2 K/UL
LYMPHOCYTES NFR BLD: 29.1 %
MAGNESIUM SERPL-MCNC: 1.7 MG/DL
MCH RBC QN AUTO: 27.4 PG
MCHC RBC AUTO-ENTMCNC: 34.4 %
MCV RBC AUTO: 80 FL
MONOCYTES # BLD AUTO: 0.5 K/UL
MONOCYTES NFR BLD: 6.3 %
NEUTROPHILS # BLD AUTO: 4.7 K/UL
NEUTROPHILS NFR BLD: 62.7 %
PHOSPHATE SERPL-MCNC: 3 MG/DL
PLATELET # BLD AUTO: 232 K/UL
PMV BLD AUTO: 10.9 FL
POCT GLUCOSE: 195 MG/DL (ref 70–110)
POCT GLUCOSE: 196 MG/DL (ref 70–110)
POCT GLUCOSE: 233 MG/DL (ref 70–110)
POCT GLUCOSE: 269 MG/DL (ref 70–110)
POTASSIUM SERPL-SCNC: 3.9 MMOL/L
RBC # BLD AUTO: 5.21 M/UL
SODIUM SERPL-SCNC: 137 MMOL/L
WBC # BLD AUTO: 7.49 K/UL

## 2017-06-26 PROCEDURE — 97110 THERAPEUTIC EXERCISES: CPT

## 2017-06-26 PROCEDURE — 99232 SBSQ HOSP IP/OBS MODERATE 35: CPT | Mod: ,,, | Performed by: NURSE PRACTITIONER

## 2017-06-26 PROCEDURE — 94799 UNLISTED PULMONARY SVC/PX: CPT

## 2017-06-26 PROCEDURE — 20600001 HC STEP DOWN PRIVATE ROOM

## 2017-06-26 PROCEDURE — 63600175 PHARM REV CODE 636 W HCPCS: Performed by: NURSE PRACTITIONER

## 2017-06-26 PROCEDURE — 25000242 PHARM REV CODE 250 ALT 637 W/ HCPCS: Performed by: PSYCHIATRY & NEUROLOGY

## 2017-06-26 PROCEDURE — 84100 ASSAY OF PHOSPHORUS: CPT

## 2017-06-26 PROCEDURE — 25000003 PHARM REV CODE 250: Performed by: NURSE PRACTITIONER

## 2017-06-26 PROCEDURE — 85025 COMPLETE CBC W/AUTO DIFF WBC: CPT

## 2017-06-26 PROCEDURE — 80048 BASIC METABOLIC PNL TOTAL CA: CPT

## 2017-06-26 PROCEDURE — 92526 ORAL FUNCTION THERAPY: CPT

## 2017-06-26 PROCEDURE — 94761 N-INVAS EAR/PLS OXIMETRY MLT: CPT

## 2017-06-26 PROCEDURE — 94664 DEMO&/EVAL PT USE INHALER: CPT

## 2017-06-26 PROCEDURE — 27000221 HC OXYGEN, UP TO 24 HOURS

## 2017-06-26 PROCEDURE — 94640 AIRWAY INHALATION TREATMENT: CPT

## 2017-06-26 PROCEDURE — 97535 SELF CARE MNGMENT TRAINING: CPT

## 2017-06-26 PROCEDURE — 99233 SBSQ HOSP IP/OBS HIGH 50: CPT | Mod: GC,,, | Performed by: PSYCHIATRY & NEUROLOGY

## 2017-06-26 PROCEDURE — 63600175 PHARM REV CODE 636 W HCPCS: Performed by: PSYCHIATRY & NEUROLOGY

## 2017-06-26 PROCEDURE — 25000003 PHARM REV CODE 250: Performed by: PSYCHIATRY & NEUROLOGY

## 2017-06-26 PROCEDURE — 83735 ASSAY OF MAGNESIUM: CPT

## 2017-06-26 PROCEDURE — 25000003 PHARM REV CODE 250: Performed by: PHYSICIAN ASSISTANT

## 2017-06-26 PROCEDURE — 36415 COLL VENOUS BLD VENIPUNCTURE: CPT

## 2017-06-26 PROCEDURE — 97530 THERAPEUTIC ACTIVITIES: CPT

## 2017-06-26 RX ORDER — MIDAZOLAM HYDROCHLORIDE 1 MG/ML
2 INJECTION INTRAMUSCULAR; INTRAVENOUS ONCE
Status: COMPLETED | OUTPATIENT
Start: 2017-06-26 | End: 2017-06-26

## 2017-06-26 RX ORDER — BENZONATATE 100 MG/1
100 CAPSULE ORAL 3 TIMES DAILY
Status: DISCONTINUED | OUTPATIENT
Start: 2017-06-26 | End: 2017-06-29 | Stop reason: HOSPADM

## 2017-06-26 RX ORDER — KETOROLAC TROMETHAMINE 15 MG/ML
15 INJECTION, SOLUTION INTRAMUSCULAR; INTRAVENOUS ONCE
Status: DISCONTINUED | OUTPATIENT
Start: 2017-06-26 | End: 2017-06-26

## 2017-06-26 RX ORDER — ACETAMINOPHEN 10 MG/ML
1000 INJECTION, SOLUTION INTRAVENOUS ONCE
Status: COMPLETED | OUTPATIENT
Start: 2017-06-26 | End: 2017-06-26

## 2017-06-26 RX ORDER — IPRATROPIUM BROMIDE AND ALBUTEROL SULFATE 2.5; .5 MG/3ML; MG/3ML
3 SOLUTION RESPIRATORY (INHALATION) EVERY 6 HOURS
Status: DISCONTINUED | OUTPATIENT
Start: 2017-06-26 | End: 2017-06-29 | Stop reason: HOSPADM

## 2017-06-26 RX ADMIN — INSULIN ASPART 2 UNITS: 100 INJECTION, SOLUTION INTRAVENOUS; SUBCUTANEOUS at 09:06

## 2017-06-26 RX ADMIN — MEMANTINE HYDROCHLORIDE 10 MG: 10 TABLET, FILM COATED ORAL at 09:06

## 2017-06-26 RX ADMIN — ACETAMINOPHEN 1000 MG: 10 INJECTION, SOLUTION INTRAVENOUS at 09:06

## 2017-06-26 RX ADMIN — IPRATROPIUM BROMIDE AND ALBUTEROL SULFATE 3 ML: .5; 3 SOLUTION RESPIRATORY (INHALATION) at 02:06

## 2017-06-26 RX ADMIN — Medication 3 ML: at 01:06

## 2017-06-26 RX ADMIN — Medication 3 ML: at 05:06

## 2017-06-26 RX ADMIN — MIDAZOLAM HYDROCHLORIDE 2 MG: 1 INJECTION, SOLUTION INTRAMUSCULAR; INTRAVENOUS at 12:06

## 2017-06-26 RX ADMIN — IPRATROPIUM BROMIDE AND ALBUTEROL SULFATE 3 ML: .5; 3 SOLUTION RESPIRATORY (INHALATION) at 07:06

## 2017-06-26 RX ADMIN — BENZONATATE 100 MG: 100 CAPSULE ORAL at 05:06

## 2017-06-26 RX ADMIN — ENOXAPARIN SODIUM 40 MG: 100 INJECTION SUBCUTANEOUS at 05:06

## 2017-06-26 RX ADMIN — SODIUM CHLORIDE, SODIUM LACTATE, POTASSIUM CHLORIDE, AND CALCIUM CHLORIDE 500 ML: .6; .31; .03; .02 INJECTION, SOLUTION INTRAVENOUS at 01:06

## 2017-06-26 RX ADMIN — DONEPEZIL HYDROCHLORIDE 10 MG: 5 TABLET, FILM COATED ORAL at 09:06

## 2017-06-26 RX ADMIN — CLOPIDOGREL 75 MG: 75 TABLET, FILM COATED ORAL at 09:06

## 2017-06-26 RX ADMIN — INSULIN ASPART 3 UNITS: 100 INJECTION, SOLUTION INTRAVENOUS; SUBCUTANEOUS at 01:06

## 2017-06-26 RX ADMIN — BENZONATATE 100 MG: 100 CAPSULE ORAL at 01:06

## 2017-06-26 RX ADMIN — ATORVASTATIN CALCIUM 40 MG: 20 TABLET, FILM COATED ORAL at 09:06

## 2017-06-26 NOTE — SUBJECTIVE & OBJECTIVE
Neurologic Chief Complaint: left sided weakness    Subjective:     Interval History: Patient is seen for follow-up neurological assessment and treatment recommendations:   No acute events overnight. Concerns that patient is lethargic and intermittently confused. This is reportedly how he has been a week prior to admission.    HPI, Past Medical, Family, and Social History remains the same as documented in the initial encounter.     Review of Systems   Constitutional: Negative for fatigue and fever.   Respiratory: Negative for shortness of breath.    Cardiovascular: Negative for chest pain.   Neurological: Positive for weakness.     Scheduled Meds:   albuterol-ipratropium 2.5mg-0.5mg/3mL  3 mL Nebulization Q6H    amlodipine  10 mg Oral Daily    atorvastatin  40 mg Oral Daily    benzonatate  100 mg Oral TID    clopidogrel  75 mg Oral Daily    donepezil  10 mg Oral Daily    enoxaparin  40 mg Subcutaneous Daily    insulin detemir  5 Units Subcutaneous Daily    lactated ringers  500 mL Intravenous Once    [START ON 6/27/2017] losartan  12.5 mg Oral Daily    memantine  10 mg Oral BID    metoprolol succinate  25 mg Oral Daily    polyethylene glycol  17 g Oral Once    senna-docusate 8.6-50 mg  1 tablet Oral BID    sodium chloride 0.9%  3 mL Intravenous Q8H     Continuous Infusions:   sodium chloride 0.9%       PRN Meds:acetaminophen, dextrose 50%, dextrose 50%, glucagon (human recombinant), glucose, glucose, insulin aspart, labetalol, ondansetron, polyethylene glycol, sodium chloride 0.9%    Objective:     Vital Signs (Most Recent):  Temp: 96.6 °F (35.9 °C) (06/26/17 1226)  Pulse: (!) 58 (06/26/17 0700)  Resp: 17 (06/26/17 1226)  BP: (!) 153/84 (06/26/17 1226)  SpO2: (!) 91 % (06/26/17 1226)  BP Location: Right arm    Vital Signs Range (Last 24H):  Temp:  [96.4 °F (35.8 °C)-97.6 °F (36.4 °C)]   Pulse:  [58-87]   Resp:  [17-18]   BP: (113-153)/(56-84)   SpO2:  [91 %-100 %]   BP Location: Right arm    Physical  Exam   Constitutional: He appears well-developed and well-nourished.   HENT:   Head: Normocephalic and atraumatic.   Eyes: Pupils are equal, round, and reactive to light.   Cardiovascular: Normal rate.        Neurological Exam:   LOC: alert and follows requests  Language: No aphasia  Speech: Dysarthria  Orientation: Person, Not oriented to time  Visual Fields (CN II): Full  EOM (CN III, IV, VI): Full/intact  Pupils (CN III, IV, VI): PERRL  Facial Sensation (CN V): Symmetric  Facial Movement (CN VII): lower weakness left lower  Shoulder/Neck (CN XI): SCM-Left: Normal ; SCM-Right: Normal ; Shoulder Shrug: Normal/Symetric  Tongue (CN XII): to midline  Motor*: Arm Left:  Paretic:  4/5, Leg Left:   Paretic:  3/5, Arm Right:   Normal (5/5), Leg Right:   Normal (5/5)  Cerebellar*: Normal limb  Sensation: intact to light touch, temperature and vibration  Tone: Arm-Left: normal; Leg-Left: normal; Arm-Right: normal; Leg-Right: normal    NIH Stroke Scale:    Level of Consciousness: 0 - alert  LOC Questions: 1 - answers one correctly (at cognitive baseline from dementia)  LOC Commands: 0 - performs both correctly  Best Gaze: 0 - normal  Visual: 0 - no visual loss  Facial Palsy: 2 - partial  Motor Left Arm: 1 - drift  Motor Right Arm: 0 - no drift  Motor Left Le - can't resist gravity  Motor Right Le - no drift  Limb Ataxia: 0 - absent  Sensory: 0 - normal  Best Language: 0 - no aphasia  Dysarthria: 1 - mild to moderate dysarthria  Extinction and Inattention: 0 - no neglect  NIH Stroke Scale Total: 7      Laboratory:  CMP:     Recent Labs  Lab 17   CALCIUM 9.0      K 3.9   CO2 24      BUN 27*   CREATININE 1.3     BMP:     Recent Labs  Lab 17      K 3.9      CO2 24   BUN 27*   CREATININE 1.3   CALCIUM 9.0     CBC:     Recent Labs  Lab 17   WBC 7.49   RBC 5.21   HGB 14.3   HCT 41.6      MCV 80*   MCH 27.4   MCHC 34.4     Lipid Panel:     Recent Labs  Lab  06/22/17  0017   CHOL 133   LDLCALC 70.6   HDL 42   TRIG 102     Coagulation:     Recent Labs  Lab 06/22/17  0440   INR 1.0   APTT 21.8     Platelet Aggregation Study: No results for input(s): PLTAGG, PLTAGINTERP, PLTAGREGLACO, ADPPLTAGGREG in the last 168 hours.  Hgb A1C:     Recent Labs  Lab 06/22/17 0017   HGBA1C 9.8*     TSH:     Recent Labs  Lab 06/22/17 0017   TSH 1.710       Diagnostic Results:  I have personally reviewed:   Findings:     MRI Brain WO contrast (6/22/17)  Right thalamic infarct. +cytotoxic cerebral edema.         CT head without contrast (6/26/17)  Right thalamic infarct.

## 2017-06-26 NOTE — PLAN OF CARE
Problem: Patient Care Overview  Goal: Plan of Care Review  Plan of care reviewed with patient and family.  Verbalized understanding.  Patient is drowsy and respond to voice. Sleeping between care. Speech recommendation to be NPO following with a barium swallow tomorrow. CT done during shift breathing treatment done.  500cc bolus of lactate R. VSS. Resting comfortably in bed.  Will continue to monitor.

## 2017-06-26 NOTE — PLAN OF CARE
06/26/17 0840   Discharge Reassessment   Assessment Type Discharge Planning Reassessment   Can the patient answer the patient profile reliably? Yes, cognitively intact   How does the patient rate their overall health at the present time? Fair   Describe the patient's ability to walk at the present time. Walks with the help of equipment   How often would a person be available to care for the patient? Whenever needed   Number of comorbid conditions (as recorded on the chart) Five or more   During the past month, has the patient often been bothered by feeling down, depressed or hopeless? Yes   During the past month, has the patient often been bothered by little interest or pleasure in doing things? Yes   Discharge plan remains the same: Yes   Provided patient/caregiver education on the expected discharge date and the discharge plan No   Discharge Plan A Rehab   Discharge Plan B Home;Home Health   Change in patient condition or support system No   Patient choice form signed by patient/caregiver N/A   Explained to the the patient/caregiver why the discharge planned changed: No   Involved the patient/caregiver in establishing a new discharge plan: No     Discharge plan: Rehab    Yenni Pearson   [id number]

## 2017-06-26 NOTE — ASSESSMENT & PLAN NOTE
- likely due to underlying dementia, possibly hospital acquired delirium   - labs are unremarkable overall; infectious workup is negative.   - delirium precautions  - cont to monitor

## 2017-06-26 NOTE — PLAN OF CARE
Problem: Patient Care Overview  Goal: Individualization & Mutuality  Outcome: Ongoing (interventions implemented as appropriate)  POC reviewed with patient, daughter and spouse tonight. Pt is confused AAO to self however was more alert tonight than previous encounter with patient. Afebrile. Follows simple commands with much prompting for task. No worsening neuro changes overnight. Staff assist warranted with bed mobility/position changes. Turn q2 hour schedule maintained. No new skin impairments noted. Safety precautions maintained. No falls/injury overnight. Required correction insulin tonight for CBG of 310. Continuous telemetry/NSR. Possible discharge today to rehab.

## 2017-06-26 NOTE — PT/OT/SLP PROGRESS
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  Pt reported that he was "surviving."  Pain/Comfort  Pain Rating 1: 0/10  Pain Rating Post-Intervention 1: 0/10    Objective:  Patient found with: telemetry     Functional Mobility:  Bed Mobility:  Scooting/Bridging:  (Max (A) with scooting to the right along EOB & forward on EOB)  Supine to Sit: Maximum Assistance  Sit to Supine: Maximum Assistance    Activities of Daily Living:     Grooming Position: Seated, EOB  Grooming Level of Assistance: Moderate assistance     Therapeutic Activities and Exercises:  Pt required Min-Max (A) with postural control while seated EOB.  Pt with noted posterior & leftward leaning while seated EOB.  Pt required verbal & physical cues to facilitate decreased leaning & increased postural control.  Pt performed reaching activities with RUE while LUE was in weight bearing position to facilitate ROM & postural control while seated EOB.  Pt with eyes closed 60% of session requiring cues to open eyes & maintain eyes open during session.  Pt performed AA/PROM with BUE in all planes x 10 reps each while supine.  Pt had no further questions & when asked whether there were any concerns pt reported none.      AM-PAC 6 CLICK ADL   How much help from another person does this patient currently need?   1 = Unable, Total/Dependent Assistance  2 = A lot, Maximum/Moderate Assistance  3 = A little, Minimum/Contact Guard/Supervision  4 = None, Modified " "[place]/Independent    Putting on and taking off regular lower body clothing? : 2  Bathing (including washing, rinsing, drying)?: 2  Toileting, which includes using toilet, bedpan, or urinal? : 1  Putting on and taking off regular upper body clothing?: 2  Taking care of personal grooming such as brushing teeth?: 2  Eating meals?: 1  Total Score: 10     AM-PAC Raw Score CMS "G-Code Modifier Level of Impairment Assistance   6 % Total / Unable   7 - 8 CM 80 - 100% Maximal Assist   9-13 CL 60 - 80% Moderate Assist   14 - 19 CK 40 - 60% Moderate Assist   20 - 22 CJ 20 - 40% Minimal Assist   23 CI 1-20% SBA / CGA   24 CH 0% Independent/ Mod I       Patient left supine with all lines intact, call button in reach, bed alarm on, Rn notified, spouse & daughter present and white board updated.    ASSESSMENT:  Emmett Perez is a 84 y.o. 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  Pt with slightly increased lethargy on this date in comparison to session on 6/23/17.  Pt continues to require (A) with all activities due in part to weakness & postural control deficits.    Rehab identified problem list/impairments: Rehab identified problem list/impairments: weakness, impaired endurance, impaired sensation, impaired self care skills, visual deficits, impaired balance, impaired functional mobilty, impaired cognition, decreased safety awareness, decreased coordination, decreased upper extremity function, decreased lower extremity function    Rehab potential is fair.    Activity tolerance: Fair    Discharge recommendations: Discharge Facility/Level Of Care Needs: rehabilitation facility     Equipment recommendations:   wheelchair, 3 in 1 commode, raised toilet seat, transfer tub bench    GOALS:    Occupational Therapy Goals        Problem: Occupational Therapy Goal    Goal Priority Disciplines Outcome Interventions "   Occupational Therapy Goal     OT, PT/OT Ongoing (interventions implemented as appropriate)    Description:  Goals to be met by 7/6/2017:    1.  Sit eob with CG A for dynamic sitting balance during grooming tasks  2.  CG A to complete grooming tasks in supported sitting  3.  Complete UB dressing with min a  4.  Don shorts/underpants or pants with min a  5.  Toilet self with min a  6.  Toilet or BSC transfer with min a                    Plan:  Patient to be seen 6 x/week to address the above listed problems via self-care/home management, cognitive retraining, sensory integration, therapeutic activities, therapeutic exercises, neuromuscular re-education  Plan of Care expires: 07/21/17  Plan of Care reviewed with: patient, daughter, spouse         AIXA Murrell  06/26/2017

## 2017-06-26 NOTE — PROGRESS NOTES
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 Imaging positive for right thalamocapsular infarct; lacunar infarct. Likely etiology of stroke is small vessel ischemic disease.    Antithrombotics for secondary stroke prevention: Antiplatelets:  Clopidogrel 75mg qd  Atorvastatin- 40 mg oral daily  VTE Prophylaxis: lovenox 40mg qd\  Rehab candidate         Encephalopathy    - likely due to underlying dementia, possibly hospital acquired delirium   - labs are unremarkable overall; infectious workup is negative.   - delirium precautions  - cont to monitor        Cytotoxic cerebral edema    2/2 stroke  Noted on imaging        Combined systolic and diastolic cardiac dysfunction    - ECHO with 35% EF; +diastolic dysfunction  - losartan  - toprol xl 25 mg daily        Cerebral microvascular disease    Likely etiology of stroke  - plan as above        Facial droop    2/2 stroke        Hemiparesis, left    2/2 stroke  PT/OT recommending rehab        Essential hypertension    Stroke risk factor  SBP <160  Losartan, amlodipine        Dementia without behavioral disturbance    - cont donepezil and memantine        Type 2 diabetes mellitus with diabetic polyneuropathy, with long-term current use of insulin    Stroke risk factor  - uncontrolled  HgBA1c 9.8  - detemir 5 unit qam  SSI        Chronic kidney disease, stage III (moderate)    Baseline Cr 1.3-1.9  stable          Dispo: SNF consult placed by CM/SW    Neurologic Chief Complaint: left sided weakness    Subjective:     Interval History: Patient is seen for follow-up neurological assessment and treatment recommendations:   No acute events overnight. Concerns that patient is lethargic and intermittently confused. This is reportedly how he has been a  week prior to admission.    HPI, Past Medical, Family, and Social History remains the same as documented in the initial encounter.     Review of Systems   Constitutional: Negative for fatigue and fever.   Respiratory: Negative for shortness of breath.    Cardiovascular: Negative for chest pain.   Neurological: Positive for weakness.     Scheduled Meds:   albuterol-ipratropium 2.5mg-0.5mg/3mL  3 mL Nebulization Q6H    amlodipine  10 mg Oral Daily    atorvastatin  40 mg Oral Daily    benzonatate  100 mg Oral TID    clopidogrel  75 mg Oral Daily    donepezil  10 mg Oral Daily    enoxaparin  40 mg Subcutaneous Daily    insulin detemir  5 Units Subcutaneous Daily    lactated ringers  500 mL Intravenous Once    [START ON 6/27/2017] losartan  12.5 mg Oral Daily    memantine  10 mg Oral BID    metoprolol succinate  25 mg Oral Daily    polyethylene glycol  17 g Oral Once    senna-docusate 8.6-50 mg  1 tablet Oral BID    sodium chloride 0.9%  3 mL Intravenous Q8H     Continuous Infusions:   sodium chloride 0.9%       PRN Meds:acetaminophen, dextrose 50%, dextrose 50%, glucagon (human recombinant), glucose, glucose, insulin aspart, labetalol, ondansetron, polyethylene glycol, sodium chloride 0.9%    Objective:     Vital Signs (Most Recent):  Temp: 96.6 °F (35.9 °C) (06/26/17 1226)  Pulse: (!) 58 (06/26/17 0700)  Resp: 17 (06/26/17 1226)  BP: (!) 153/84 (06/26/17 1226)  SpO2: (!) 91 % (06/26/17 1226)  BP Location: Right arm    Vital Signs Range (Last 24H):  Temp:  [96.4 °F (35.8 °C)-97.6 °F (36.4 °C)]   Pulse:  [58-87]   Resp:  [17-18]   BP: (113-153)/(56-84)   SpO2:  [91 %-100 %]   BP Location: Right arm    Physical Exam   Constitutional: He appears well-developed and well-nourished.   HENT:   Head: Normocephalic and atraumatic.   Eyes: Pupils are equal, round, and reactive to light.   Cardiovascular: Normal rate.        Neurological Exam:   LOC: alert and follows requests  Language: No aphasia  Speech:  Dysarthria  Orientation: Person, Not oriented to time  Visual Fields (CN II): Full  EOM (CN III, IV, VI): Full/intact  Pupils (CN III, IV, VI): PERRL  Facial Sensation (CN V): Symmetric  Facial Movement (CN VII): lower weakness left lower  Shoulder/Neck (CN XI): SCM-Left: Normal ; SCM-Right: Normal ; Shoulder Shrug: Normal/Symetric  Tongue (CN XII): to midline  Motor*: Arm Left:  Paretic:  4/5, Leg Left:   Paretic:  3/5, Arm Right:   Normal (5/5), Leg Right:   Normal (5/5)  Cerebellar*: Normal limb  Sensation: intact to light touch, temperature and vibration  Tone: Arm-Left: normal; Leg-Left: normal; Arm-Right: normal; Leg-Right: normal    NIH Stroke Scale:    Level of Consciousness: 0 - alert  LOC Questions: 1 - answers one correctly (at cognitive baseline from dementia)  LOC Commands: 0 - performs both correctly  Best Gaze: 0 - normal  Visual: 0 - no visual loss  Facial Palsy: 2 - partial  Motor Left Arm: 1 - drift  Motor Right Arm: 0 - no drift  Motor Left Le - can't resist gravity  Motor Right Le - no drift  Limb Ataxia: 0 - absent  Sensory: 0 - normal  Best Language: 0 - no aphasia  Dysarthria: 1 - mild to moderate dysarthria  Extinction and Inattention: 0 - no neglect  NIH Stroke Scale Total: 7      Laboratory:  CMP:     Recent Labs  Lab 17  042   CALCIUM 9.0      K 3.9   CO2 24      BUN 27*   CREATININE 1.3     BMP:     Recent Labs  Lab 17  042      K 3.9      CO2 24   BUN 27*   CREATININE 1.3   CALCIUM 9.0     CBC:     Recent Labs  Lab 17  042   WBC 7.49   RBC 5.21   HGB 14.3   HCT 41.6      MCV 80*   MCH 27.4   MCHC 34.4     Lipid Panel:     Recent Labs  Lab 17  0017   CHOL 133   LDLCALC 70.6   HDL 42   TRIG 102     Coagulation:     Recent Labs  Lab 17  0440   INR 1.0   APTT 21.8     Platelet Aggregation Study: No results for input(s): PLTAGG, PLTAGINTERP, PLTAGREGLACO, ADPPLTAGGREG in the last 168 hours.  Hgb A1C:     Recent Labs  Lab  06/22/17  0017   HGBA1C 9.8*     TSH:     Recent Labs  Lab 06/22/17  0017   TSH 1.710       Diagnostic Results:  I have personally reviewed:   Findings:     MRI Brain WO contrast (6/22/17)  Right thalamic infarct. +cytotoxic cerebral edema.         CT head without contrast (6/26/17)  Right thalamic infarct.          Aníbal Martinez MD  Comprehensive Stroke Center  Department of Vascular Neurology   [facility]

## 2017-06-26 NOTE — ASSESSMENT & PLAN NOTE
-  Encourage mobility, OOB in chair at least 3 hours per day, and ambulation.  -  PT/OT evaluate and treat.  Functional status: Participating with therapy.  -  SLP speech and cognitive evaluate and treat.   Cognitive/Speech/Language status:  Cognitive-linguistic impairments noted.   Nutrition/Swallow Status:  Passed bedside swallow evaluation.  Diet recommendation from SLP. Regular and thing liquids.   -  Monitor sleep disturbances and establish consistent sleep-wake cycle.  -  Monitor for bowel and bladder dysfunction.    -  Monitor for shoulder pain and subluxation.  -  Monitor for spasticity.  -  Monitor for skin breakdown and pressure ulcers (early mobility, repositioning/weight shifting every 20-30 minutes when sitting, turn patient every 2 hours, proper mattress/overlay and chair cushioning, and pressure relief/heel protector boots)  -  DVT prophylaxis:  Lovenox.  -  Reviewed discharge options with patient (inpatient rehab, SNF, home health therapy, and outpatient therapy).  Encourage participation with therapy.

## 2017-06-26 NOTE — SUBJECTIVE & OBJECTIVE
Scheduled Medications:    amlodipine  10 mg Oral Daily    atorvastatin  40 mg Oral Daily    clopidogrel  75 mg Oral Daily    donepezil  10 mg Oral Daily    enoxaparin  40 mg Subcutaneous Daily    insulin detemir  5 Units Subcutaneous Daily    losartan  12.5 mg Oral Daily    memantine  10 mg Oral BID    metoprolol succinate  25 mg Oral Daily    midazolam (PF)  2 mg Intravenous Once    polyethylene glycol  17 g Oral Once    senna-docusate 8.6-50 mg  1 tablet Oral BID    sodium chloride 0.9%  3 mL Intravenous Q8H       PRN Medications: acetaminophen, benzonatate, dextrose 50%, dextrose 50%, glucagon (human recombinant), glucose, glucose, insulin aspart, labetalol, ondansetron, polyethylene glycol, polyethylene glycol, sodium chloride 0.9%    Review of Systems   Constitutional: Positive for fatigue. Negative for chills and fever.   HENT: Negative for drooling, facial swelling, trouble swallowing and voice change.    Eyes: Negative for photophobia and visual disturbance.   Respiratory: Negative for cough, shortness of breath and wheezing.    Cardiovascular: Negative for chest pain and palpitations.   Gastrointestinal: Negative for abdominal distention, nausea and vomiting.   Genitourinary: Negative for difficulty urinating and flank pain.   Musculoskeletal: Negative for arthralgias, gait problem and myalgias.   Skin: Negative for color change and rash.   Neurological: Positive for facial asymmetry, speech difficulty and weakness. Negative for numbness.   Psychiatric/Behavioral: Positive for confusion. Negative for agitation.     Objective:     Vital Signs (Most Recent):  Temp: 97.6 °F (36.4 °C) (06/26/17 0806)  Pulse: (!) 58 (06/26/17 0700)  Resp: 18 (06/26/17 0806)  BP: (!) 113/56 (06/26/17 0806)  SpO2: (!) 94 % (06/26/17 0806)    Vital Signs (24h Range):  Temp:  [96.4 °F (35.8 °C)-97.6 °F (36.4 °C)] 97.6 °F (36.4 °C)  Pulse:  [58-88] 58  Resp:  [17-18] 18  SpO2:  [90 %-100 %] 94 %  BP: (113-149)/(56-83)  113/56     Physical Exam   Constitutional: He appears well-developed and well-nourished.   HENT:   Head: Normocephalic and atraumatic.   Eyes: EOM are normal. Pupils are equal, round, and reactive to light.   Neck: Normal range of motion.   Cardiovascular: Normal rate and regular rhythm.    Pulmonary/Chest: Effort normal. No respiratory distress.   Abdominal: Soft. There is no tenderness.   Musculoskeletal: Normal range of motion.   Neurological:   Neurologic:  -  Mental Status:  Lethargic , open eyes to voice. AOx2. Oriented to person and  only. Follows commands.  Recalls 3/3 objects.    -  Speech and language:  + aphasia,  No dysarthria.    -mild facial droop  -  Coordination:  Finger to nose exam:  RUE normal, LUE dysmetria.   -  Motor:  RUE: 5/5, 5/5 .  LUE: 4/5, 4/5 .  RLE: 5/5, DF 5/5, PF 5/5.  LLE: 4/5, DF 4/5, PF 4/5.  -  Tone:  normal  -  Sensory:  Intact to light touch and pin prick.   Skin: Skin is warm and dry.   Psychiatric: Cognition and memory are impaired.

## 2017-06-26 NOTE — PT/OT/SLP PROGRESS
Physical Therapy  Pt Not Seen     Emmett Perez  MRN: [id number]    Patient not seen today secondary to Unavailable (Comment), Other (Comment) (2 attempts for tx session: 1. RN care and about to eat breakfast at 9:09 am; 2. Transport present to take for CT scan at 10:58 am.  PTA unable to return for 3rd attempt). Will follow-up  on next scheduled visit.    Starla Estevez, PTA   6/26/2017

## 2017-06-26 NOTE — PLAN OF CARE
Problem: SLP Goal  Goal: SLP Goal  Speech Language Pathology Goals  Goals expected to be met by 6/29    1. Pt will tolerate regular diet/thin liquids without overt s/s of aspiration  2. Pt will orient to place and time with mod cues  3. Pt will complete problem solving tasks with 70% accuracy and min cues  4. Pt will follow complex commands with 80% accuracy   5. Pt will participate in ongoing assessment of reading,writing and visual spatial aspects.        Outcome: Ongoing (interventions implemented as appropriate)  Recommend NPO at this time and MBSS on 6/27 in order to determine safest, least restrictive diet.     STACIE Sol, CCC-SLP  [phone]  6/26/2017

## 2017-06-26 NOTE — PT/OT/SLP PROGRESS
Speech Language Pathology  Pt not seen  Emmett Perez  MRN: [id number]    Patient not seen today secondary to Patient not seen today secondary to Pt being prepped for transport to CT.  ST will follow-up .    Gardenia Yañez CCC-SLP   Speech Language Pathologist  Pager [phone]  6/26/2017

## 2017-06-26 NOTE — PLAN OF CARE
Sw informed PHN has denied inpatient rehab request. Sw visited with pt's daughter and wife and they are in agreement with denial as pt is lethargic. Sw requested SNF consult and informed SNF liaison of pt. Sw will follow.     Kiran Gonzalez, SAIRA   [id number]

## 2017-06-26 NOTE — ASSESSMENT & PLAN NOTE
-  Encourage mobility, OOB in chair at least 3 hours per day, and ambulation.  -  PT/OT evaluate and treat.  Functional status: Participating with OT/PT    -  SLP speech and cognitive evaluate and treat.   Cognitive/Speech/Language status:  Cognitive-linguistic impairments noted.   Nutrition/Swallow Status:  Passed bedside swallow evaluation.  Diet recommendation from SLP. Regular and thing liquids.   -  Monitor sleep disturbances and establish consistent sleep-wake cycle.  -  Monitor for bowel and bladder dysfunction.    -  Monitor for shoulder pain and subluxation.  -  Monitor for spasticity.  -  Monitor for skin breakdown and pressure ulcers (early mobility, repositioning/weight shifting every 20-30 minutes when sitting, turn patient every 2 hours, proper mattress/overlay and chair cushioning, and pressure relief/heel protector boots)  -  DVT prophylaxis:  Lovenox.  -  Reviewed discharge options with patient (inpatient rehab, SNF, home health therapy, and outpatient therapy).  Encourage participation with therapy.

## 2017-06-26 NOTE — PT/OT/SLP PROGRESS
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 : Yes  Keep patient NPO?: Yes   General Precautions: Standard, aspiration, fall  Current Respiratory Status:  (room air)       Subjective:  "Sirisha." Pt recalled wife's name.     Pain/Comfort  Pain Rating 1: 0/10  Pain Rating Post-Intervention 1: 0/10    Objective:   Patient found with: telemetry   Pt awake upon entry, daughter asleep at b/s and wife giving him straw sips thin water in reclined position. Following second straw sip, cough elicited. Pt and wife educated re: need for fully upright position for all PO intake and straw avoidance. HOB raised. Pt oriented to place given phonemic cue, unable to orient to month and year. Pt observed with cup sip water x1. Hoarse vocal quality and delayed cough noted. Upon clinician prompt, pt completed volitional cough which appeared successful to eject any remaining material from vocal folds, as vocal quality returned to baseline. Pt observed with 1/2 tsp apple sauce x2 with throat clear noted following second trial. Following nectar thick cup sip water, wet vocal quality and delayed cough observed. Over the course of PO trial administrations, SPO2 dec'd from 95-97% to 92%. No further PO trials administered this date. While pt and pt's wife were educated re: overt s/s aspiration warranting SLP recommending NPO at this time and MBSS for 6/27, spontaneous coughing/ throat clearing noted. White board updated. No further questions. Results discussed with RN and MD. "     Assessment:  Emmett Perez is a 84 y.o. 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     Discharge recommendations: Discharge Facility/Level Of Care Needs: rehabilitation facility     Goals:    SLP Goals        Problem: SLP Goal    Goal Priority Disciplines Outcome   SLP Goal     SLP Ongoing (interventions implemented as appropriate)   Description:  Speech Language Pathology Goals  Goals expected to be met by 6/29    1. Pt will tolerate regular diet/thin liquids without overt s/s of aspiration  2. Pt will orient to place and time with mod cues  3. Pt will complete problem solving tasks with 70% accuracy and min cues  4. Pt will follow complex commands with 80% accuracy   5. Pt will participate in ongoing assessment of reading,writing and visual spatial aspects.                          Plan:   Patient to be seen Therapy Frequency: 5 x/week   Plan of Care expires: 07/21/17  Plan of Care reviewed with: patient, spouse  SLP Follow-up?: Yes              STACIE Sol, CCC-SLP  [phone]  6/26/2017

## 2017-06-26 NOTE — PROGRESS NOTES
[facility]  Physical Medicine & Rehab  Progress Note    Patient Name: Emmett Perez  MRN: [id number]  Admission Date: 6/22/2017  Length of Stay: 4 days  Collaborating Physician : Camilo Navarro MD    Subjective:     Interval History (06/26/2017):  Patient is seen for follow-up rehab evaluation and recommendations. Encephalopathy noted over the weekend on 6/25. Most likely due to underlying dementia, but delirium could be aquired from hospital stay.     HPI, Past Medical, Surgical, Family, and Social History remains the same as documented in the initial encounter.    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 Bed mobility Luciano-ModA.  Sit to stand Luciano-ModA & RW.  Ambulated 10ft x 2 trials with ModA & RW. UBD ModA and LBD ModA.  6/22/17: SLP evaluated. Diet Regular and thin liquids. Cognitive-linguistic impairments noted.  6/23/17: PT/OT evaluations pending.  6/25/17: Participating with OT. Bed mobility ModA-MaxA.  Sit to stand ModA & RW.  No ambulation 2/2 lethargy.   LBD ModA-MaxA.        Scheduled Medications:    amlodipine  10 mg Oral Daily    atorvastatin  40 mg Oral Daily    clopidogrel  75 mg Oral Daily    donepezil  10 mg Oral Daily    enoxaparin  40 mg Subcutaneous Daily    insulin detemir  5 Units Subcutaneous Daily    losartan  12.5 mg Oral Daily    memantine  10 mg Oral BID    metoprolol succinate  25 mg Oral Daily    midazolam (PF)  2 mg Intravenous Once    polyethylene glycol  17 g Oral Once    senna-docusate 8.6-50 mg  1 tablet Oral BID    sodium chloride 0.9%  3 mL Intravenous Q8H       PRN Medications: acetaminophen, benzonatate, dextrose 50%, dextrose 50%, glucagon (human recombinant), glucose, glucose, insulin aspart, labetalol, ondansetron, polyethylene glycol, polyethylene glycol, sodium chloride 0.9%    Review of Systems   Constitutional: Positive for fatigue. Negative for  chills and fever.   HENT: Negative for drooling, facial swelling, trouble swallowing and voice change.    Eyes: Negative for photophobia and visual disturbance.   Respiratory: Negative for cough, shortness of breath and wheezing.    Cardiovascular: Negative for chest pain and palpitations.   Gastrointestinal: Negative for abdominal distention, nausea and vomiting.   Genitourinary: Negative for difficulty urinating and flank pain.   Musculoskeletal: Negative for arthralgias, gait problem and myalgias.   Skin: Negative for color change and rash.   Neurological: Positive for facial asymmetry, speech difficulty and weakness. Negative for numbness.   Psychiatric/Behavioral: Positive for confusion. Negative for agitation.     Objective:     Vital Signs (Most Recent):  Temp: 97.6 °F (36.4 °C) (17 08)  Pulse: (!) 58 (17 0700)  Resp: 18 (17 08)  BP: (!) 113/56 (17 08)  SpO2: (!) 94 % (17 08)    Vital Signs (24h Range):  Temp:  [96.4 °F (35.8 °C)-97.6 °F (36.4 °C)] 97.6 °F (36.4 °C)  Pulse:  [58-88] 58  Resp:  [17-18] 18  SpO2:  [90 %-100 %] 94 %  BP: (113-149)/(56-83) 113/56     Physical Exam   Constitutional: He appears well-developed and well-nourished.   HENT:   Head: Normocephalic and atraumatic.   Eyes: EOM are normal. Pupils are equal, round, and reactive to light.   Neck: Normal range of motion.   Cardiovascular: Normal rate and regular rhythm.    Pulmonary/Chest: Effort normal. No respiratory distress.   Abdominal: Soft. There is no tenderness.   Musculoskeletal: Normal range of motion.   Neurological:   Neurologic:  -  Mental Status:  Lethargic , open eyes to voice. AOx2. Oriented to person and  only. Follows commands.  Recalls 3/3 objects.    -  Speech and language:  + aphasia,  No dysarthria.    -mild facial droop  -  Coordination:  Finger to nose exam:  RUE normal, LUE dysmetria.   -  Motor:  RUE: 5/5, 5/5 .  LUE: 4/5, 4/5 .  RLE: 5/5, DF 5/5, PF 5/5.  LLE: 4/5, DF  4/5, PF 4/5.  -  Tone:  normal  -  Sensory:  Intact to light touch and pin prick.   Skin: Skin is warm and dry.   Psychiatric: Cognition and memory are impaired.       Diagnostic Results:   X-Ray: Reviewed  CT: Reviewed  MRI: Reviewed  CTA: reviewed  Assessment/Plan:      Encephalopathy    -possibly hospital acquired delirium, likely 2/2 underlying dementia        Facial droop    -2/2 stroke          Hemiparesis, left    -  Encourage mobility, OOB in chair at least 3 hours per day, and ambulation.  -  PT/OT evaluate and treat.  Functional status: Participating with therapy.  -  SLP speech and cognitive evaluate and treat.   Cognitive/Speech/Language status:  Cognitive-linguistic impairments noted.   Nutrition/Swallow Status:  Passed bedside swallow evaluation.  Diet recommendation from SLP. Regular and thing liquids.   -  Monitor sleep disturbances and establish consistent sleep-wake cycle.  -  Monitor for bowel and bladder dysfunction.    -  Monitor for shoulder pain and subluxation.  -  Monitor for spasticity.  -  Monitor for skin breakdown and pressure ulcers (early mobility, repositioning/weight shifting every 20-30 minutes when sitting, turn patient every 2 hours, proper mattress/overlay and chair cushioning, and pressure relief/heel protector boots)  -  DVT prophylaxis:  Lovenox.  -  Reviewed discharge options with patient (inpatient rehab, SNF, home health therapy, and outpatient therapy).  Encourage participation with therapy.          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  -  PT/OT evaluate and treat.  Functional status: Participating with OT/PT    -  SLP speech and cognitive evaluate and treat.   Cognitive/Speech/Language status:  Cognitive-linguistic impairments noted.   Nutrition/Swallow Status:  Passed bedside swallow evaluation.  Diet recommendation from SLP. Regular and thing liquids.   -  Monitor  sleep disturbances and establish consistent sleep-wake cycle.  -  Monitor for bowel and bladder dysfunction.    -  Monitor for shoulder pain and subluxation.  -  Monitor for spasticity.  -  Monitor for skin breakdown and pressure ulcers (early mobility, repositioning/weight shifting every 20-30 minutes when sitting, turn patient every 2 hours, proper mattress/overlay and chair cushioning, and pressure relief/heel protector boots)  -  DVT prophylaxis:  Lovenox.  -  Reviewed discharge options with patient (inpatient rehab, SNF, home health therapy, and outpatient therapy).  Encourage participation with therapy.        Patient with goals for rehab. Insurance denied IPR admission. Recommendation for SNF placement. On watch list for future IPR admission.     Thank you for your consult.     Barb Umanzor NP  Department of Physical Medicine & Rehab   [facility]

## 2017-06-27 LAB
ALBUMIN SERPL BCP-MCNC: 3.2 G/DL
ALP SERPL-CCNC: 170 U/L
ALT SERPL W/O P-5'-P-CCNC: 19 U/L
ANION GAP SERPL CALC-SCNC: 8 MMOL/L
AST SERPL-CCNC: 24 U/L
BASOPHILS # BLD AUTO: 0.01 K/UL
BASOPHILS NFR BLD: 0.1 %
BILIRUB SERPL-MCNC: 1.3 MG/DL
BUN SERPL-MCNC: 26 MG/DL
CALCIUM SERPL-MCNC: 9.1 MG/DL
CHLORIDE SERPL-SCNC: 107 MMOL/L
CO2 SERPL-SCNC: 24 MMOL/L
CREAT SERPL-MCNC: 1.4 MG/DL
DIFFERENTIAL METHOD: NORMAL
EOSINOPHIL # BLD AUTO: 0.1 K/UL
EOSINOPHIL NFR BLD: 1 %
ERYTHROCYTE [DISTWIDTH] IN BLOOD BY AUTOMATED COUNT: 12.6 %
EST. GFR  (AFRICAN AMERICAN): 53 ML/MIN/1.73 M^2
EST. GFR  (NON AFRICAN AMERICAN): 45.8 ML/MIN/1.73 M^2
GLUCOSE SERPL-MCNC: 186 MG/DL
HCT VFR BLD AUTO: 41.8 %
HGB BLD-MCNC: 14.1 G/DL
LYMPHOCYTES # BLD AUTO: 1.9 K/UL
LYMPHOCYTES NFR BLD: 24.1 %
MAGNESIUM SERPL-MCNC: 1.6 MG/DL
MCH RBC QN AUTO: 27.5 PG
MCHC RBC AUTO-ENTMCNC: 33.7 %
MCV RBC AUTO: 82 FL
MONOCYTES # BLD AUTO: 0.5 K/UL
MONOCYTES NFR BLD: 6.1 %
NEUTROPHILS # BLD AUTO: 5.3 K/UL
NEUTROPHILS NFR BLD: 68.4 %
PHOSPHATE SERPL-MCNC: 3.3 MG/DL
PLATELET # BLD AUTO: 222 K/UL
PMV BLD AUTO: 11.5 FL
POCT GLUCOSE: 173 MG/DL (ref 70–110)
POCT GLUCOSE: 179 MG/DL (ref 70–110)
POCT GLUCOSE: 236 MG/DL (ref 70–110)
POCT GLUCOSE: 285 MG/DL (ref 70–110)
POTASSIUM SERPL-SCNC: 3.9 MMOL/L
PROT SERPL-MCNC: 6.7 G/DL
RBC # BLD AUTO: 5.13 M/UL
SODIUM SERPL-SCNC: 139 MMOL/L
WBC # BLD AUTO: 7.75 K/UL

## 2017-06-27 PROCEDURE — 92611 MOTION FLUOROSCOPY/SWALLOW: CPT

## 2017-06-27 PROCEDURE — 97535 SELF CARE MNGMENT TRAINING: CPT

## 2017-06-27 PROCEDURE — G8996 SWALLOW CURRENT STATUS: HCPCS | Mod: CI

## 2017-06-27 PROCEDURE — 84100 ASSAY OF PHOSPHORUS: CPT

## 2017-06-27 PROCEDURE — 87449 NOS EACH ORGANISM AG IA: CPT

## 2017-06-27 PROCEDURE — 97110 THERAPEUTIC EXERCISES: CPT

## 2017-06-27 PROCEDURE — 20600001 HC STEP DOWN PRIVATE ROOM

## 2017-06-27 PROCEDURE — 36415 COLL VENOUS BLD VENIPUNCTURE: CPT

## 2017-06-27 PROCEDURE — 25000003 PHARM REV CODE 250: Performed by: PSYCHIATRY & NEUROLOGY

## 2017-06-27 PROCEDURE — 25000003 PHARM REV CODE 250: Performed by: NURSE PRACTITIONER

## 2017-06-27 PROCEDURE — 97530 THERAPEUTIC ACTIVITIES: CPT

## 2017-06-27 PROCEDURE — 80053 COMPREHEN METABOLIC PANEL: CPT

## 2017-06-27 PROCEDURE — 87427 SHIGA-LIKE TOXIN AG IA: CPT

## 2017-06-27 PROCEDURE — 85025 COMPLETE CBC W/AUTO DIFF WBC: CPT

## 2017-06-27 PROCEDURE — 94640 AIRWAY INHALATION TREATMENT: CPT

## 2017-06-27 PROCEDURE — 25000242 PHARM REV CODE 250 ALT 637 W/ HCPCS: Performed by: PSYCHIATRY & NEUROLOGY

## 2017-06-27 PROCEDURE — 94761 N-INVAS EAR/PLS OXIMETRY MLT: CPT

## 2017-06-27 PROCEDURE — 99233 SBSQ HOSP IP/OBS HIGH 50: CPT | Mod: ,,, | Performed by: PSYCHIATRY & NEUROLOGY

## 2017-06-27 PROCEDURE — G8997 SWALLOW GOAL STATUS: HCPCS | Mod: CH

## 2017-06-27 PROCEDURE — 87045 FECES CULTURE AEROBIC BACT: CPT

## 2017-06-27 PROCEDURE — 94664 DEMO&/EVAL PT USE INHALER: CPT

## 2017-06-27 PROCEDURE — 63600175 PHARM REV CODE 636 W HCPCS: Performed by: NURSE PRACTITIONER

## 2017-06-27 PROCEDURE — 83735 ASSAY OF MAGNESIUM: CPT

## 2017-06-27 PROCEDURE — 87046 STOOL CULTR AEROBIC BACT EA: CPT

## 2017-06-27 RX ORDER — ACETAMINOPHEN 325 MG/1
650 TABLET ORAL EVERY 6 HOURS PRN
Status: DISCONTINUED | OUTPATIENT
Start: 2017-06-27 | End: 2017-06-29 | Stop reason: HOSPADM

## 2017-06-27 RX ORDER — SODIUM CHLORIDE 9 MG/ML
INJECTION, SOLUTION INTRAVENOUS CONTINUOUS
Status: DISCONTINUED | OUTPATIENT
Start: 2017-06-27 | End: 2017-06-29 | Stop reason: HOSPADM

## 2017-06-27 RX ADMIN — CLOPIDOGREL 75 MG: 75 TABLET, FILM COATED ORAL at 12:06

## 2017-06-27 RX ADMIN — ENOXAPARIN SODIUM 40 MG: 100 INJECTION SUBCUTANEOUS at 06:06

## 2017-06-27 RX ADMIN — Medication 3 ML: at 09:06

## 2017-06-27 RX ADMIN — IPRATROPIUM BROMIDE AND ALBUTEROL SULFATE 3 ML: .5; 3 SOLUTION RESPIRATORY (INHALATION) at 01:06

## 2017-06-27 RX ADMIN — DONEPEZIL HYDROCHLORIDE 10 MG: 5 TABLET, FILM COATED ORAL at 12:06

## 2017-06-27 RX ADMIN — LOSARTAN POTASSIUM 12.5 MG: 25 TABLET, FILM COATED ORAL at 12:06

## 2017-06-27 RX ADMIN — IPRATROPIUM BROMIDE AND ALBUTEROL SULFATE 3 ML: .5; 3 SOLUTION RESPIRATORY (INHALATION) at 07:06

## 2017-06-27 RX ADMIN — MEMANTINE HYDROCHLORIDE 10 MG: 10 TABLET, FILM COATED ORAL at 12:06

## 2017-06-27 RX ADMIN — IPRATROPIUM BROMIDE AND ALBUTEROL SULFATE 3 ML: .5; 3 SOLUTION RESPIRATORY (INHALATION) at 12:06

## 2017-06-27 RX ADMIN — AMLODIPINE BESYLATE 10 MG: 10 TABLET ORAL at 12:06

## 2017-06-27 RX ADMIN — BENZONATATE 100 MG: 100 CAPSULE ORAL at 01:06

## 2017-06-27 RX ADMIN — INSULIN ASPART 3 UNITS: 100 INJECTION, SOLUTION INTRAVENOUS; SUBCUTANEOUS at 06:06

## 2017-06-27 RX ADMIN — ATORVASTATIN CALCIUM 40 MG: 20 TABLET, FILM COATED ORAL at 11:06

## 2017-06-27 RX ADMIN — METOPROLOL SUCCINATE 25 MG: 25 TABLET, EXTENDED RELEASE ORAL at 12:06

## 2017-06-27 RX ADMIN — MEMANTINE HYDROCHLORIDE 10 MG: 10 TABLET, FILM COATED ORAL at 09:06

## 2017-06-27 RX ADMIN — SODIUM CHLORIDE: 0.9 INJECTION, SOLUTION INTRAVENOUS at 02:06

## 2017-06-27 RX ADMIN — INSULIN ASPART 2 UNITS: 100 INJECTION, SOLUTION INTRAVENOUS; SUBCUTANEOUS at 12:06

## 2017-06-27 RX ADMIN — BENZONATATE 100 MG: 100 CAPSULE ORAL at 09:06

## 2017-06-27 RX ADMIN — IPRATROPIUM BROMIDE AND ALBUTEROL SULFATE 3 ML: .5; 3 SOLUTION RESPIRATORY (INHALATION) at 06:06

## 2017-06-27 NOTE — PT/OT/SLP PROGRESS
Physical Therapy      Emmett Perez  MRN: [id number]    Patient not seen today secondary to Other (Comment) (Attempted at 1140, pt just completing OT session, attempted at 13:20, pt's family feeding pt. Attempted at 1500, family discussion with MD. PT unable to return.). Will follow-up as POC allows.    Lily Kendall PT, DPT  6/27/2017  Pager: [phone]

## 2017-06-27 NOTE — ASSESSMENT & PLAN NOTE
Stroke risk factor  - uncontrolled  HgBA1c 9.8  - detemir 5 unit qam  SSI  - passed MBSS and started on diabetic diet, will follow blood glucose and adjust insulin accordingly  - ambulatory referral to endocrine at discharge

## 2017-06-27 NOTE — SUBJECTIVE & OBJECTIVE
Neurologic Chief Complaint: left sided weakness    Subjective:     Interval History: Patient is seen for follow-up neurological assessment and treatment recommendations:   No acute events overnight. Concerns that patient is lethargic and intermittently confused. This is reportedly how he has been a week prior to admission.    HPI, Past Medical, Family, and Social History remains the same as documented in the initial encounter.     Review of Systems   Constitutional: Negative for fatigue and fever.   Respiratory: Negative for shortness of breath.    Cardiovascular: Negative for chest pain.   Gastrointestinal: Positive for diarrhea.   Neurological: Positive for weakness.     Scheduled Meds:   albuterol-ipratropium 2.5mg-0.5mg/3mL  3 mL Nebulization Q6H    amlodipine  10 mg Oral Daily    atorvastatin  40 mg Oral Daily    benzonatate  100 mg Oral TID    clopidogrel  75 mg Oral Daily    donepezil  10 mg Oral Daily    enoxaparin  40 mg Subcutaneous Daily    insulin detemir  5 Units Subcutaneous Daily    losartan  12.5 mg Oral Daily    memantine  10 mg Oral BID    metoprolol succinate  25 mg Oral Daily    polyethylene glycol  17 g Oral Once    senna-docusate 8.6-50 mg  1 tablet Oral BID    sodium chloride 0.9%  3 mL Intravenous Q8H     Continuous Infusions:   sodium chloride 0.9% 40 mL/hr at 06/27/17 0740    sodium chloride 0.9%       PRN Meds:dextrose 50%, dextrose 50%, glucagon (human recombinant), glucose, glucose, insulin aspart, labetalol, ondansetron, polyethylene glycol, sodium chloride 0.9%    Objective:     Vital Signs (Most Recent):  Temp: 97.8 °F (36.6 °C) (06/27/17 0740)  Pulse: 62 (06/27/17 1100)  Resp: 18 (06/27/17 0740)  BP: (!) 159/84 (06/27/17 0740)  SpO2: 97 % (06/27/17 0740)  BP Location: Left arm    Vital Signs Range (Last 24H):  Temp:  [96.2 °F (35.7 °C)-98.7 °F (37.1 °C)]   Pulse:  []   Resp:  [16-25]   BP: (128-181)/(72-84)   SpO2:  [91 %-100 %]   BP Location: Left  arm    Physical Exam   Constitutional: He appears well-developed and well-nourished.   HENT:   Head: Normocephalic and atraumatic.   Eyes: Pupils are equal, round, and reactive to light.   Cardiovascular: Normal rate.    Pulmonary/Chest: Effort normal. No respiratory distress.   Vitals reviewed.      Neurological Exam:   LOC: alert and follows requests  Language: No aphasia  Speech: Dysarthria  Orientation: Person, Not oriented to time  Visual Fields (CN II): Full  EOM (CN III, IV, VI): Full/intact  Pupils (CN III, IV, VI): PERRL  Facial Sensation (CN V): Symmetric  Facial Movement (CN VII): lower weakness left lower  Shoulder/Neck (CN XI): SCM-Left: Normal ; SCM-Right: Normal ; Shoulder Shrug: Normal/Symetric  Tongue (CN XII): to midline  Motor*: Arm Left:  Paretic:  4/5, Leg Left:   Paretic:  3/5, Arm Right:   Normal (5/5), Leg Right:   Normal (5/5)  Cerebellar*: Normal limb  Sensation: intact to light touch, temperature and vibration  Tone: Arm-Left: normal; Leg-Left: normal; Arm-Right: normal; Leg-Right: normal    NIH Stroke Scale:    Level of Consciousness: 0 - alert  LOC Questions: 1 - answers one correctly (at cognitive baseline from dementia)  LOC Commands: 0 - performs both correctly  Best Gaze: 0 - normal  Visual: 0 - no visual loss  Facial Palsy: 2 - partial  Motor Left Arm: 1 - drift  Motor Right Arm: 0 - no drift  Motor Left Le - can't resist gravity  Motor Right Le - no drift  Limb Ataxia: 0 - absent  Sensory: 0 - normal  Best Language: 0 - no aphasia  Dysarthria: 1 - mild to moderate dysarthria  Extinction and Inattention: 0 - no neglect  NIH Stroke Scale Total: 7      Laboratory:  CMP:     Recent Labs  Lab 17   CALCIUM 9.1   ALBUMIN 3.2*   PROT 6.7      K 3.9   CO2 24      BUN 26*   CREATININE 1.4   ALKPHOS 170*   ALT 19   AST 24   BILITOT 1.3*     BMP:     Recent Labs  Lab 17  0437      K 3.9      CO2 24   BUN 26*   CREATININE 1.4   CALCIUM 9.1      CBC:     Recent Labs  Lab 06/27/17  0437   WBC 7.75   RBC 5.13   HGB 14.1   HCT 41.8      MCV 82   MCH 27.5   MCHC 33.7     Lipid Panel:     Recent Labs  Lab 06/22/17  0017   CHOL 133   LDLCALC 70.6   HDL 42   TRIG 102     Coagulation:     Recent Labs  Lab 06/22/17  0440   INR 1.0   APTT 21.8     Platelet Aggregation Study: No results for input(s): PLTAGG, PLTAGINTERP, PLTAGREGLACO, ADPPLTAGGREG in the last 168 hours.  Hgb A1C:     Recent Labs  Lab 06/22/17  0017   HGBA1C 9.8*     TSH:     Recent Labs  Lab 06/22/17  0017   TSH 1.710       Diagnostic Results:  I have personally reviewed:   Findings:     MRI Brain WO contrast (6/22/17)  Right thalamic infarct. +cytotoxic cerebral edema.         CT head without contrast (6/26/17)  Right thalamic infarct.

## 2017-06-27 NOTE — PROCEDURES
Modifed Barium Swallow Study  Speech Start Time: 0935  Speech Stop Time: 0950  Speech Total (min): 15 min    SLP Treatment Date: 06/27/17    Reason for Referral  Patient was referred for a Modified Barium Swallow Study to assess the efficiency of his/her swallow function, rule out aspiration and make recommendations regarding safe dietary consistencies, effective compensatory strategies, and safe eating environment.     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 No STRAWS! Upright for all meals. Strict aspiration precautions. Crush all meds. Pt should be awake and alert before po intake.     Oral Peripheral Examination  Oral Musculature Evaluation  Oral Musculature: WFL  Dentition: present and adequate  Mucosal Quality: good  Mandibular Strength and Mobility: WFL  Oral Labial Strength and Mobility: WFL  Volitional Cough: adequate  Voice Prior to PO Intake: clear    Consistencies Assessed  Thin liquids 120 ml via teaspoon, cup and straw, Nectar thick liquids 2 cup sips, Puree 2 teaspoons and Solids 1/4 cracker    Oral Preparation / Oral Phase  wfl    Pharyngeal Phase  Swallow delay with pooling to the pyriforms  Pt with inconsistent penetration that cleared with thin liquids  Trace aspiration of thin liquids with straw sip with cough and ability to clear aspirated material  No penetration or aspiration with nectar thick liquids, puree or cracker  Pt with min valleculae stasis  and trace pyriform sinus stasis  Cues to dry swallow helped clear stasis  Reduced tongue base retraction   Decreased laryngeal elevation    Cervical Esophageal Phase  wfl    Impressions  Min pharyngeal dysphagia    Prognosis/Plan/Education  Good; education provided at length to daughter and pt. Daughter present for study and expressed good understanding of all information. Cont speech tx.     Care Plan    SLP Goals        Problem: SLP Goal    Goal Priority Disciplines Outcome   SLP Goal     SLP Ongoing (interventions implemented as appropriate)   Description:  Speech Language Pathology Goals  Goals expected to be met by 6/29    1. Pt will tolerate regular diet/thin liquids without overt s/s of aspiration  2. Pt will orient to place and time with mod cues  3. Pt will complete problem solving tasks with 70% accuracy and min cues  4. Pt will follow complex commands with 80% accuracy   5. Pt will participate in ongoing assessment of reading,writing and visual spatial aspects.                          G-Codes  Functional Assessment Tool Used: noms  Score: 6  Functional Limitations: Swallowing  Swallow Current Status (): CI  Swallow Goal Status (): STACIE Albert, CCC-SLP  6/27/2017

## 2017-06-27 NOTE — PROGRESS NOTES
[facility]  Vascular Neurology  Comprehensive Stroke Center  Progress Note    Assessment/Plan:     85 y/o male with left sided weakness and facial droop. Stroke W/U in progress. Not TPA candidate as out of window, not IR as minimal symptoms.     06/27/17 passed MBSS with crushed meds and no straw-regular diet and thin liquids. Will adjust insulin based on dietary intake. Daughter concerned about multiple issues including diabetes, lethargy, dementia diagnosis, PO intake, and diarrhea. Ordered calorie counts and stool culture. Patient will need neuropsych and endocrine outpatient follow up at discharge. Explained to the patient's daughter that lethargy is common post stroke and recommended stimulating patient. Nurse was instructed to try and get patient into the chair as often as possible.    Encephalopathy    - likely due to underlying dementia, possibly hospital acquired delirium   - labs are unremarkable overall; infectious workup is negative.   - delirium precautions  - cont to monitor        Cytotoxic cerebral edema    2/2 stroke  Noted on imaging        Combined systolic and diastolic cardiac dysfunction    - ECHO with 35% EF; +diastolic dysfunction  - losartan  - toprol xl 25 mg daily        Cerebral microvascular disease    Likely etiology of stroke  - plan as above        Facial droop    2/2 stroke        Hemiparesis, left    2/2 stroke  PT/OT recommending rehab        Essential hypertension    Stroke risk factor  SBP <160  Losartan, amlodipine        Dementia without behavioral disturbance    - cont donepezil and memantine  - ambulatory referral to neuropsych        Type 2 diabetes mellitus with diabetic polyneuropathy, with long-term current use of insulin    Stroke risk factor  - uncontrolled  HgBA1c 9.8  - detemir 5 unit qam  SSI  - passed MBSS and started on diabetic diet, will follow blood glucose and adjust insulin accordingly  - ambulatory referral to endocrine at discharge         Chronic kidney disease, stage III (moderate)    Baseline Cr 1.3-1.9  stable        * 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 Imaging positive for right thalamocapsular infarct; lacunar infarct. Likely etiology of stroke is small vessel ischemic disease.    Antithrombotics for secondary stroke prevention: Antiplatelets:  Clopidogrel 75mg qd  Atorvastatin- 40 mg oral daily  VTE Prophylaxis: lovenox 40mg qd\  Rehab candidate             Neurologic Chief Complaint: left sided weakness    Subjective:     Interval History: Patient is seen for follow-up neurological assessment and treatment recommendations:   No acute events overnight. Concerns that patient is lethargic and intermittently confused. This is reportedly how he has been a week prior to admission.    HPI, Past Medical, Family, and Social History remains the same as documented in the initial encounter.     Review of Systems   Constitutional: Negative for fatigue and fever.   Respiratory: Negative for shortness of breath.    Cardiovascular: Negative for chest pain.   Gastrointestinal: Positive for diarrhea.   Neurological: Positive for weakness.     Scheduled Meds:   albuterol-ipratropium 2.5mg-0.5mg/3mL  3 mL Nebulization Q6H    amlodipine  10 mg Oral Daily    atorvastatin  40 mg Oral Daily    benzonatate  100 mg Oral TID    clopidogrel  75 mg Oral Daily    donepezil  10 mg Oral Daily    enoxaparin  40 mg Subcutaneous Daily    insulin detemir  5 Units Subcutaneous Daily    losartan  12.5 mg Oral Daily    memantine  10 mg Oral BID    metoprolol succinate  25 mg Oral Daily    polyethylene glycol  17 g Oral Once    senna-docusate 8.6-50 mg  1 tablet Oral BID    sodium chloride 0.9%  3 mL Intravenous Q8H     Continuous Infusions:   sodium chloride 0.9% 40 mL/hr at 06/27/17 0740    sodium chloride 0.9%       PRN Meds:dextrose 50%,  dextrose 50%, glucagon (human recombinant), glucose, glucose, insulin aspart, labetalol, ondansetron, polyethylene glycol, sodium chloride 0.9%    Objective:     Vital Signs (Most Recent):  Temp: 97.8 °F (36.6 °C) (17 0740)  Pulse: 62 (17 1100)  Resp: 18 (17 0740)  BP: (!) 159/84 (17 0740)  SpO2: 97 % (17 0740)  BP Location: Left arm    Vital Signs Range (Last 24H):  Temp:  [96.2 °F (35.7 °C)-98.7 °F (37.1 °C)]   Pulse:  []   Resp:  [16-25]   BP: (128-181)/(72-84)   SpO2:  [91 %-100 %]   BP Location: Left arm    Physical Exam   Constitutional: He appears well-developed and well-nourished.   HENT:   Head: Normocephalic and atraumatic.   Eyes: Pupils are equal, round, and reactive to light.   Cardiovascular: Normal rate.    Pulmonary/Chest: Effort normal. No respiratory distress.   Vitals reviewed.      Neurological Exam:   LOC: alert and follows requests  Language: No aphasia  Speech: Dysarthria  Orientation: Person, Not oriented to time  Visual Fields (CN II): Full  EOM (CN III, IV, VI): Full/intact  Pupils (CN III, IV, VI): PERRL  Facial Sensation (CN V): Symmetric  Facial Movement (CN VII): lower weakness left lower  Shoulder/Neck (CN XI): SCM-Left: Normal ; SCM-Right: Normal ; Shoulder Shrug: Normal/Symetric  Tongue (CN XII): to midline  Motor*: Arm Left:  Paretic:  4/5, Leg Left:   Paretic:  3/5, Arm Right:   Normal (5/5), Leg Right:   Normal (5/5)  Cerebellar*: Normal limb  Sensation: intact to light touch, temperature and vibration  Tone: Arm-Left: normal; Leg-Left: normal; Arm-Right: normal; Leg-Right: normal    NIH Stroke Scale:    Level of Consciousness: 0 - alert  LOC Questions: 1 - answers one correctly (at cognitive baseline from dementia)  LOC Commands: 0 - performs both correctly  Best Gaze: 0 - normal  Visual: 0 - no visual loss  Facial Palsy: 2 - partial  Motor Left Arm: 1 - drift  Motor Right Arm: 0 - no drift  Motor Left Le - can't resist gravity  Motor Right  Le - no drift  Limb Ataxia: 0 - absent  Sensory: 0 - normal  Best Language: 0 - no aphasia  Dysarthria: 1 - mild to moderate dysarthria  Extinction and Inattention: 0 - no neglect  NIH Stroke Scale Total: 7      Laboratory:  CMP:     Recent Labs  Lab 17  043   CALCIUM 9.1   ALBUMIN 3.2*   PROT 6.7      K 3.9   CO2 24      BUN 26*   CREATININE 1.4   ALKPHOS 170*   ALT 19   AST 24   BILITOT 1.3*     BMP:     Recent Labs  Lab 17      K 3.9      CO2 24   BUN 26*   CREATININE 1.4   CALCIUM 9.1     CBC:     Recent Labs  Lab 17   WBC 7.75   RBC 5.13   HGB 14.1   HCT 41.8      MCV 82   MCH 27.5   MCHC 33.7     Lipid Panel:     Recent Labs  Lab 17  001   CHOL 133   LDLCALC 70.6   HDL 42   TRIG 102     Coagulation:     Recent Labs  Lab 170   INR 1.0   APTT 21.8     Platelet Aggregation Study: No results for input(s): PLTAGG, PLTAGINTERP, PLTAGREGLACO, ADPPLTAGGREG in the last 168 hours.  Hgb A1C:     Recent Labs  Lab 17  001   HGBA1C 9.8*     TSH:     Recent Labs  Lab 17  0017   TSH 1.710       Diagnostic Results:  I have personally reviewed:   Findings:     MRI Brain WO contrast (17)  Right thalamic infarct. +cytotoxic cerebral edema.         CT head without contrast (17)  Right thalamic infarct.                Merced Bello PA-C  Comprehensive Stroke Center  Department of Vascular Neurology   [facility]

## 2017-06-27 NOTE — PT/OT/SLP PROGRESS
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  Pt with no complaints however was agreeable to sitting EOB.  Pain/Comfort  Pain Rating 1: 0/10  Pain Rating Post-Intervention 1: 0/10 (no indication of pain during session)    Objective:  Patient found with: telemetry, oxygen     Functional Mobility:  Bed Mobility:  Rolling/Turning to Left: Moderate assistance (x multiple trials)  Rolling/Turning Right: Moderate assistance (x multiple trials)  Scooting/Bridging: Maximum Assistance (scooting to the left along EOB; dependent drawsheet transfer up HOB while supine)  Supine to Sit: Maximum Assistance  Sit to Supine: Maximum Assistance    Transfers:   Sit <> Stand Assistance: Moderate Assistance (from EOB; Moderate (A) with static standing for extended time with (A) at trunk, to facilitate fully erect stand & to prevent buckling of BLE)  Sit <> Stand Assistive Device: No Assistive Device    Activities of Daily Living:     UE Dressing Level of Assistance: Maximum assistance (donning gown around back while seated EOB)  LE Dressing Level of Assistance: Maximum assistance (donning socks while seated EOB)  Toileting Where Assessed: Bed level  Toileting Level of Assistance: Total assistance (due to incontinence)     Therapeutic Activities and Exercises:  Pt required Min-Mod (A) with postural control while seated EOB with (A) to decrease posterior & leftward leaning.  Pt required verbal & physical cues to  "facilitate postural control.  Provided LUE weight bearing while seated EOB.  Provided PROM to BUE in all planes x 10 reps each while supine.  Pt required Mod (A) with static standing trials.  Pt & daughter had no further questions & when asked whether there were any concerns pt & daughter reported none.      AM-PAC 6 CLICK ADL   How much help from another person does this patient currently need?   1 = Unable, Total/Dependent Assistance  2 = A lot, Maximum/Moderate Assistance  3 = A little, Minimum/Contact Guard/Supervision  4 = None, Modified [place]/Independent    Putting on and taking off regular lower body clothing? : 2  Bathing (including washing, rinsing, drying)?: 2  Toileting, which includes using toilet, bedpan, or urinal? : 1  Putting on and taking off regular upper body clothing?: 2  Taking care of personal grooming such as brushing teeth?: 2  Eating meals?: 1  Total Score: 10     AM-PAC Raw Score CMS "G-Code Modifier Level of Impairment Assistance   6 % Total / Unable   7 - 8 CM 80 - 100% Maximal Assist   9-13 CL 60 - 80% Moderate Assist   14 - 19 CK 40 - 60% Moderate Assist   20 - 22 CJ 20 - 40% Minimal Assist   23 CI 1-20% SBA / CGA   24 CH 0% Independent/ Mod I       Patient left supine with all lines intact, call button in reach, bed alarm on, RN notified, daughter present and white board updated.    ASSESSMENT:  Emmett Perez is a 84 y.o. 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  Pt with slightly improved alertness on this date.  Pt continues to require increased (A) for transfers, ADL's, & postural control.    Rehab identified problem list/impairments: Rehab identified problem list/impairments: weakness, impaired endurance, impaired sensation, impaired functional mobilty, impaired self care skills, impaired balance, impaired cognition, decreased lower extremity function, decreased " upper extremity function, decreased coordination, decreased safety awareness    Rehab potential is fair.    Activity tolerance: Fair    Discharge recommendations: Discharge Facility/Level Of Care Needs: rehabilitation facility     GOALS:    Occupational Therapy Goals        Problem: Occupational Therapy Goal    Goal Priority Disciplines Outcome Interventions   Occupational Therapy Goal     OT, PT/OT Ongoing (interventions implemented as appropriate)    Description:  Goals to be met by 7/6/2017:    1.  Sit eob with CG A for dynamic sitting balance during grooming tasks  2.  CG A to complete grooming tasks in supported sitting  3.  Complete UB dressing with min a  4.  Don shorts/underpants or pants with min a  5.  Toilet self with min a  6.  Toilet or BSC transfer with min a   7.  Supine to sit with minimal (A)  8.  Rolling to (B) directions with Stand by assistance                    Plan:  Patient to be seen 6 x/week to address the above listed problems via self-care/home management, therapeutic activities, therapeutic exercises, sensory integration, cognitive retraining, neuromuscular re-education  Plan of Care expires: 07/21/17  Plan of Care reviewed with: patient, daughter         Enma AIXA Bentley  06/27/2017

## 2017-06-27 NOTE — PLAN OF CARE
Problem: Occupational Therapy Goal  Goal: Occupational Therapy Goal  Goals to be met by 7/6/2017:    1.  Sit eob with CG A for dynamic sitting balance during grooming tasks  2.  CG A to complete grooming tasks in supported sitting  3.  Complete UB dressing with min a  4.  Don shorts/underpants or pants with min a  5.  Toilet self with min a  6.  Toilet or BSC transfer with min a   7.  Supine to sit with minimal (A)  8.  Rolling to (B) directions with Stand by assistance  Outcome: Ongoing (interventions implemented as appropriate)  Goals updated.

## 2017-06-27 NOTE — PLAN OF CARE
Problem: SLP Goal  Goal: SLP Goal  Speech Language Pathology Goals  Goals expected to be met by 6/29    1. Pt will tolerate regular diet/thin liquids without overt s/s of aspiration  2. Pt will orient to place and time with mod cues  3. Pt will complete problem solving tasks with 70% accuracy and min cues  4. Pt will follow complex commands with 80% accuracy   5. Pt will participate in ongoing assessment of reading,writing and visual spatial aspects.          MBS completed. Recommend thin liquids and regular diet. NO STRAWS!! Pt must be upright for all meals. Crush all meds. Strict aspiration precautions.     STACIE Sweeney, CCC-SLP  6/27/2017

## 2017-06-28 PROBLEM — R19.7 DIARRHEA: Status: ACTIVE | Noted: 2017-06-28

## 2017-06-28 LAB
ALBUMIN SERPL BCP-MCNC: 3.2 G/DL
ALP SERPL-CCNC: 158 U/L
ALT SERPL W/O P-5'-P-CCNC: 19 U/L
ANION GAP SERPL CALC-SCNC: 9 MMOL/L
AST SERPL-CCNC: 24 U/L
BASOPHILS # BLD AUTO: 0.02 K/UL
BASOPHILS NFR BLD: 0.3 %
BILIRUB SERPL-MCNC: 1.1 MG/DL
BUN SERPL-MCNC: 20 MG/DL
C DIFF GDH STL QL: NEGATIVE
C DIFF TOX A+B STL QL IA: NEGATIVE
CALCIUM SERPL-MCNC: 9.2 MG/DL
CHLORIDE SERPL-SCNC: 108 MMOL/L
CO2 SERPL-SCNC: 24 MMOL/L
CREAT SERPL-MCNC: 1.2 MG/DL
DIFFERENTIAL METHOD: ABNORMAL
E COLI SXT1 STL QL IA: NEGATIVE
E COLI SXT2 STL QL IA: NEGATIVE
EOSINOPHIL # BLD AUTO: 0.1 K/UL
EOSINOPHIL NFR BLD: 1.5 %
ERYTHROCYTE [DISTWIDTH] IN BLOOD BY AUTOMATED COUNT: 12.6 %
EST. GFR  (AFRICAN AMERICAN): >60 ML/MIN/1.73 M^2
EST. GFR  (NON AFRICAN AMERICAN): 55.2 ML/MIN/1.73 M^2
GLUCOSE SERPL-MCNC: 197 MG/DL
HCT VFR BLD AUTO: 41.7 %
HGB BLD-MCNC: 13.9 G/DL
LYMPHOCYTES # BLD AUTO: 2.1 K/UL
LYMPHOCYTES NFR BLD: 26.5 %
MAGNESIUM SERPL-MCNC: 1.7 MG/DL
MCH RBC QN AUTO: 27.2 PG
MCHC RBC AUTO-ENTMCNC: 33.3 %
MCV RBC AUTO: 82 FL
MONOCYTES # BLD AUTO: 0.5 K/UL
MONOCYTES NFR BLD: 5.9 %
NEUTROPHILS # BLD AUTO: 5.2 K/UL
NEUTROPHILS NFR BLD: 65.7 %
PHOSPHATE SERPL-MCNC: 2.8 MG/DL
PLATELET # BLD AUTO: 227 K/UL
PMV BLD AUTO: 11.7 FL
POCT GLUCOSE: 135 MG/DL (ref 70–110)
POCT GLUCOSE: 160 MG/DL (ref 70–110)
POCT GLUCOSE: 192 MG/DL (ref 70–110)
POCT GLUCOSE: 283 MG/DL (ref 70–110)
POTASSIUM SERPL-SCNC: 3.9 MMOL/L
PROT SERPL-MCNC: 6.6 G/DL
RBC # BLD AUTO: 5.11 M/UL
SODIUM SERPL-SCNC: 141 MMOL/L
WBC # BLD AUTO: 7.85 K/UL

## 2017-06-28 PROCEDURE — 25000003 PHARM REV CODE 250: Performed by: PSYCHIATRY & NEUROLOGY

## 2017-06-28 PROCEDURE — 36415 COLL VENOUS BLD VENIPUNCTURE: CPT

## 2017-06-28 PROCEDURE — 25000003 PHARM REV CODE 250: Performed by: PHYSICIAN ASSISTANT

## 2017-06-28 PROCEDURE — 20600001 HC STEP DOWN PRIVATE ROOM

## 2017-06-28 PROCEDURE — 25000242 PHARM REV CODE 250 ALT 637 W/ HCPCS: Performed by: PSYCHIATRY & NEUROLOGY

## 2017-06-28 PROCEDURE — 85025 COMPLETE CBC W/AUTO DIFF WBC: CPT

## 2017-06-28 PROCEDURE — 94640 AIRWAY INHALATION TREATMENT: CPT

## 2017-06-28 PROCEDURE — 99233 SBSQ HOSP IP/OBS HIGH 50: CPT | Mod: ,,, | Performed by: PSYCHIATRY & NEUROLOGY

## 2017-06-28 PROCEDURE — 97110 THERAPEUTIC EXERCISES: CPT

## 2017-06-28 PROCEDURE — 25000003 PHARM REV CODE 250: Performed by: NURSE PRACTITIONER

## 2017-06-28 PROCEDURE — 63600175 PHARM REV CODE 636 W HCPCS: Performed by: NURSE PRACTITIONER

## 2017-06-28 PROCEDURE — 94761 N-INVAS EAR/PLS OXIMETRY MLT: CPT

## 2017-06-28 PROCEDURE — 92526 ORAL FUNCTION THERAPY: CPT

## 2017-06-28 PROCEDURE — 83735 ASSAY OF MAGNESIUM: CPT

## 2017-06-28 PROCEDURE — 94664 DEMO&/EVAL PT USE INHALER: CPT

## 2017-06-28 PROCEDURE — 84100 ASSAY OF PHOSPHORUS: CPT

## 2017-06-28 PROCEDURE — 80053 COMPREHEN METABOLIC PANEL: CPT

## 2017-06-28 PROCEDURE — 92507 TX SP LANG VOICE COMM INDIV: CPT

## 2017-06-28 PROCEDURE — 97112 NEUROMUSCULAR REEDUCATION: CPT

## 2017-06-28 RX ORDER — LOSARTAN POTASSIUM 25 MG/1
25 TABLET ORAL DAILY
Status: DISCONTINUED | OUTPATIENT
Start: 2017-06-29 | End: 2017-06-29 | Stop reason: HOSPADM

## 2017-06-28 RX ADMIN — LOSARTAN POTASSIUM 12.5 MG: 25 TABLET, FILM COATED ORAL at 09:06

## 2017-06-28 RX ADMIN — MEMANTINE HYDROCHLORIDE 10 MG: 10 TABLET, FILM COATED ORAL at 09:06

## 2017-06-28 RX ADMIN — IPRATROPIUM BROMIDE AND ALBUTEROL SULFATE 3 ML: .5; 3 SOLUTION RESPIRATORY (INHALATION) at 08:06

## 2017-06-28 RX ADMIN — ENOXAPARIN SODIUM 40 MG: 100 INJECTION SUBCUTANEOUS at 05:06

## 2017-06-28 RX ADMIN — CLOPIDOGREL 75 MG: 75 TABLET, FILM COATED ORAL at 09:06

## 2017-06-28 RX ADMIN — Medication 3 ML: at 10:06

## 2017-06-28 RX ADMIN — IPRATROPIUM BROMIDE AND ALBUTEROL SULFATE 3 ML: .5; 3 SOLUTION RESPIRATORY (INHALATION) at 07:06

## 2017-06-28 RX ADMIN — ACETAMINOPHEN 650 MG: 325 TABLET ORAL at 08:06

## 2017-06-28 RX ADMIN — Medication 3 ML: at 02:06

## 2017-06-28 RX ADMIN — BENZONATATE 100 MG: 100 CAPSULE ORAL at 03:06

## 2017-06-28 RX ADMIN — DONEPEZIL HYDROCHLORIDE 10 MG: 5 TABLET, FILM COATED ORAL at 09:06

## 2017-06-28 RX ADMIN — AMLODIPINE BESYLATE 10 MG: 10 TABLET ORAL at 09:06

## 2017-06-28 RX ADMIN — IPRATROPIUM BROMIDE AND ALBUTEROL SULFATE 3 ML: .5; 3 SOLUTION RESPIRATORY (INHALATION) at 12:06

## 2017-06-28 RX ADMIN — BENZONATATE 100 MG: 100 CAPSULE ORAL at 08:06

## 2017-06-28 RX ADMIN — BENZONATATE 100 MG: 100 CAPSULE ORAL at 05:06

## 2017-06-28 RX ADMIN — METOPROLOL SUCCINATE 25 MG: 25 TABLET, EXTENDED RELEASE ORAL at 09:06

## 2017-06-28 RX ADMIN — Medication 3 ML: at 05:06

## 2017-06-28 RX ADMIN — ATORVASTATIN CALCIUM 40 MG: 20 TABLET, FILM COATED ORAL at 09:06

## 2017-06-28 RX ADMIN — MEMANTINE HYDROCHLORIDE 10 MG: 10 TABLET, FILM COATED ORAL at 08:06

## 2017-06-28 NOTE — PT/OT/SLP PROGRESS
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  Pt agrees to participate in session.  Pt's daughter and wife present throughout session.    Pain/Comfort  Pain Rating 1: 0/10  Pain Rating Post-Intervention 1: 0/10    Objective:   Patient found with: telemetry    Functional Mobility:  Pt found supine. Family beginning perineal care due to soiled linens.  Bed Mobility:    Rolling to the left: use of bedrails: Moderate Assistance    Rolling to the right: no use of bedrails: Moderate Assistance   Supine <> Sit: From left sidelying: Moderate Assistance   Sit <> Supine: To left sidelying: Maximum Assistance   Scooting to HOB: Moderate Assistance (pt required LLE facilitation of bridging to scoot to HOB)   Scooting to EOB: Moderate Assistance (asymetrical scooting to EOB for BLE support on floor)    Sitting Balance at Edge of Bed:   Assistance Level Required: Minimal Assistance   Time: 15 minutes   Postural deviations noted: Rounded shoulder, Head forward, Posterior pelvic tilt and Kyphosis   Encouraged: LUE weight bearing, cervical and thoracic extension, eyes open   Comments: Pt with kyphotic/slumped posture. Pt mildly pushing with RUE, able to be redirected. Pt conversive throughout trial, however eyes closed.     Transfers:    Did not attempt due to poor posture.    Therapeutic Activities and Exercises:  Supine: LLE AAROM x 8 reps in all planes through available pain-free ROM. Exercises  performed to develop and maintain pt's strength, ROM and flexibility. Patient did require vc's for attention to task due to fatigue.    Education:  Patient provided with daily orientation and goals of this PT session. Patient and family agreed to participate in session.Patient and family aware of patient's deficits and therapy progression. They were educated to transfer with therapy only. Time provided for therapeutic counseling and discussion of health disposition. All questions answered to patient's and family's satisfaction, within scope of PT practice; voiced no other concerns. White board updated in patient's room, RN notified of session.     AM-PAC 6 CLICK MOBILITY  How much help from another person does this patient currently need?   1 = Unable, Total/Dependent Assistance  2 = A lot, Maximum/Moderate Assistance  3 = A little, Minimum/Contact Guard/Supervision  4 = None, Modified [place]/Independent    Turning over in bed (including adjusting bedclothes, sheets and blankets)?: 2  Sitting down on and standing up from a chair with arms (e.g., wheelchair, bedside commode, etc.): 2  Moving from lying on back to sitting on the side of the bed?: 2  Moving to and from a bed to a chair (including a wheelchair)?: 2  Need to walk in hospital room?: 2  Climbing 3-5 steps with a railing?: 1  Total Score: 11    AM-PAC Raw Score CMS G-Code Modifier Level of Impairment Assistance   6 % Total / Unable   7 - 9 CM 80 - 100% Maximal Assist   10 - 14 CL 60 - 80% Moderate Assist   15 - 19 CK 40 - 60% Moderate Assist   20 - 22 CJ 20 - 40% Minimal Assist   23 CI 1-20% SBA / CGA   24 CH 0% Independent/ Mod I     Patient left supine with all lines intact, call button in reach, RN notified and family present.    Assessment:  Emmett Perez is a 84 y.o. 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 Patient is making progress towards goals, participating well in this session.  Pt continues to require significant assist with upright posture & balance. Pt with noted improvement with bed mobility. Pt with good initiation of movement of both LUE and LLE. Pt participated well and highly motivated to participate in therapy. Mr. Perez's deficits affect their ability to safely and independently participate in self-care tasks and functional mobility. Due to his physical therapy diagnosis of debility and deconditioning, they continue to benefit from acute PT services to address the following limitations: high fall risk, weakness, instability, the need for supervised instructions of exercise, and the decreased ability to perform functional activities which they were able to complete PTA. Education was provided to patient & family regarding importance of continued participation in therapy, patient's progress and discharge disposition. Pt would benefit from Rehab upon discharge to improve quality of life and focus on recovery of impairments.     Rehab identified problem list/impairments: Rehab identified problem list/impairments: weakness, impaired self care skills, impaired functional mobilty, gait instability, impaired balance, visual deficits, decreased upper extremity function, decreased lower extremity function, decreased safety awareness, abnormal tone, decreased ROM    Rehab potential is good.    Activity tolerance: Good    Discharge recommendations: Discharge Facility/Level Of Care Needs: rehabilitation facility     Barriers to discharge: Barriers to Discharge: Inaccessible home environment, Decreased caregiver support    Equipment recommendations: Equipment Needed After Discharge: bedside commode, wheelchair     GOALS:    Physical Therapy Goals        Problem: Physical Therapy Goal    Goal Priority Disciplines Outcome Goal Variances Interventions   Physical Therapy Goal     PT/OT, PT Ongoing (interventions implemented as appropriate)     Description:  PT goals until 7/8/17    1. Pt supine to  sit with SBA-not met  2. Pt sit to supine with SBA-not met  3. Pt sit to stand with RW with CGA-not met  4. Pt to perform gait 100ft with RW with CGA.-not met  5. Pt to transfer bed to/from bedside chair with min A via squat pivot.-not met  6. Pt to up/down 2 steps with no rail with minimal assist.-not met  7. Pt to perform B LE exs in sitting or supine x 15 reps to increase LE strength to improve functional mobility.-not met  8. Pt to perform dynamic sitting EOB x10 minutes with min A.                        PLAN:    Patient to be seen 6 x/week  to address the above listed problems via gait training, therapeutic activities, therapeutic exercises, neuromuscular re-education  Plan of Care expires: 07/22/17  Plan of Care reviewed with: patient, spouse, daughter     Lily Kendall PT, DPT  6/28/2017  Pager: [phone]

## 2017-06-28 NOTE — PLAN OF CARE
Problem: Patient Care Overview  Goal: Plan of Care Review  Outcome: Ongoing (interventions implemented as appropriate)  POC reviewed with pt and family at 1400. Family verbalized understanding. Questions and concerns addressed. No acute events today. Pt progressing toward goals. Will continue to monitor. See flowsheets for full assessment and VS info.

## 2017-06-28 NOTE — SUBJECTIVE & OBJECTIVE
Neurologic Chief Complaint: left sided weakness    Subjective:     Interval History: Patient is seen for follow-up neurological assessment and treatment recommendations:   EDIN. Patient more alert and interactive today    HPI, Past Medical, Family, and Social History remains the same as documented in the initial encounter.     Review of Systems   Constitutional: Negative for fatigue and fever.   Respiratory: Negative for shortness of breath.    Cardiovascular: Negative for chest pain.   Gastrointestinal: Negative for diarrhea, nausea and vomiting.   Neurological: Positive for weakness.     Scheduled Meds:   albuterol-ipratropium 2.5mg-0.5mg/3mL  3 mL Nebulization Q6H    amlodipine  10 mg Oral Daily    atorvastatin  40 mg Oral Daily    benzonatate  100 mg Oral TID    clopidogrel  75 mg Oral Daily    donepezil  10 mg Oral Daily    enoxaparin  40 mg Subcutaneous Daily    [START ON 6/29/2017] insulin detemir  10 Units Subcutaneous Daily    [START ON 6/29/2017] losartan  25 mg Oral Daily    memantine  10 mg Oral BID    metoprolol succinate  25 mg Oral Daily    polyethylene glycol  17 g Oral Once    senna-docusate 8.6-50 mg  1 tablet Oral BID    sodium chloride 0.9%  3 mL Intravenous Q8H     Continuous Infusions:   sodium chloride 0.9% Stopped (06/28/17 0701)    sodium chloride 0.9%       PRN Meds:acetaminophen, dextrose 50%, dextrose 50%, glucagon (human recombinant), glucose, glucose, insulin aspart, labetalol, ondansetron, polyethylene glycol, sodium chloride 0.9%    Objective:     Vital Signs (Most Recent):  Temp: 98.6 °F (37 °C) (06/28/17 0701)  Pulse: 68 (06/28/17 0708)  Resp: 16 (06/28/17 0708)  BP: (!) 198/86 (06/28/17 0701)  SpO2: 96 % (06/28/17 0708)  BP Location: Right arm    Vital Signs Range (Last 24H):  Temp:  [97.5 °F (36.4 °C)-98.6 °F (37 °C)]   Pulse:  [56-68]   Resp:  [15-20]   BP: (149-198)/(72-86)   SpO2:  [94 %-100 %]   BP Location: Right arm    Physical Exam   Constitutional: He  appears well-developed and well-nourished.   HENT:   Head: Normocephalic and atraumatic.   Eyes: Pupils are equal, round, and reactive to light.   Cardiovascular: Normal rate.    Pulmonary/Chest: Effort normal. No respiratory distress.   Neurological: He is alert.   Skin: Skin is warm and dry.   Vitals reviewed.      Neurological Exam:   LOC: alert and follows requests  Language: No aphasia  Speech: Dysarthria  Orientation: Person, Not oriented to time  Visual Fields (CN II): Full  EOM (CN III, IV, VI): Full/intact  Pupils (CN III, IV, VI): PERRL  Facial Sensation (CN V): Symmetric  Facial Movement (CN VII): lower weakness left lower  Shoulder/Neck (CN XI): SCM-Left: Normal ; SCM-Right: Normal ; Shoulder Shrug: Normal/Symetric  Tongue (CN XII): to midline  Motor*: Arm Left:  Paretic:  4/5, Leg Left:   Paretic:  3/5, Arm Right:   Normal (5/5), Leg Right:   Normal (5/5)  Cerebellar*: Normal limb  Sensation: intact to light touch, temperature and vibration  Tone: Arm-Left: normal; Leg-Left: normal; Arm-Right: normal; Leg-Right: normal    NIH Stroke Scale:    Level of Consciousness: 0 - alert  LOC Questions: 1 - answers one correctly (at cognitive baseline from dementia)  LOC Commands: 0 - performs both correctly  Best Gaze: 0 - normal  Visual: 0 - no visual loss  Facial Palsy: 2 - partial  Motor Left Arm: 1 - drift  Motor Right Arm: 0 - no drift  Motor Left Le - can't resist gravity  Motor Right Le - no drift  Limb Ataxia: 0 - absent  Sensory: 0 - normal  Best Language: 0 - no aphasia  Dysarthria: 1 - mild to moderate dysarthria  Extinction and Inattention: 0 - no neglect  NIH Stroke Scale Total: 7      Laboratory:  CMP:     Recent Labs  Lab 17  0415   CALCIUM 9.2   ALBUMIN 3.2*   PROT 6.6      K 3.9   CO2 24      BUN 20   CREATININE 1.2   ALKPHOS 158*   ALT 19   AST 24   BILITOT 1.1*     BMP:     Recent Labs  Lab 17  0415      K 3.9      CO2 24   BUN 20   CREATININE 1.2    CALCIUM 9.2     CBC:     Recent Labs  Lab 06/28/17  0415   WBC 7.85   RBC 5.11   HGB 13.9*   HCT 41.7      MCV 82   MCH 27.2   MCHC 33.3     Lipid Panel:     Recent Labs  Lab 06/22/17  0017   CHOL 133   LDLCALC 70.6   HDL 42   TRIG 102     Coagulation:     Recent Labs  Lab 06/22/17  0440   INR 1.0   APTT 21.8     Platelet Aggregation Study: No results for input(s): PLTAGG, PLTAGINTERP, PLTAGREGLACO, ADPPLTAGGREG in the last 168 hours.  Hgb A1C:     Recent Labs  Lab 06/22/17  0017   HGBA1C 9.8*     TSH:     Recent Labs  Lab 06/22/17  0017   TSH 1.710       Diagnostic Results:  I have personally reviewed:   Findings:     MRI Brain WO contrast (6/22/17)  Right thalamic infarct. +cytotoxic cerebral edema.         CT head without contrast (6/26/17)  Right thalamic infarct.

## 2017-06-28 NOTE — PROGRESS NOTES
[facility]  Vascular Neurology  Comprehensive Stroke Center  Progress Note    Assessment/Plan:     83 y/o male with left sided weakness and facial droop. Stroke W/U in progress. Not TPA candidate as out of window, not IR as minimal symptoms.     06/27/17 passed MBSS with crushed meds and no straw-regular diet and thin liquids. Will adjust insulin based on dietary intake. Daughter concerned about multiple issues including diabetes, lethargy, dementia diagnosis, PO intake, and diarrhea. Ordered calorie counts and stool culture. Patient will need neuropsych and endocrine outpatient follow up at discharge. Explained to the patient's daughter that lethargy is common post stroke and recommended stimulating patient. Nurse was instructed to try and get patient into the chair as often as possible.    06/28/17 Patient with low PO intake, currently eating about 25% of his meals, ordering ensure supplementation, stool cultures pending for diarrhea, patient on contact precautions    Diarrhea    Stool studies pending  Patient on contact precautions for possible c diff          Encephalopathy    - likely due to underlying dementia, possibly hospital acquired delirium   - labs are unremarkable overall; infectious workup is negative.   - delirium precautions  - cont to monitor        Cytotoxic cerebral edema    2/2 stroke  Noted on imaging        Combined systolic and diastolic cardiac dysfunction    - ECHO with 35% EF; +diastolic dysfunction  - losartan  - toprol xl 25 mg daily        Cerebral microvascular disease    Likely etiology of stroke  - plan as above        Facial droop    2/2 stroke        Hemiparesis, left    2/2 stroke  PT/OT recommending rehab        Essential hypertension    Stroke risk factor  SBP <160  Losartan, amlodipine        Dementia without behavioral disturbance    - cont donepezil and memantine  - ambulatory referral to neuropsych        Type 2 diabetes mellitus with diabetic  polyneuropathy, with long-term current use of insulin    Stroke risk factor  - uncontrolled  HgBA1c 9.8  - detemir 10 unit qam  SSI  - passed MBSS and started on diabetic diet, will follow blood glucose and adjust insulin accordingly  - ambulatory referral to endocrine at discharge        Chronic kidney disease, stage III (moderate)    Baseline Cr 1.3-1.9  stable        * 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 Imaging positive for right thalamocapsular infarct; lacunar infarct. Likely etiology of stroke is small vessel ischemic disease.    Antithrombotics for secondary stroke prevention: Antiplatelets:  Clopidogrel 75mg qd  Atorvastatin- 40 mg oral daily  VTE Prophylaxis: lovenox 40mg qd  Rehab candidate             Neurologic Chief Complaint: left sided weakness    Subjective:     Interval History: Patient is seen for follow-up neurological assessment and treatment recommendations:   UMESHON. Patient more alert and interactive today    HPI, Past Medical, Family, and Social History remains the same as documented in the initial encounter.     Review of Systems   Constitutional: Negative for fatigue and fever.   Respiratory: Negative for shortness of breath.    Cardiovascular: Negative for chest pain.   Gastrointestinal: Negative for diarrhea, nausea and vomiting.   Neurological: Positive for weakness.     Scheduled Meds:   albuterol-ipratropium 2.5mg-0.5mg/3mL  3 mL Nebulization Q6H    amlodipine  10 mg Oral Daily    atorvastatin  40 mg Oral Daily    benzonatate  100 mg Oral TID    clopidogrel  75 mg Oral Daily    donepezil  10 mg Oral Daily    enoxaparin  40 mg Subcutaneous Daily    [START ON 6/29/2017] insulin detemir  10 Units Subcutaneous Daily    [START ON 6/29/2017] losartan  25 mg Oral Daily    memantine  10 mg Oral BID    metoprolol succinate  25 mg Oral Daily    polyethylene glycol   17 g Oral Once    senna-docusate 8.6-50 mg  1 tablet Oral BID    sodium chloride 0.9%  3 mL Intravenous Q8H     Continuous Infusions:   sodium chloride 0.9% Stopped (06/28/17 0701)    sodium chloride 0.9%       PRN Meds:acetaminophen, dextrose 50%, dextrose 50%, glucagon (human recombinant), glucose, glucose, insulin aspart, labetalol, ondansetron, polyethylene glycol, sodium chloride 0.9%    Objective:     Vital Signs (Most Recent):  Temp: 98.6 °F (37 °C) (06/28/17 0701)  Pulse: 68 (06/28/17 0708)  Resp: 16 (06/28/17 0708)  BP: (!) 198/86 (06/28/17 0701)  SpO2: 96 % (06/28/17 0708)  BP Location: Right arm    Vital Signs Range (Last 24H):  Temp:  [97.5 °F (36.4 °C)-98.6 °F (37 °C)]   Pulse:  [56-68]   Resp:  [15-20]   BP: (149-198)/(72-86)   SpO2:  [94 %-100 %]   BP Location: Right arm    Physical Exam   Constitutional: He appears well-developed and well-nourished.   HENT:   Head: Normocephalic and atraumatic.   Eyes: Pupils are equal, round, and reactive to light.   Cardiovascular: Normal rate.    Pulmonary/Chest: Effort normal. No respiratory distress.   Neurological: He is alert.   Skin: Skin is warm and dry.   Vitals reviewed.      Neurological Exam:   LOC: alert and follows requests  Language: No aphasia  Speech: Dysarthria  Orientation: Person, Not oriented to time  Visual Fields (CN II): Full  EOM (CN III, IV, VI): Full/intact  Pupils (CN III, IV, VI): PERRL  Facial Sensation (CN V): Symmetric  Facial Movement (CN VII): lower weakness left lower  Shoulder/Neck (CN XI): SCM-Left: Normal ; SCM-Right: Normal ; Shoulder Shrug: Normal/Symetric  Tongue (CN XII): to midline  Motor*: Arm Left:  Paretic:  4/5, Leg Left:   Paretic:  3/5, Arm Right:   Normal (5/5), Leg Right:   Normal (5/5)  Cerebellar*: Normal limb  Sensation: intact to light touch, temperature and vibration  Tone: Arm-Left: normal; Leg-Left: normal; Arm-Right: normal; Leg-Right: normal    NIH Stroke Scale:    Level of Consciousness: 0 - alert  LOC  Questions: 1 - answers one correctly (at cognitive baseline from dementia)  LOC Commands: 0 - performs both correctly  Best Gaze: 0 - normal  Visual: 0 - no visual loss  Facial Palsy: 2 - partial  Motor Left Arm: 1 - drift  Motor Right Arm: 0 - no drift  Motor Left Le - can't resist gravity  Motor Right Le - no drift  Limb Ataxia: 0 - absent  Sensory: 0 - normal  Best Language: 0 - no aphasia  Dysarthria: 1 - mild to moderate dysarthria  Extinction and Inattention: 0 - no neglect  NIH Stroke Scale Total: 7      Laboratory:  CMP:     Recent Labs  Lab 17   CALCIUM 9.2   ALBUMIN 3.2*   PROT 6.6      K 3.9   CO2 24      BUN 20   CREATININE 1.2   ALKPHOS 158*   ALT 19   AST 24   BILITOT 1.1*     BMP:     Recent Labs  Lab 175      K 3.9      CO2 24   BUN 20   CREATININE 1.2   CALCIUM 9.2     CBC:     Recent Labs  Lab 17   WBC 7.85   RBC 5.11   HGB 13.9*   HCT 41.7      MCV 82   MCH 27.2   MCHC 33.3     Lipid Panel:     Recent Labs  Lab 17  0017   CHOL 133   LDLCALC 70.6   HDL 42   TRIG 102     Coagulation:     Recent Labs  Lab 17  0440   INR 1.0   APTT 21.8     Platelet Aggregation Study: No results for input(s): PLTAGG, PLTAGINTERP, PLTAGREGLACO, ADPPLTAGGREG in the last 168 hours.  Hgb A1C:     Recent Labs  Lab 17  0017   HGBA1C 9.8*     TSH:     Recent Labs  Lab 17  0017   TSH 1.710       Diagnostic Results:  I have personally reviewed:   Findings:     MRI Brain WO contrast (17)  Right thalamic infarct. +cytotoxic cerebral edema.         CT head without contrast (17)  Right thalamic infarct.              Merced Bello PA-C  Comprehensive Stroke Center  Department of Vascular Neurology   [facility]

## 2017-06-28 NOTE — ASSESSMENT & PLAN NOTE
Stroke risk factor  - uncontrolled  HgBA1c 9.8  - detemir 10 unit qam  SSI  - passed MBSS and started on diabetic diet, will follow blood glucose and adjust insulin accordingly  - ambulatory referral to endocrine at discharge

## 2017-06-28 NOTE — PT/OT/SLP PROGRESS
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 : Yes  Keep patient NPO?: No   General Precautions: Standard, aspiration, fall  Current Respiratory Status:  (room air)       Subjective:  Awake/alert    Pain/Comfort  Pain Rating 1: 0/10  Pain Rating Post-Intervention 1: 0/10    Objective:     Pt upright in bed upon entry with spouse and daughter at bedside. Pt tolerated jacqueline cracker x4 and thin liquids via cup x3 without overt s/s of aspiration. SLP reviewed results of MBS and swallow precautions with family. Pt oriented to place and person ind'ly. Complex commands followed with 4/5 accuracy provided min-mod cues. Pt completed convergent categories and cause/effect task with 100% accuracy ind'ly. Word fluency task completed. Pt named 8 items within a category within one minute provided min cues. Pt's spouse stated pt closer to cognitive baseline; however remains confused and lethargic. SLP updated POC.      Assessment:  Emmett Perez is a 84 y.o. 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    Discharge recommendations: Discharge Facility/Level Of Care Needs: rehabilitation facility     Goals:    SLP Goals        Problem: SLP Goal    Goal Priority Disciplines Outcome   SLP Goal     SLP Ongoing (interventions implemented as appropriate)   Description:  Speech Language Pathology Goals  Goals expected to be met  by 7/4    1. Pt will tolerate regular diet/thin liquids without overt s/s of aspiration  2. Pt will follow complex commands with 80% accuracy ONGOING  3. Pt will participate in ongoing assessment of reading,writing and visual spatial aspects    Speech Language Pathology Goals  Goals expected to be met by 6/29    1. Pt will tolerate regular diet/thin liquids without overt s/s of aspiration ONGOING  2. Pt will orient to place and time with mod cues  MET   3. Pt will complete problem solving tasks with 70% accuracy and min cuesMET  4. Pt will follow complex commands with 80% accuracy ONGOING  5. Pt will participate in ongoing assessment of reading,writing and visual spatial aspects.                           Plan:   Patient to be seen Therapy Frequency: 5 x/week   Plan of Care expires: 07/21/17  Plan of Care reviewed with: patient, spouse, daughter  SLP Follow-up?: Yes              Gardenia Yañez CCC-SLP   Speech Language Pathologist  Pager [phone]  06/28/2017

## 2017-06-28 NOTE — PLAN OF CARE
Problem: Physical Therapy Goal  Goal: Physical Therapy Goal  PT goals until 7/8/17    1. Pt supine to sit with SBA-not met  2. Pt sit to supine with SBA-not met  3. Pt sit to stand with RW with CGA-not met  4. Pt to perform gait 100ft with RW with CGA.-not met  5. Pt to transfer bed to/from bedside chair with min A via squat pivot.-not met  6. Pt to up/down 2 steps with no rail with minimal assist.-not met  7. Pt to perform B LE exs in sitting or supine x 15 reps to increase LE strength to improve functional mobility.-not met  8. Pt to perform dynamic sitting EOB x10 minutes with min A.      Outcome: Ongoing (interventions implemented as appropriate)    Pt would benefit from Rehab upon discharge. Pt progressing towards goals.  Appropriate to transfer with: therapy  Lily Kendall PT, DPT  6/28/2017  Pager: [phone]

## 2017-06-28 NOTE — ASSESSMENT & PLAN NOTE
84 yr old male with PMHx of HTN, DMII, HLD, dementia, CKD presenting with left hemiparesis. Imaging positive for right thalamocapsular infarct; lacunar infarct. Likely etiology of stroke is small vessel ischemic disease.    Antithrombotics for secondary stroke prevention: Antiplatelets:  Clopidogrel 75mg qd  Atorvastatin- 40 mg oral daily  VTE Prophylaxis: lovenox 40mg qd  Rehab candidate

## 2017-06-28 NOTE — PROGRESS NOTES
Pt daughter concerned about the multiple loose stools pt has had throughout the day. Requested that he be tested for C. Diff. Noemy with Stroke Team notified of concern. Per orders will collect a stool sample as soon as possible to send off and will put pt on isolation until ruled out. No further orders given. Will continue to monitor.

## 2017-06-28 NOTE — PT/OT/SLP PROGRESS
Occupational Therapy      Emmett Perez  MRN: [id number]    Patient not seen today secondary to pt's daughter declined therapy stating that pt had just fallen asleep after having both PT & SLP and would prefer him to be able to sleep.  OT unable to return at later time to re-attempt session. Will follow-up 6/29/17.    AIXA Murrell  6/28/2017

## 2017-06-28 NOTE — PLAN OF CARE
Du visited with pt's family. Sw informed them [facility] is following pt's progress. Sw will follow.     Kiran Gonzalez, WILBURW   [id number]

## 2017-06-28 NOTE — PLAN OF CARE
06/28/2017      Emmett Perez  [address]  [place] [address]          Vascular Neurology Dept.  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 Asha Mireles from work 6/24/17-6/30/17 due to current hospitalization of her father.          __________________________  Merced Bello PA-C  06/28/2017

## 2017-06-28 NOTE — PLAN OF CARE
Problem: Patient Care Overview  Goal: Individualization & Mutuality  Outcome: Ongoing (interventions implemented as appropriate)  POC reviewed with patient and family. AAOx1, pt oriented to self. VS remained stable throughout the shift. Afebrile. Pt remained free of falls and injuries during the night. Safety precautions remained in place. No new neuro changes. No new skin impairments noted. No c/o pain or nausea/vomiting. Pt on Special Contact precautions for possible C. Diff; stool sample sent off and awaiting results. Continuous telemetry monitoring remained in place. BG checked; no correction insulin needed. Bed locked and low, side rails up x2, with call light in reach and family to remain at bedside. Will continue to monitor.

## 2017-06-29 ENCOUNTER — HOSPITAL ENCOUNTER (INPATIENT)
Facility: HOSPITAL | Age: 82
LOS: 4 days | Discharge: ANOTHER HOSPITAL | DRG: 057 | End: 2017-07-03
Attending: HOSPITALIST | Admitting: HOSPITALIST
Payer: MEDICARE

## 2017-06-29 ENCOUNTER — PATIENT MESSAGE (OUTPATIENT)
Dept: NEUROLOGY | Facility: CLINIC | Age: 82
End: 2017-06-29

## 2017-06-29 VITALS
HEART RATE: 59 BPM | SYSTOLIC BLOOD PRESSURE: 143 MMHG | HEIGHT: 69 IN | DIASTOLIC BLOOD PRESSURE: 74 MMHG | OXYGEN SATURATION: 94 % | BODY MASS INDEX: 27.85 KG/M2 | TEMPERATURE: 98 F | WEIGHT: 188 LBS | RESPIRATION RATE: 15 BRPM

## 2017-06-29 DIAGNOSIS — G81.94 ACUTE LEFT HEMIPARESIS: ICD-10-CM

## 2017-06-29 PROBLEM — R19.7 DIARRHEA: Status: ACTIVE | Noted: 2017-06-29

## 2017-06-29 LAB
ALBUMIN SERPL BCP-MCNC: 3.2 G/DL
ALP SERPL-CCNC: 170 U/L
ALT SERPL W/O P-5'-P-CCNC: 32 U/L
ANION GAP SERPL CALC-SCNC: 10 MMOL/L
AST SERPL-CCNC: 35 U/L
BASOPHILS # BLD AUTO: 0.01 K/UL
BASOPHILS NFR BLD: 0.2 %
BILIRUB SERPL-MCNC: 1 MG/DL
BUN SERPL-MCNC: 23 MG/DL
CALCIUM SERPL-MCNC: 9.3 MG/DL
CHLORIDE SERPL-SCNC: 108 MMOL/L
CO2 SERPL-SCNC: 21 MMOL/L
CREAT SERPL-MCNC: 1.4 MG/DL
DIFFERENTIAL METHOD: ABNORMAL
EOSINOPHIL # BLD AUTO: 0.1 K/UL
EOSINOPHIL NFR BLD: 2 %
ERYTHROCYTE [DISTWIDTH] IN BLOOD BY AUTOMATED COUNT: 12.7 %
EST. GFR  (AFRICAN AMERICAN): 53 ML/MIN/1.73 M^2
EST. GFR  (NON AFRICAN AMERICAN): 45.8 ML/MIN/1.73 M^2
GLUCOSE SERPL-MCNC: 272 MG/DL
HCT VFR BLD AUTO: 40.7 %
HGB BLD-MCNC: 13.8 G/DL
LYMPHOCYTES # BLD AUTO: 1.7 K/UL
LYMPHOCYTES NFR BLD: 28.2 %
MAGNESIUM SERPL-MCNC: 1.8 MG/DL
MCH RBC QN AUTO: 27.4 PG
MCHC RBC AUTO-ENTMCNC: 33.9 %
MCV RBC AUTO: 81 FL
MONOCYTES # BLD AUTO: 0.4 K/UL
MONOCYTES NFR BLD: 6.8 %
NEUTROPHILS # BLD AUTO: 3.9 K/UL
NEUTROPHILS NFR BLD: 62.6 %
PHOSPHATE SERPL-MCNC: 3.3 MG/DL
PLATELET # BLD AUTO: 228 K/UL
PMV BLD AUTO: 10.7 FL
POCT GLUCOSE: 163 MG/DL (ref 70–110)
POCT GLUCOSE: 243 MG/DL (ref 70–110)
POCT GLUCOSE: 248 MG/DL (ref 70–110)
POCT GLUCOSE: 257 MG/DL (ref 70–110)
POTASSIUM SERPL-SCNC: 4.2 MMOL/L
PROT SERPL-MCNC: 6.8 G/DL
RBC # BLD AUTO: 5.04 M/UL
SODIUM SERPL-SCNC: 139 MMOL/L
WBC # BLD AUTO: 6.14 K/UL

## 2017-06-29 PROCEDURE — 25000003 PHARM REV CODE 250: Performed by: NURSE PRACTITIONER

## 2017-06-29 PROCEDURE — 63600175 PHARM REV CODE 636 W HCPCS: Performed by: PHYSICIAN ASSISTANT

## 2017-06-29 PROCEDURE — 97110 THERAPEUTIC EXERCISES: CPT

## 2017-06-29 PROCEDURE — 36415 COLL VENOUS BLD VENIPUNCTURE: CPT

## 2017-06-29 PROCEDURE — 94640 AIRWAY INHALATION TREATMENT: CPT

## 2017-06-29 PROCEDURE — 97112 NEUROMUSCULAR REEDUCATION: CPT

## 2017-06-29 PROCEDURE — 83735 ASSAY OF MAGNESIUM: CPT

## 2017-06-29 PROCEDURE — 97535 SELF CARE MNGMENT TRAINING: CPT

## 2017-06-29 PROCEDURE — 63600175 PHARM REV CODE 636 W HCPCS: Performed by: HOSPITALIST

## 2017-06-29 PROCEDURE — 80053 COMPREHEN METABOLIC PANEL: CPT

## 2017-06-29 PROCEDURE — 63600175 PHARM REV CODE 636 W HCPCS: Performed by: NURSE PRACTITIONER

## 2017-06-29 PROCEDURE — 25000242 PHARM REV CODE 250 ALT 637 W/ HCPCS: Performed by: PSYCHIATRY & NEUROLOGY

## 2017-06-29 PROCEDURE — 85025 COMPLETE CBC W/AUTO DIFF WBC: CPT

## 2017-06-29 PROCEDURE — 99239 HOSP IP/OBS DSCHRG MGMT >30: CPT | Mod: ,,, | Performed by: PSYCHIATRY & NEUROLOGY

## 2017-06-29 PROCEDURE — 25000003 PHARM REV CODE 250: Performed by: PHYSICIAN ASSISTANT

## 2017-06-29 PROCEDURE — 86580 TB INTRADERMAL TEST: CPT | Performed by: HOSPITALIST

## 2017-06-29 PROCEDURE — 12000000 HC SNF SEMI-PRIVATE ROOM

## 2017-06-29 PROCEDURE — 25000003 PHARM REV CODE 250: Performed by: PSYCHIATRY & NEUROLOGY

## 2017-06-29 PROCEDURE — 99900035 HC TECH TIME PER 15 MIN (STAT)

## 2017-06-29 PROCEDURE — 97530 THERAPEUTIC ACTIVITIES: CPT

## 2017-06-29 PROCEDURE — 94664 DEMO&/EVAL PT USE INHALER: CPT

## 2017-06-29 PROCEDURE — 84100 ASSAY OF PHOSPHORUS: CPT

## 2017-06-29 RX ORDER — AMOXICILLIN 250 MG
1 CAPSULE ORAL 2 TIMES DAILY
Status: DISCONTINUED | OUTPATIENT
Start: 2017-06-29 | End: 2017-06-29

## 2017-06-29 RX ORDER — POLYETHYLENE GLYCOL 3350 17 G/17G
17 POWDER, FOR SOLUTION ORAL DAILY PRN
Status: DISCONTINUED | OUTPATIENT
Start: 2017-06-29 | End: 2017-07-04 | Stop reason: HOSPADM

## 2017-06-29 RX ORDER — MEMANTINE HYDROCHLORIDE 10 MG/1
10 TABLET ORAL 2 TIMES DAILY
Status: CANCELLED | OUTPATIENT
Start: 2017-06-29

## 2017-06-29 RX ORDER — DONEPEZIL HYDROCHLORIDE 5 MG/1
10 TABLET, FILM COATED ORAL DAILY
Status: CANCELLED | OUTPATIENT
Start: 2017-06-30

## 2017-06-29 RX ORDER — INSULIN ASPART 100 [IU]/ML
3 INJECTION, SOLUTION INTRAVENOUS; SUBCUTANEOUS
Status: DISCONTINUED | OUTPATIENT
Start: 2017-06-29 | End: 2017-06-29 | Stop reason: HOSPADM

## 2017-06-29 RX ORDER — RAMELTEON 8 MG/1
8 TABLET ORAL NIGHTLY PRN
Status: DISCONTINUED | OUTPATIENT
Start: 2017-06-29 | End: 2017-07-04 | Stop reason: HOSPADM

## 2017-06-29 RX ORDER — MAG HYDROX/ALUMINUM HYD/SIMETH 200-200-20
15 SUSPENSION, ORAL (FINAL DOSE FORM) ORAL EVERY 6 HOURS PRN
Status: CANCELLED | OUTPATIENT
Start: 2017-06-29

## 2017-06-29 RX ORDER — METOPROLOL SUCCINATE 25 MG/1
25 TABLET, EXTENDED RELEASE ORAL DAILY
Status: CANCELLED | OUTPATIENT
Start: 2017-06-30

## 2017-06-29 RX ORDER — CLOPIDOGREL BISULFATE 75 MG/1
75 TABLET ORAL DAILY
Status: CANCELLED | OUTPATIENT
Start: 2017-06-30

## 2017-06-29 RX ORDER — INSULIN ASPART 100 [IU]/ML
0-5 INJECTION, SOLUTION INTRAVENOUS; SUBCUTANEOUS
Status: DISCONTINUED | OUTPATIENT
Start: 2017-06-29 | End: 2017-07-04 | Stop reason: HOSPADM

## 2017-06-29 RX ORDER — LOSARTAN POTASSIUM 25 MG/1
25 TABLET ORAL DAILY
Status: CANCELLED | OUTPATIENT
Start: 2017-06-30

## 2017-06-29 RX ORDER — POLYETHYLENE GLYCOL 3350 17 G/17G
17 POWDER, FOR SOLUTION ORAL DAILY PRN
Status: CANCELLED | OUTPATIENT
Start: 2017-06-29

## 2017-06-29 RX ORDER — AMLODIPINE BESYLATE 10 MG/1
10 TABLET ORAL DAILY
Status: DISCONTINUED | OUTPATIENT
Start: 2017-06-30 | End: 2017-07-04 | Stop reason: HOSPADM

## 2017-06-29 RX ORDER — DONEPEZIL HYDROCHLORIDE 10 MG/1
10 TABLET, FILM COATED ORAL DAILY
Status: DISCONTINUED | OUTPATIENT
Start: 2017-06-30 | End: 2017-07-04 | Stop reason: HOSPADM

## 2017-06-29 RX ORDER — BENZONATATE 100 MG/1
100 CAPSULE ORAL 3 TIMES DAILY
Status: CANCELLED | OUTPATIENT
Start: 2017-06-29

## 2017-06-29 RX ORDER — ACETAMINOPHEN 325 MG/1
650 TABLET ORAL EVERY 6 HOURS PRN
Status: DISCONTINUED | OUTPATIENT
Start: 2017-06-29 | End: 2017-07-04 | Stop reason: HOSPADM

## 2017-06-29 RX ORDER — INSULIN ASPART 100 [IU]/ML
0-5 INJECTION, SOLUTION INTRAVENOUS; SUBCUTANEOUS
Status: CANCELLED | OUTPATIENT
Start: 2017-06-29

## 2017-06-29 RX ORDER — ATORVASTATIN CALCIUM 20 MG/1
40 TABLET, FILM COATED ORAL DAILY
Status: CANCELLED | OUTPATIENT
Start: 2017-06-30

## 2017-06-29 RX ORDER — ACETAMINOPHEN 325 MG/1
650 TABLET ORAL EVERY 6 HOURS PRN
Status: CANCELLED | OUTPATIENT
Start: 2017-06-29

## 2017-06-29 RX ORDER — LOSARTAN POTASSIUM 25 MG/1
25 TABLET ORAL DAILY
Status: DISCONTINUED | OUTPATIENT
Start: 2017-06-30 | End: 2017-07-04 | Stop reason: HOSPADM

## 2017-06-29 RX ORDER — AMOXICILLIN 250 MG
1 CAPSULE ORAL 2 TIMES DAILY
Status: CANCELLED | OUTPATIENT
Start: 2017-06-29

## 2017-06-29 RX ORDER — AMLODIPINE BESYLATE 10 MG/1
10 TABLET ORAL DAILY
Status: CANCELLED | OUTPATIENT
Start: 2017-06-30

## 2017-06-29 RX ORDER — INSULIN ASPART 100 [IU]/ML
3 INJECTION, SOLUTION INTRAVENOUS; SUBCUTANEOUS
Status: CANCELLED | OUTPATIENT
Start: 2017-06-29

## 2017-06-29 RX ORDER — INSULIN ASPART 100 [IU]/ML
3 INJECTION, SOLUTION INTRAVENOUS; SUBCUTANEOUS
Status: DISCONTINUED | OUTPATIENT
Start: 2017-06-29 | End: 2017-07-01

## 2017-06-29 RX ORDER — AMOXICILLIN 250 MG
1 CAPSULE ORAL 2 TIMES DAILY
Status: DISCONTINUED | OUTPATIENT
Start: 2017-06-29 | End: 2017-07-01

## 2017-06-29 RX ORDER — CLOPIDOGREL BISULFATE 75 MG/1
75 TABLET ORAL DAILY
Status: DISCONTINUED | OUTPATIENT
Start: 2017-06-30 | End: 2017-07-04 | Stop reason: HOSPADM

## 2017-06-29 RX ORDER — ATORVASTATIN CALCIUM 20 MG/1
40 TABLET, FILM COATED ORAL DAILY
Status: DISCONTINUED | OUTPATIENT
Start: 2017-06-30 | End: 2017-07-04 | Stop reason: HOSPADM

## 2017-06-29 RX ORDER — MAG HYDROX/ALUMINUM HYD/SIMETH 200-200-20
15 SUSPENSION, ORAL (FINAL DOSE FORM) ORAL EVERY 6 HOURS PRN
Status: DISCONTINUED | OUTPATIENT
Start: 2017-06-29 | End: 2017-07-04 | Stop reason: HOSPADM

## 2017-06-29 RX ORDER — BENZONATATE 100 MG/1
100 CAPSULE ORAL 3 TIMES DAILY
Status: DISCONTINUED | OUTPATIENT
Start: 2017-06-29 | End: 2017-07-01

## 2017-06-29 RX ORDER — MEMANTINE HYDROCHLORIDE 10 MG/1
10 TABLET ORAL 2 TIMES DAILY
Status: DISCONTINUED | OUTPATIENT
Start: 2017-06-29 | End: 2017-07-04 | Stop reason: HOSPADM

## 2017-06-29 RX ORDER — RAMELTEON 8 MG/1
8 TABLET ORAL NIGHTLY PRN
Status: CANCELLED | OUTPATIENT
Start: 2017-06-29

## 2017-06-29 RX ORDER — METOPROLOL SUCCINATE 25 MG/1
25 TABLET, EXTENDED RELEASE ORAL DAILY
Status: DISCONTINUED | OUTPATIENT
Start: 2017-06-30 | End: 2017-07-04 | Stop reason: HOSPADM

## 2017-06-29 RX ADMIN — BENZONATATE 100 MG: 100 CAPSULE ORAL at 09:06

## 2017-06-29 RX ADMIN — INSULIN ASPART 3 UNITS: 100 INJECTION, SOLUTION INTRAVENOUS; SUBCUTANEOUS at 05:06

## 2017-06-29 RX ADMIN — MEMANTINE HYDROCHLORIDE 10 MG: 10 TABLET ORAL at 09:06

## 2017-06-29 RX ADMIN — CLOPIDOGREL 75 MG: 75 TABLET, FILM COATED ORAL at 09:06

## 2017-06-29 RX ADMIN — METOPROLOL SUCCINATE 25 MG: 25 TABLET, EXTENDED RELEASE ORAL at 09:06

## 2017-06-29 RX ADMIN — IPRATROPIUM BROMIDE AND ALBUTEROL SULFATE 3 ML: .5; 3 SOLUTION RESPIRATORY (INHALATION) at 09:06

## 2017-06-29 RX ADMIN — INSULIN ASPART 2 UNITS: 100 INJECTION, SOLUTION INTRAVENOUS; SUBCUTANEOUS at 05:06

## 2017-06-29 RX ADMIN — DONEPEZIL HYDROCHLORIDE 10 MG: 5 TABLET, FILM COATED ORAL at 09:06

## 2017-06-29 RX ADMIN — STANDARDIZED SENNA CONCENTRATE AND DOCUSATE SODIUM 1 TABLET: 8.6; 5 TABLET, FILM COATED ORAL at 09:06

## 2017-06-29 RX ADMIN — Medication 5 UNITS: at 10:06

## 2017-06-29 RX ADMIN — LOSARTAN POTASSIUM 25 MG: 25 TABLET, FILM COATED ORAL at 09:06

## 2017-06-29 RX ADMIN — Medication 3 ML: at 01:06

## 2017-06-29 RX ADMIN — INSULIN ASPART 3 UNITS: 100 INJECTION, SOLUTION INTRAVENOUS; SUBCUTANEOUS at 01:06

## 2017-06-29 RX ADMIN — Medication 3 ML: at 06:06

## 2017-06-29 RX ADMIN — MEMANTINE HYDROCHLORIDE 10 MG: 10 TABLET, FILM COATED ORAL at 09:06

## 2017-06-29 RX ADMIN — ATORVASTATIN CALCIUM 40 MG: 20 TABLET, FILM COATED ORAL at 09:06

## 2017-06-29 RX ADMIN — AMLODIPINE BESYLATE 10 MG: 10 TABLET ORAL at 09:06

## 2017-06-29 RX ADMIN — IPRATROPIUM BROMIDE AND ALBUTEROL SULFATE 3 ML: .5; 3 SOLUTION RESPIRATORY (INHALATION) at 02:06

## 2017-06-29 RX ADMIN — INSULIN ASPART 2 UNITS: 100 INJECTION, SOLUTION INTRAVENOUS; SUBCUTANEOUS at 12:06

## 2017-06-29 RX ADMIN — BENZONATATE 100 MG: 100 CAPSULE ORAL at 01:06

## 2017-06-29 RX ADMIN — INSULIN ASPART 3 UNITS: 100 INJECTION, SOLUTION INTRAVENOUS; SUBCUTANEOUS at 09:06

## 2017-06-29 NOTE — DISCHARGE SUMMARY
[facility]  Vascular Neurology  Comprehensive Stroke Center  Discharge Summary     Summary:     Admit Date: 6/22/2017 12:06 AM    Discharge Date and Time: No discharge date for patient encounter.    Attending Physician: Chayo Barragan MD     Discharge Provider: Merced Bello PA-C    History of Present Illness: 85 y/o male who was sitting in chair while family was at work. Around 1900 he started to c/o being very tired so took a nap. At 2000 he was checked up on and still c/o being tired and generalized weak but weaker on the left. Around 2200 family noticed facial droop and drooling so she decided to bring to ED to be evaluated. Patient has baseline dementia so unable to answer question correctly.   Patient walks with a cane due to DPN.   Upon evaluation patient states both his legs feel heavy and arm but the left side is weaker than the right. Facial droop on left present  Risk factors HTN, DM, CKD    Hospital Course (synopsis of major diagnoses, care, treatment, and services provided during the course of the hospital stay): 85 y/o male with PMHx of HTN, DM, HLD, Dementia, CKD, and CHF who presented with left sided weakness and facial droop. Patient was not candidate for TPA candidate as out of window and not an IR candidate as minimal symptoms. MRI confirmed R thalamic infarct likely caused by small vessel disease. Echo revealed an EF of 43% with LV hypokinesis, but no LA enlargement.    Patient passed MBSS. He was approved for a regular diet and thin liquids with crushed meds and no straws. Patient with A1C of 9.8, but had low caloric intake. Insulin was adjusted accordingly and SSI was ordered. Daughter expressed concern about multiple issues including diabetes, lethargy, dementia diagnosis, caloric intake, and diarrhea. Ordered calorie counts and stool studies. Stool was negative for C. Diff, e coli. Stool culture with no growth.  Ambulatory referral sent to neuropsych and endocrine  for outpatient follow up. Explained to the patient's daughter that lethargy is common post stroke and recommended stimulating patient. Nurse was instructed to transfer patient into the chair throughout the day.    Patients lethargy improved and he was more participatory with therapy. He was accepted to [facility]. He tolerated a regular diet, but only consumed about 25% of his meals. Boost was added to his dietary regimen to increase his caloric intake. Patient remained neurologically stable and at discharge he was experiencing LSW and dysarthria. For secondary stroke prevention, he will continue plavix and atorvastatin 40. He will resume all other home medications. He will be discharged to [facility] and will follow up in stroke clinic in 4-6 weeks.          NIH Stroke Scale:  Interval: 7 days or at discharge (whichever comes first)  Level of Consciousness: 0 - alert  LOC Questions: 1 - answers one correctly (at cognitive baseline from dementia)  LOC Commands: 0 - performs both correctly  Best Gaze: 0 - normal  Visual: 0 - no visual loss  Facial Palsy: 2 - partial  Motor Left Arm: 1 - drift  Motor Right Arm: 0 - no drift  Motor Left Le - can't resist gravity  Motor Right Le - no drift  Limb Ataxia: 0 - absent  Sensory: 0 - normal  Best Language: 0 - no aphasia  Dysarthria: 1 - mild to moderate dysarthria  Extinction and Inattention: 0 - no neglect  NIH Stroke Scale Total: 7  Modified Nelson Scale:   Timeline: At discharge  Modified Nelson Score: 3 - moderate disability            Assessment/Plan:     Interventions: None    Complications: None    Research Candidate?:  No    Neurological deficit at discharge: Weakness: left, Dysarthria     Disposition: Skilled Nursing Facility    Final Active Diagnoses:    Diagnosis Date Noted POA    PRINCIPAL PROBLEM:  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 [I63.331] 2017 Yes    Diarrhea [R19.7] 2017 Unknown    Encephalopathy [G93.40]  06/25/2017 No    Essential hypertension [I10] 06/22/2017 Yes    Hemiparesis, left [G81.94] 06/22/2017 Yes    Facial droop [R29.810] 06/22/2017 Yes    Cerebral microvascular disease [I67.9] 06/22/2017 Yes    Combined systolic and diastolic cardiac dysfunction [I51.89] 06/22/2017 Yes    Cytotoxic cerebral edema [G93.6] 06/22/2017 Yes    Type 2 diabetes mellitus with diabetic polyneuropathy, with long-term current use of insulin [E11.42, Z79.4] 11/28/2016 Not Applicable    Dementia without behavioral disturbance [F03.90] 11/28/2016 Yes    Chronic kidney disease, stage III (moderate) [N18.3] 07/30/2013 Yes     Chronic      Problems Resolved During this Admission:    Diagnosis Date Noted Date Resolved POA     Diarrhea    C. diff negative  E. Coli negative  Stool cultures NGTD          Encephalopathy    - likely due to underlying dementia, possibly hospital acquired delirium   - labs are unremarkable overall; infectious workup is negative.   - delirium precautions  - cont to monitor        Cytotoxic cerebral edema    2/2 stroke  Noted on imaging        Combined systolic and diastolic cardiac dysfunction    - ECHO with 35% EF; +diastolic dysfunction  - losartan  - toprol xl 25 mg daily        Cerebral microvascular disease    Likely etiology of stroke  - plan as above        Facial droop    2/2 stroke        Hemiparesis, left    2/2 stroke  PT/OT recommending rehab        Essential hypertension    Stroke risk factor  SBP <160  Losartan, amlodipine        Dementia without behavioral disturbance    - cont donepezil and memantine  - ambulatory referral to neuropsych        Type 2 diabetes mellitus with diabetic polyneuropathy, with long-term current use of insulin    Stroke risk factor  - uncontrolled  HgBA1c 9.8  - detemir 15 unit qam, aspart 3 TID with meals  SSI  - passed MBSS and started on diabetic diet, will follow blood glucose and adjust insulin accordingly  - ambulatory referral to endocrine at discharge         Chronic kidney disease, stage III (moderate)    Baseline Cr 1.3-1.9  stable        * 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 Imaging positive for right thalamocapsular infarct; lacunar infarct. Likely etiology of stroke is small vessel ischemic disease.    Antithrombotics for secondary stroke prevention: Antiplatelets:  Clopidogrel 75mg qd  Atorvastatin- 40 mg oral daily  VTE Prophylaxis: lovenox 40mg qd  Rehab candidate             Recommendations:     Post-discharge complication risks: Falls, Skin breakdown    Stroke Education given to: patient and family    Follow-up in Stroke Clinic in 4-6 weeks    Discharge Plan:  Antithrombotics: Clopidogrel 75mg  Statin: Atorvastatin 40mg  Aggresive risk factor modification:  Hypertension, Diabetes and High Cholesterol    Follow Up:  Follow-up Information     Staci French PA-C On 7/27/2017.    Specialty:  Neurology  Why:  @ 10:40  Contact information:  [address] [place] [id number]  [phone]                 Patient Instructions:     Ambulatory Referral to Endocrinology   Referral Priority: Routine Referral Type: Consultation   Requested Specialty: Endocrinology    Number of Visits Requested: 1      Ambulatory consult to Neuropsychology   Referral Priority: Routine Referral Type: Psychiatric   Referral Reason: Specialty Services Required    Requested Specialty: Psychology    Number of Visits Requested: 1        Medications:  Reconciled Home Medications:   Current Discharge Medication List      CONTINUE these medications which have NOT CHANGED    Details   amlodipine (NORVASC) 10 MG tablet Take 10 mg by mouth once daily.      aspirin 81 MG Chew Take 81 mg by mouth once daily.      atorvastatin (LIPITOR) 40 MG tablet Take 1 tablet (40 mg total) by mouth once daily.  Qty: 30 tablet, Refills: 0      blood sugar diagnostic Strp Monitors blood sugar  "before meals and bedtime; disp with lancets; One Touch Verio glucometer  Qty: 150 each, Refills: 12      donepezil (ARICEPT) 10 MG tablet Take 10 mg by mouth once daily.      insulin aspart (NOVOLOG FLEXPEN) 100 unit/mL InPn pen Inject 14 Units into the skin 3 (three) times daily with meals.  Qty: 1 Box, Refills: 0      insulin degludec (TRESIBA FLEXTOUCH U-200) 200 unit/mL (3 mL) InPn Inject 80 Units into the skin once daily.  Qty: 3 Syringe, Refills: 2      lisinopril (PRINIVIL,ZESTRIL) 5 MG tablet Take 1 tablet (5 mg total) by mouth once daily.  Qty: 30 tablet, Refills: 0      memantine (NAMENDA) 10 MG Tab Take 10 mg by mouth 2 (two) times daily.      metoprolol succinate (TOPROL-XL) 25 MG 24 hr tablet Take 1 tablet (25 mg total) by mouth once daily.  Qty: 30 tablet, Refills: 0      pen needle, diabetic (BD ULTRA-FINE TARYN PEN NEEDLES) 32 gauge x 5/32" Ndle Uses 5 daily, on multiple daily insulin injections  Qty: 150 each, Refills: 12                   Mercedblanka Bello PA-C  Comprehensive Stroke Center  Department of Vascular Neurology   [facility]   "

## 2017-06-29 NOTE — PT/OT/SLP PROGRESS
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  Patient: "I used to be an  but now I don't have to know what day it is"  Pain/Comfort  Pain Rating 1: 0/10  Pain Rating Post-Intervention 1: 0/10    Objective:  Patient found with: telemetry, SCD   Wife and daughter present as well as PCT     Functional Mobility:  Bed Mobility:  Rolling/Turning to Left: Moderate assistance  Scooting/Bridging: Maximum Assistance (to EOB)  Supine to Sit: Maximum Assistance    Transfers:   Bed <> Chair Transfer Assistance: Moderate Assistance  Bed <> Chair Assistive Device: No Assistive Device  Chair <>Mat Technique: Squat Pivot      Activities of Daily Living:  UE Dressing Level of Assistance: Maximum assistance (while sitting EOB)  Grooming Position: EOB  Grooming Level of Assistance: Contact guard assistance  Toileting Where Assessed: Bed level  Toileting Level of Assistance: Total assistance with PCT and OT; during toileting activity, daughter requested to take pictures of scrotum and sacral areas to document skin in these areas.    Therapeutic Activities and Exercises:  Patient alert majority of session (90 percent) and oriented x2. Patient needed CGA to min assistance to maintain sitting balance EOB. AAROM to PROM was performed LUE (10 reps each) in all available planes of motion with stretches provided at end range. LUE weightbearing was addressed while patient performed " "transfers and participated in ADLs while seated EOB.  Patient education provided on OT POC. Patient and family verbalized understanding of education. Family also educated on using a coban wrapped towel to promote finger flexion and extension strengthening with LUE while patient participates in acupella therapy exercise only so as to not over promote finger flexion over extension strengthening. Continued education, patient/family training recommended. Patient's functional status and disposition recommendation discussed with stroke team in daily rounds. OT asked if patient or family had further questions and no further questions were asked.    AM-PAC 6 CLICK ADL   How much help from another person does this patient currently need?   1 = Unable, Total/Dependent Assistance  2 = A lot, Maximum/Moderate Assistance  3 = A little, Minimum/Contact Guard/Supervision  4 = None, Modified [place]/Independent    Putting on and taking off regular lower body clothing? : 2  Bathing (including washing, rinsing, drying)?: 1  Toileting, which includes using toilet, bedpan, or urinal? : 1  Putting on and taking off regular upper body clothing?: 2  Taking care of personal grooming such as brushing teeth?: 3  Eating meals?: 3  Total Score: 12     AM-PAC Raw Score CMS "G-Code Modifier Level of Impairment Assistance   6 % Total / Unable   7 - 8 CM 80 - 100% Maximal Assist   9-13 CL 60 - 80% Moderate Assist   14 - 19 CK 40 - 60% Moderate Assist   20 - 22 CJ 20 - 40% Minimal Assist   23 CI 1-20% SBA / CGA   24 CH 0% Independent/ Mod I       Patient left up in chair with all lines intact, call button in reach, RN and PTA notified, Family present and whiteboard    ASSESSMENT:  Emmett Perez is a 84 y.o. 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 Additionally patient demonstrates performance deficits in the cognitive domain with impaired memory, orientation, attention, perception, problem solving, sequencing. The above mentioned deficits have limited this patient to participate in activities that include but are not limited to: (bed mobility, transfers, ascending/descending stairs, having functional mobility in both short and long distances, walking, walking around obstacles, performing transitional movement patterns (kneeling, bending); performing activities of daily living such as: (eating, dressing, grooming, toileting, bathing, and other self-care activities). Additionally, patient is unable to participate in activities such as (reading, driving, typing, writing, balancing a checkbook, cooking, cleaning, shopping, caring for others). Patient's roles and responsibilities as father,  and independent caretaker of self have also been affected. Patient will benefit from skilled OT services to maximize level of independence in above-mentioned activities. Patient demonstrated improvement in alertness and motivation to participate in therapy. Patient also improved in transfers, bed mobility, and ADL participation and performance.     Rehab identified problem list/impairments: Rehab identified problem list/impairments: weakness, impaired endurance, impaired sensation, impaired self care skills, gait instability, impaired functional mobilty, impaired balance, impaired cognition, decreased coordination, decreased upper extremity function, decreased lower extremity function, decreased safety awareness, abnormal tone, decreased ROM, impaired coordination, impaired fine motor    Rehab potential is good.    Activity tolerance: Good    Discharge recommendations: Discharge Facility/Level Of Care Needs: rehabilitation facility     Barriers to discharge: Barriers to Discharge: None    Equipment recommendations:  (TBD)      GOALS:    Occupational Therapy Goals        Problem: Occupational Therapy Goal    Goal Priority Disciplines Outcome Interventions   Occupational Therapy Goal     OT, PT/OT Ongoing (interventions implemented as appropriate)    Description:  Goals to be met by 7/6/2017:    1.  Sit eob with CG A for dynamic sitting balance during grooming tasks  2.  CG A to complete grooming tasks in supported sitting  3.  Complete UB dressing with min a  4.  Don shorts/underpants or pants with min a  5.  Toilet self with min a  6.  Toilet or BSC transfer with min a   7.  Supine to sit with minimal (A)  8.  Rolling to (B) directions with Stand by assistance                    Plan:  Patient to be seen 6 x/week to address the above listed problems via self-care/home management, community/work re-entry, therapeutic activities, therapeutic exercises, neuromuscular re-education, cognitive retraining, sensory integration  Plan of Care expires: 07/21/17  Plan of Care reviewed with: patient, family         RAN Pulliam  06/29/2017

## 2017-06-29 NOTE — PROGRESS NOTES
[facility]  Vascular Neurology  Comprehensive Stroke Center  Progress Note    Assessment/Plan:     83 y/o male with left sided weakness and facial droop. Stroke W/U in progress. Not TPA candidate as out of window, not IR as minimal symptoms.     06/27/17 passed MBSS with crushed meds and no straw-regular diet and thin liquids. Will adjust insulin based on dietary intake. Daughter concerned about multiple issues including diabetes, lethargy, dementia diagnosis, PO intake, and diarrhea. Ordered calorie counts and stool culture. Patient will need neuropsych and endocrine outpatient follow up at discharge. Explained to the patient's daughter that lethargy is common post stroke and recommended stimulating patient. Nurse was instructed to try and get patient into the chair as often as possible.    06/28/17 Patient with low PO intake, currently eating about 25% of his meals, ordering ensure supplementation, stool cultures pending for diarrhea, patient on contact precautions    06/29/17 Increased to detemir 15 units and added aspart 3 units, patient is more alert and working with therapy-inpatient rehab is recommended at discharge    Diarrhea    C. diff negative  E. Coli negative  Stool cultures NGTD          Encephalopathy    - likely due to underlying dementia, possibly hospital acquired delirium   - labs are unremarkable overall; infectious workup is negative.   - delirium precautions  - cont to monitor        Cytotoxic cerebral edema    2/2 stroke  Noted on imaging        Combined systolic and diastolic cardiac dysfunction    - ECHO with 35% EF; +diastolic dysfunction  - losartan  - toprol xl 25 mg daily        Cerebral microvascular disease    Likely etiology of stroke  - plan as above        Facial droop    2/2 stroke        Hemiparesis, left    2/2 stroke  PT/OT recommending rehab        Essential hypertension    Stroke risk factor  SBP <160  Losartan, amlodipine        Dementia without behavioral  disturbance    - cont donepezil and memantine  - ambulatory referral to neuropsych        Type 2 diabetes mellitus with diabetic polyneuropathy, with long-term current use of insulin    Stroke risk factor  - uncontrolled  HgBA1c 9.8  - detemir 15 unit qam, aspart 3 TID with meals  SSI  - passed MBSS and started on diabetic diet, will follow blood glucose and adjust insulin accordingly  - ambulatory referral to endocrine at discharge        Chronic kidney disease, stage III (moderate)    Baseline Cr 1.3-1.9  stable        * 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 Imaging positive for right thalamocapsular infarct; lacunar infarct. Likely etiology of stroke is small vessel ischemic disease.    Antithrombotics for secondary stroke prevention: Antiplatelets:  Clopidogrel 75mg qd  Atorvastatin- 40 mg oral daily  VTE Prophylaxis: lovenox 40mg qd  Rehab candidate             Neurologic Chief Complaint: left sided weakness    Subjective:     Interval History: Patient is seen for follow-up neurological assessment and treatment recommendations:   EDIN. Patient has no new complaints, working with therapy    HPI, Past Medical, Family, and Social History remains the same as documented in the initial encounter.     Review of Systems   Constitutional: Negative for fatigue and fever.   Respiratory: Negative for shortness of breath.    Cardiovascular: Negative for chest pain.   Gastrointestinal: Negative for diarrhea, nausea and vomiting.   Neurological: Positive for weakness.     Scheduled Meds:   albuterol-ipratropium 2.5mg-0.5mg/3mL  3 mL Nebulization Q6H    amlodipine  10 mg Oral Daily    atorvastatin  40 mg Oral Daily    benzonatate  100 mg Oral TID    clopidogrel  75 mg Oral Daily    donepezil  10 mg Oral Daily    enoxaparin  40 mg Subcutaneous Daily    insulin aspart  3 Units Subcutaneous TIDWM     [START ON 6/30/2017] insulin detemir  15 Units Subcutaneous Daily    losartan  25 mg Oral Daily    memantine  10 mg Oral BID    metoprolol succinate  25 mg Oral Daily    polyethylene glycol  17 g Oral Once    senna-docusate 8.6-50 mg  1 tablet Oral BID    sodium chloride 0.9%  3 mL Intravenous Q8H     Continuous Infusions:   sodium chloride 0.9% Stopped (06/28/17 0701)    sodium chloride 0.9%       PRN Meds:acetaminophen, dextrose 50%, dextrose 50%, glucagon (human recombinant), glucose, glucose, insulin aspart, labetalol, ondansetron, polyethylene glycol, sodium chloride 0.9%    Objective:     Vital Signs (Most Recent):  Temp: 97.8 °F (36.6 °C) (06/29/17 0800)  Pulse: 68 (06/29/17 0931)  Resp: 15 (06/29/17 0931)  BP: (!) 150/76 (06/29/17 0800)  SpO2: 97 % (06/29/17 0931)  BP Location: Right arm    Vital Signs Range (Last 24H):  Temp:  [97.2 °F (36.2 °C)-97.8 °F (36.6 °C)]   Pulse:  [52-78]   Resp:  [15-16]   BP: (103-161)/(68-79)   SpO2:  [93 %-100 %]   BP Location: Right arm    Physical Exam   Constitutional: He appears well-developed and well-nourished.   HENT:   Head: Normocephalic and atraumatic.   Eyes: Pupils are equal, round, and reactive to light.   Cardiovascular: Normal rate.    Pulmonary/Chest: Effort normal. No respiratory distress.   Neurological: He is alert.   Skin: Skin is warm and dry.   Vitals reviewed.      Neurological Exam:   LOC: alert and follows requests  Language: No aphasia  Speech: Dysarthria  Orientation: Person, Not oriented to time  Visual Fields (CN II): Full  EOM (CN III, IV, VI): Full/intact  Pupils (CN III, IV, VI): PERRL  Facial Sensation (CN V): Symmetric  Facial Movement (CN VII): lower weakness left lower  Shoulder/Neck (CN XI): SCM-Left: Normal ; SCM-Right: Normal ; Shoulder Shrug: Normal/Symetric  Tongue (CN XII): to midline  Motor*: Arm Left:  Paretic:  4/5, Leg Left:   Paretic:  3/5, Arm Right:   Normal (5/5), Leg Right:   Normal (5/5)  Cerebellar*: Normal  limb  Sensation: intact to light touch, temperature and vibration  Tone: Arm-Left: normal; Leg-Left: normal; Arm-Right: normal; Leg-Right: normal    NIH Stroke Scale:    Level of Consciousness: 0 - alert  LOC Questions: 1 - answers one correctly (at cognitive baseline from dementia)  LOC Commands: 0 - performs both correctly  Best Gaze: 0 - normal  Visual: 0 - no visual loss  Facial Palsy: 2 - partial  Motor Left Arm: 1 - drift  Motor Right Arm: 0 - no drift  Motor Left Le - can't resist gravity  Motor Right Le - no drift  Limb Ataxia: 0 - absent  Sensory: 0 - normal  Best Language: 0 - no aphasia  Dysarthria: 1 - mild to moderate dysarthria  Extinction and Inattention: 0 - no neglect  NIH Stroke Scale Total: 7      Laboratory:  CMP:     Recent Labs  Lab 17  0405   CALCIUM 9.3   ALBUMIN 3.2*   PROT 6.8      K 4.2   CO2 21*      BUN 23   CREATININE 1.4   ALKPHOS 170*   ALT 32   AST 35   BILITOT 1.0     BMP:     Recent Labs  Lab 17  0405      K 4.2      CO2 21*   BUN 23   CREATININE 1.4   CALCIUM 9.3     CBC:     Recent Labs  Lab 17  0405   WBC 6.14   RBC 5.04   HGB 13.8*   HCT 40.7      MCV 81*   MCH 27.4   MCHC 33.9     Lipid Panel:   No results for input(s): CHOL, LDLCALC, HDL, TRIG in the last 168 hours.  Coagulation:   No results for input(s): INR, APTT in the last 168 hours.    Invalid input(s): PT  Platelet Aggregation Study: No results for input(s): PLTAGG, PLTAGINTERP, PLTAGREGLACO, ADPPLTAGGREG in the last 168 hours.  Hgb A1C:   No results for input(s): HGBA1C in the last 168 hours.  TSH:   No results for input(s): TSH in the last 168 hours.    Diagnostic Results:  I have personally reviewed:   Findings:     MRI Brain WO contrast (17)  Right thalamic infarct. +cytotoxic cerebral edema.         CT head without contrast (17)  Right thalamic infarct.                Merced Bello PAStormyC  [facility] Stroke [place]  Department of Vascular  Neurology   [facility]

## 2017-06-29 NOTE — PLAN OF CARE
Problem: Physical Therapy Goal  Goal: Physical Therapy Goal  PT goals until 7/8/17    1. Pt supine to sit with SBA-not met  2. Pt sit to supine with SBA-not met  3. Pt sit to stand with RW with CGA-not met  4. Pt to perform gait 100ft with RW with CGA.-not met  5. Pt to transfer bed to/from bedside chair with min A via squat pivot.-not met  6. Pt to up/down 2 steps with no rail with minimal assist.-not met  7. Pt to perform B LE exs in sitting or supine x 15 reps to increase LE strength to improve functional mobility.-not met  8. Pt to perform dynamic sitting EOB x10 minutes with min A.       Outcome: Ongoing (interventions implemented as appropriate)      Goals remain appropriate   Meghna Brownlee SPTA  6/29/2017.

## 2017-06-29 NOTE — PT/OT/SLP DISCHARGE
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  Please refer to prior PT noted date on 6/29/17 for functional status.     Assessment:   Patient appropriate for care in another setting.  GOALS:    Physical Therapy Goals        Problem: Physical Therapy Goal    Goal Priority Disciplines Outcome Goal Variances Interventions   Physical Therapy Goal     PT/OT, PT Ongoing (interventions implemented as appropriate)     Description:  PT goals until 7/8/17    1. Pt supine to sit with SBA-not met  2. Pt sit to supine with SBA-not met  3. Pt sit to stand with RW with CGA-not met  4. Pt to perform gait 100ft with RW with CGA.-not met  5. Pt to transfer bed to/from bedside chair with min A via squat pivot.-not met  6. Pt to up/down 2 steps with no rail with minimal assist.-not met  7. Pt to perform B LE exs in sitting or supine x 15 reps to increase LE strength to improve functional mobility.-not met  8. Pt to perform dynamic sitting EOB x10 minutes with min A.                      Reasons for Discontinuation of Therapy Services  Transfer to alternate level of care.      Plan:  Patient Discharged to: Skilled Nursing Facility.  Lily Kendall PT, DPT  6/29/2017  Pager: [phone]

## 2017-06-29 NOTE — PLAN OF CARE
Problem: Occupational Therapy Goal  Goal: Occupational Therapy Goal  Goals to be met by 7/6/2017:    1.  Sit eob with CG A for dynamic sitting balance during grooming tasks  2.  CG A to complete grooming tasks in supported sitting  3.  Complete UB dressing with min a  4.  Don shorts/underpants or pants with min a  5.  Toilet self with min a  6.  Toilet or BSC transfer with min a   7.  Supine to sit with minimal (A)  8.  Rolling to (B) directions with Stand by assistance   Outcome: Ongoing (interventions implemented as appropriate)  Goals remain appropriate    JAMMIE Escalante  6/29/2017

## 2017-06-29 NOTE — SUBJECTIVE & OBJECTIVE
NIH Stroke Scale:  Interval: 7 days or at discharge (whichever comes first)  Level of Consciousness: 0 - alert  LOC Questions: 1 - answers one correctly (at cognitive baseline from dementia)  LOC Commands: 0 - performs both correctly  Best Gaze: 0 - normal  Visual: 0 - no visual loss  Facial Palsy: 2 - partial  Motor Left Arm: 1 - drift  Motor Right Arm: 0 - no drift  Motor Left Le - can't resist gravity  Motor Right Le - no drift  Limb Ataxia: 0 - absent  Sensory: 0 - normal  Best Language: 0 - no aphasia  Dysarthria: 1 - mild to moderate dysarthria  Extinction and Inattention: 0 - no neglect  NIH Stroke Scale Total: 7  Modified [employer] Scale:   Timeline: At discharge  Modified [employer] Score: 3 - moderate disability

## 2017-06-29 NOTE — ASSESSMENT & PLAN NOTE
Stroke risk factor  - uncontrolled  HgBA1c 9.8  - detemir 15 unit qam, aspart 3 TID with meals  SSI  - passed MBSS and started on diabetic diet, will follow blood glucose and adjust insulin accordingly  - ambulatory referral to endocrine at discharge

## 2017-06-29 NOTE — CONSULTS
SNf consult approved for admit to SNF today. Call nurse report to [phone] call physician report to  going to room 313A

## 2017-06-29 NOTE — PLAN OF CARE
Pt has been approved for admission to [facility] today.  Nurse to call report to [phone] ambulance transport arranged for pickup within the hour.  No additional needs, family in agreement with plan.

## 2017-06-29 NOTE — NURSING
Report called to CAREY Perales at [facility]. Pt. Left floor per stretcher with EMS.

## 2017-06-29 NOTE — PLAN OF CARE
Pt A & O 1 pt repositioned with pillow every q2 hrs,   No skin breakdown noted.  pulses palable +movement/sensation, cap refill WNL in all 4 extremities , monitored for pain and safety. Safety maintained. , pt remained afebrile 97.5 Bed locked and lowered, call light within reach. Will continue to monitor.

## 2017-06-29 NOTE — PT/OT/SLP PROGRESS
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 Pt agreeable to session   daughter concerned about pt being too weak to initiate standing position     Objective:   Patient found with: telemetry, SCD sleeves (not attached to pump) Pt found UIC with LE's elevated. wife and daughter were present.    Functional Mobility:  Bed Mobility Pt found UIC  Roll to the L Max A ( for draw sheet and pad adjustment)  Scooting/Bridging: Moderate Assistance(  Bridge to HOB 4 scoots )   Pts daughter proceeded to put bed in trendelenburg position for further scooting to HOB .   Sit to Supine: Maximum Assistance to lower trunk and for B LE placement    Standing   Did not attempt due to pt with decreased trunk control while sitting at the EOB.    Static / Dynamic Sitting :   EOB 15 min Mod A ( For L UE placement , balance and midline orientation)   Pt performed reaching activity with R UE with VC and TC to maintain  postural control and keep eyes.  Postural deviations : forward head , post pelvic tilt . Forward shoulders.    Transfers:  Pt requires max A to scoot hips to edge of chair via asymmetrical scooting (multiple) with vc's and tc's for B UE placement  Squat pivot MOD/MAX A from BS chair to EOB to the R . VC to facilitate proper weight bearing during transfer.    Education  Pt educated on postural control, midline orientation and not  to push with L UE     Balance:   Static Sit: POOR: Needs MODERATE assist to maintain  Dynamic Sit: FAIR+: Maintains balance through MINIMAL excursions of active trunk motion  Static Stand: 0: Needs MAXIMAL assist to maintain   Dynamic stand: 0: N/A      AM-PAC 6 CLICK MOBILITY  How much help from another person does this patient currently need?   1 = Unable, Total/Dependent Assistance  2 = A lot, Maximum/Moderate Assistance  3 = A little, Minimum/Contact Guard/Supervision  4 = None, Modified [place]/Independent    Turning over in bed (including adjusting bedclothes, sheets and blankets)?: 2  Sitting down on and standing up from a chair with arms (e.g., wheelchair, bedside commode, etc.): 2  Moving from lying on back to sitting on the side of the bed?: 2  Moving to and from a bed to a chair (including a wheelchair)?: 2  Need to walk in hospital room?: 2  Climbing 3-5 steps with a railing?: 1  Total Score: 11    AM-PAC Raw Score CMS G-Code Modifier Level of Impairment Assistance   6 % Total / Unable   7 - 9 CM 80 - 100% Maximal Assist   10 - 14 CL 60 - 80% Moderate Assist   15 - 19 CK 40 - 60% Moderate Assist   20 - 22 CJ 20 - 40% Minimal Assist   23 CI 1-20% SBA / CGA   24 CH 0% Independent/ Mod I     Patient left HOB elevated with call button in reach, RN  notified and wife and daughter present.    Assessment:  Emmett Perez is a 84 y.o. 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 Pt continues to require assistance for upright posture, trunk stability And vc's for keeping eyes open during treatment . Pt would continue to  benefit from skilled PT to address deficits and increase func mobility.      Rehab identified problem list/impairments: Rehab identified problem list/impairments: weakness, impaired endurance, impaired sensation, impaired self care skills, impaired functional mobilty, gait instability, impaired  balance, visual deficits, decreased coordination, decreased upper extremity function, decreased lower extremity function, decreased safety awareness, impaired fine motor, decreased ROM    Rehab potential is good.    Activity tolerance: Good    Discharge recommendations: Discharge Facility/Level Of Care Needs: rehabilitation facility     Barriers to discharge: Barriers to Discharge: Inaccessible home environment, Decreased caregiver support    Equipment recommendations: Equipment Needed After Discharge: bedside commode, wheelchair     GOALS:    Physical Therapy Goals        Problem: Physical Therapy Goal    Goal Priority Disciplines Outcome Goal Variances Interventions   Physical Therapy Goal     PT/OT, PT Ongoing (interventions implemented as appropriate)     Description:  PT goals until 7/8/17    1. Pt supine to sit with SBA-not met  2. Pt sit to supine with SBA-not met  3. Pt sit to stand with RW with CGA-not met  4. Pt to perform gait 100ft with RW with CGA.-not met  5. Pt to transfer bed to/from bedside chair with min A via squat pivot.-not met  6. Pt to up/down 2 steps with no rail with minimal assist.-not met  7. Pt to perform B LE exs in sitting or supine x 15 reps to increase LE strength to improve functional mobility.-not met  8. Pt to perform dynamic sitting EOB x10 minutes with min A.                        PLAN: Pt was discharged from hospital to  rehab program.  Full discharge summary to follow  Plan of Care reviewed with: patient, spouse, daughter         Meghna BrownleeTON  06/29/2017

## 2017-06-30 ENCOUNTER — PATIENT OUTREACH (OUTPATIENT)
Dept: ADMINISTRATIVE | Facility: CLINIC | Age: 82
End: 2017-06-30

## 2017-06-30 PROBLEM — R53.81 DEBILITY: Status: ACTIVE | Noted: 2017-06-30

## 2017-06-30 LAB
BACTERIA STL CULT: NORMAL
POCT GLUCOSE: 232 MG/DL (ref 70–110)
POCT GLUCOSE: 236 MG/DL (ref 70–110)
POCT GLUCOSE: 316 MG/DL (ref 70–110)

## 2017-06-30 PROCEDURE — 97535 SELF CARE MNGMENT TRAINING: CPT

## 2017-06-30 PROCEDURE — 94640 AIRWAY INHALATION TREATMENT: CPT

## 2017-06-30 PROCEDURE — 25000003 PHARM REV CODE 250: Performed by: PHYSICIAN ASSISTANT

## 2017-06-30 PROCEDURE — 99306 1ST NF CARE HIGH MDM 50: CPT | Mod: ,,, | Performed by: HOSPITALIST

## 2017-06-30 PROCEDURE — 97166 OT EVAL MOD COMPLEX 45 MIN: CPT

## 2017-06-30 PROCEDURE — 63600175 PHARM REV CODE 636 W HCPCS: Performed by: PHYSICIAN ASSISTANT

## 2017-06-30 PROCEDURE — 11000004 HC SNF PRIVATE

## 2017-06-30 PROCEDURE — 97161 PT EVAL LOW COMPLEX 20 MIN: CPT

## 2017-06-30 PROCEDURE — 63600175 PHARM REV CODE 636 W HCPCS: Performed by: HOSPITALIST

## 2017-06-30 PROCEDURE — 97530 THERAPEUTIC ACTIVITIES: CPT

## 2017-06-30 PROCEDURE — G8997 SWALLOW GOAL STATUS: HCPCS | Mod: CH

## 2017-06-30 PROCEDURE — 25000242 PHARM REV CODE 250 ALT 637 W/ HCPCS: Performed by: NURSE PRACTITIONER

## 2017-06-30 PROCEDURE — 92610 EVALUATE SWALLOWING FUNCTION: CPT

## 2017-06-30 RX ORDER — IPRATROPIUM BROMIDE AND ALBUTEROL SULFATE 2.5; .5 MG/3ML; MG/3ML
3 SOLUTION RESPIRATORY (INHALATION) EVERY 6 HOURS PRN
Status: DISCONTINUED | OUTPATIENT
Start: 2017-06-30 | End: 2017-07-04 | Stop reason: HOSPADM

## 2017-06-30 RX ORDER — IPRATROPIUM BROMIDE AND ALBUTEROL SULFATE 2.5; .5 MG/3ML; MG/3ML
3 SOLUTION RESPIRATORY (INHALATION) EVERY 6 HOURS
Status: DISCONTINUED | OUTPATIENT
Start: 2017-06-30 | End: 2017-06-30

## 2017-06-30 RX ORDER — ENOXAPARIN SODIUM 100 MG/ML
40 INJECTION SUBCUTANEOUS EVERY 24 HOURS
Status: DISCONTINUED | OUTPATIENT
Start: 2017-06-30 | End: 2017-07-04 | Stop reason: HOSPADM

## 2017-06-30 RX ORDER — HYDRALAZINE HYDROCHLORIDE 25 MG/1
25 TABLET, FILM COATED ORAL EVERY 8 HOURS PRN
Status: DISCONTINUED | OUTPATIENT
Start: 2017-06-30 | End: 2017-07-04 | Stop reason: HOSPADM

## 2017-06-30 RX ADMIN — IPRATROPIUM BROMIDE AND ALBUTEROL SULFATE 3 ML: .5; 3 SOLUTION RESPIRATORY (INHALATION) at 01:06

## 2017-06-30 RX ADMIN — INSULIN DETEMIR 15 UNITS: 100 INJECTION, SOLUTION SUBCUTANEOUS at 09:06

## 2017-06-30 RX ADMIN — INSULIN ASPART 4 UNITS: 100 INJECTION, SOLUTION INTRAVENOUS; SUBCUTANEOUS at 05:06

## 2017-06-30 RX ADMIN — INSULIN ASPART 2 UNITS: 100 INJECTION, SOLUTION INTRAVENOUS; SUBCUTANEOUS at 08:06

## 2017-06-30 RX ADMIN — METOPROLOL SUCCINATE 25 MG: 25 TABLET, EXTENDED RELEASE ORAL at 09:06

## 2017-06-30 RX ADMIN — AMLODIPINE BESYLATE 10 MG: 10 TABLET ORAL at 09:06

## 2017-06-30 RX ADMIN — INSULIN ASPART 3 UNITS: 100 INJECTION, SOLUTION INTRAVENOUS; SUBCUTANEOUS at 05:06

## 2017-06-30 RX ADMIN — DONEPEZIL HYDROCHLORIDE 10 MG: 10 TABLET, FILM COATED ORAL at 09:06

## 2017-06-30 RX ADMIN — STANDARDIZED SENNA CONCENTRATE AND DOCUSATE SODIUM 1 TABLET: 8.6; 5 TABLET, FILM COATED ORAL at 09:06

## 2017-06-30 RX ADMIN — BENZONATATE 100 MG: 100 CAPSULE ORAL at 10:06

## 2017-06-30 RX ADMIN — MEMANTINE HYDROCHLORIDE 10 MG: 10 TABLET ORAL at 10:06

## 2017-06-30 RX ADMIN — MEMANTINE HYDROCHLORIDE 10 MG: 10 TABLET ORAL at 09:06

## 2017-06-30 RX ADMIN — INSULIN ASPART 2 UNITS: 100 INJECTION, SOLUTION INTRAVENOUS; SUBCUTANEOUS at 12:06

## 2017-06-30 RX ADMIN — INSULIN ASPART 3 UNITS: 100 INJECTION, SOLUTION INTRAVENOUS; SUBCUTANEOUS at 08:06

## 2017-06-30 RX ADMIN — BENZONATATE 100 MG: 100 CAPSULE ORAL at 01:06

## 2017-06-30 RX ADMIN — INSULIN ASPART 3 UNITS: 100 INJECTION, SOLUTION INTRAVENOUS; SUBCUTANEOUS at 12:06

## 2017-06-30 RX ADMIN — BENZONATATE 100 MG: 100 CAPSULE ORAL at 05:06

## 2017-06-30 RX ADMIN — ENOXAPARIN SODIUM 40 MG: 100 INJECTION SUBCUTANEOUS at 05:06

## 2017-06-30 RX ADMIN — CLOPIDOGREL BISULFATE 75 MG: 75 TABLET ORAL at 09:06

## 2017-06-30 RX ADMIN — LOSARTAN POTASSIUM 25 MG: 25 TABLET, FILM COATED ORAL at 09:06

## 2017-06-30 RX ADMIN — IPRATROPIUM BROMIDE AND ALBUTEROL SULFATE 3 ML: .5; 3 SOLUTION RESPIRATORY (INHALATION) at 07:06

## 2017-06-30 RX ADMIN — ATORVASTATIN CALCIUM 40 MG: 20 TABLET, FILM COATED ORAL at 09:06

## 2017-06-30 NOTE — HPI
Chief Complaint/Reason for Admission: Debility    History of Present Illness:  Patient is a 84 y.o. male who has a past medical history of Alzheimer disease; chronic kidney disease stage 3; Hypertension; Seizure; Stroke; and Type 2 diabetes mellitus with diabetic neuropathy presented with acute left sided weakness. Imaging in the ED was consistent with ischemic stroke in right thalamocapsular territory consistent w/ small vessel infarct. Patient was admitted to neurology and treated with secondary stroke prevention. He did not receive TPA due to being outside therapeutic window. Post stroke patient was mainly lethargic and encephalopathic.    Patient has been working with PT/OT who recommend rehab for further balance/mobility training. Patients insurance denied rehab and thus being sent to SNF. Patient lives in [place] with his wife in a single story home.  Prior to admission, he was independent with mobility and transfer with use of straight cane. Per MD H&P, history mainly taken from wife and chart review. Patient is lethargic and not able to answer questions. Per Dr. Hoffman H&P, he followed minimal commands when awake.     Today, son and wife at bedside.  Son expressed concern in his change in mentation.  States noticed a difference yesterday where patient did not recognize him or his sister.  Son is not sure if his change in mentation is due to his CVA or from the tramadol he had gotten for his complaint of knee pain.

## 2017-06-30 NOTE — PT/OT/SLP EVAL
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  Past Medical History:   Diagnosis Date    Alzheimer disease     CKD (chronic kidney disease) stage 3, GFR 30-59 ml/min     Hypertension     Seizure     Stroke     Type 2 diabetes mellitus with diabetic neuropathy       Past Surgical History:   Procedure Laterality Date    CERVICAL SPINE SURGERY  2007    CORONARY ANGIOPLASTY WITH STENT PLACEMENT      thyroid nodule  1997         General Precautions: Standard, diabetic, aspiration, fall, seizure  Orthopedic Precautions: N/A   Braces: N/A         Patient History:  Lives With: spouse  Living Arrangements: house  Home Layout: Able to live on 1st floor  Number of Stairs to Enter Home: 0  Stair Railings at Home: outside, present at both sides  Transportation Available: family or friend will provide  Living Environment Comment: Pt's wife works 4-12 (per chart); pt is alone at times. Has adult son that can help during night. Wife reports pt previously used both a QC  and SPC mainly to get around and also be pushed in a w/c or use his rollator for long distances.   Equipment Currently Used at Home: cane, quad, bath bench, cane, straight, rollator, wheelchair, handheld shower head  DME owned (not currently used): none    Previous Level of Function:  Ambulation Skills: needs device  Transfer Skills: needs device  ADL Skills: needs device and assist    Subjective:  Communicated with pt's wife and close frined prior to session.  Pt's wife reports he was more awake yesterday but seems very "sleepy" currently.  Chief Complaint: pt unable to respond at this time.   Patient goals: pt unable to respond at this time.     Pain/Comfort  Pain Rating 1: 0/10  Pain Rating Post-Intervention 1: 0/10    Objective:  Patient " found supine with HOB elevated Patient found with: SCD (bilaterally)    Cognitive Exam:  Oriented to: Person and did not respond to other prompts  Follows Commands/attention: Inattentive, Follows one-step commands occasionally; lethargic and limitedly arrousable  Communication: dysarthria  Safety awareness/insight to disability: impaired    Physical Exam:  Postural examination/scapula alignment: No postural abnormalities identified at this time    Skin integrity: Visible skin intact  Edema: None noted BLE    Sensation: could not be accurately tested at this time d/t pt's state of arousal    Upper Extremity Range of Motion:  Right Upper Extremity: WFL  Left Upper Extremity: WFL    Upper Extremity Strength:  Right Upper Extremity: shoulder abd at least a 3/5; nothing else tested d/t pt's low arousal state  Left Upper Extremity: could not be tested d/t pt lethargic    Lower Extremity Range of Motion:  Right Lower Extremity: WFL  Left Lower Extremity: WFL    Lower Extremity Strength:  Right Lower Extremity: Initiated knee extension  Left Lower Extremity: Palpable contraction of DF/PF, Knee extension    Gross motor coordination: impaired    Functional Status:  MDS G  Bed Mobility Functional Status: total(A)  Bed Mobility Level of (A): 3: Two+ persons physical (A)  Transfer Functional Status: total(A)  Transfer Level of (A): 3: Two+ persons physical (A)  Eval Only: Number of L/E limb <4/5 MMT: 2  Dressing Functional Status: 4:total(A)  Eval Only: Number of U/E limb <4/5 MMT: 2  Moving from seated to standing position: Not steady, only able to stabilize with staff assistance  Walking (with assistive device if used): Activity did not occur  Turning around and facing the opposite direction while walking: Activity did not occur  Moving on and off the toilet: Activity did not occur  Surface-to-surface transfer (transfer between bed and chair or wheelchair): Not steady, only able to stabilize with staff assistance      AM-PAC 6  CLICK MOBILITY  How much help from another person does this patient currently need?   1 = Unable, Total/Dependent Assistance  2 = A lot, Maximum/Moderate Assistance  3 = A little, Minimum/Contact Guard/Supervision  4 = None, Modified [place]/Independent     Turning over in bed (including adjusting bedclothes, sheets and blankets)?: 1  Sitting down on and standing up from a chair with arms (e.g., wheelchair, bedside commode, etc.): 1  Moving from lying on back to sitting on the side of the bed?: 1  Moving to and from a bed to a chair (including a wheelchair)?: 1  Need to walk in hospital room?: 1  Climbing 3-5 steps with a railing?: 1  Total Score: 6     AM-PAC Raw Score CMS G-Code Modifier Level of Impairment Assistance   6 % Total / Unable   7 - 9 CM 80 - 100% Maximal Assist   10 - 14 CL 60 - 80% Moderate Assist   15 - 19 CK 40 - 60% Moderate Assist   20 - 22 CJ 20 - 40% Minimal Assist   23 CI 1-20% SBA / CGA   24 CH 0% Independent/ Mod I       Bed Mobility:  Sit>Supine: on bed Total A of 2  Supine>Sit: on bed Total A of 2    Transfers:  Sit<>Stand: w/c<>  bars using BUE for support w/ assist for , Total A of 3 (1 trial)  Stand Pivot Transfer:    bed>w/c: Total A of 2   W/c>bed: Total A of 2    Therex:  PROM to all joints BUE/LUE to prevent contractures    Balance:  Sitting EOB w/ Mod A of 1 for trunk. Leftward shift in trunk, Right hand pushing to L side. Pt's eyes closed    Patient left HOB elevated with call button in reach, pt's wife and close friend present.    Assessment:  Emmett Perez is a 84 y.o. 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  Pt presents w/ previous pertinent co-morbidities and personal factors including limited caretaker assistance at home, Alzheimer's and seizure. Pt currently presents w/ marked lethargy and limited ability to follow directions. Pt's clinical presentation is evolving presentation w/ changing  characteristics. According to the [facility] pt Total/Unable to assist. These factors classify pt as a LOW complexity evaluation. Pt is appropriate for skilled PT and will begin PT POC.      Rehab identified problem list/impairments: weakness, impaired self care skills, impaired functional mobilty, impaired balance, gait instability, impaired cognition, decreased coordination, decreased upper extremity function, decreased lower extremity function, decreased safety awareness, abnormal tone, impaired coordination    Rehab potential is fair.    Activity tolerance: Poor    Discharge recommendations: rehabilitation facility     Barriers to discharge: Decreased caregiver support    Equipment recommendations:  (TBD)     GOALS:    Physical Therapy Goals        Problem: Physical Therapy Goal    Goal Priority Disciplines Outcome Goal Variances Interventions   Physical Therapy Goal     PT/OT, PT      Description:  Goals to be met by: 3 weeks     Patient will increase functional independence with mobility by performin. Supine to sit with MInimal Assistance  2. Sit to supine with MInimal Assistance  3. Rolling to Left and Right with Minimal Assistance.  4. Sit to stand transfer with Minimal Assistance  5. Bed to chair transfer with Minimal Assistance using Rolling Walker or appropriate AD  6. Gait  x 20 feet with Moderate Assistance using appropriate AD.   7. Wheelchair propulsion x 50 feet with Contact Guard Assistance using bilateral upper extremities and/or LE as appropriate  8. Sitting at edge of bed x5 minutes with Contact Guard Assistance  9. Stand for 5 minutes with Moderate Assistance using Rolling Walker or parallel bars  10. Lower extremity exercise program x20 reps per handout and gym therex with assistance as needed                      PLAN:    Patient to be seen 5 x/week  to address the above listed problems via gait training, therapeutic activities, therapeutic exercises, wheelchair management/training  Plan of  Care Expires: 07/30/17    Gabbynorma Issa, SPT 6/30/2017   I certify that I was present in the room directing the student in service delivery and guiding them using my skilled judgment. As the co-signing therapist I have reviewed the students documentation and am responsible for the treatment, assessment, and plan.

## 2017-06-30 NOTE — PLAN OF CARE
Problem: Physical Therapy Goal  Goal: Physical Therapy Goal  Goals to be met by: 3 weeks     Patient will increase functional independence with mobility by performin. Supine to sit with MInimal Assistance  2. Sit to supine with MInimal Assistance  3. Rolling to Left and Right with Minimal Assistance.  4. Sit to stand transfer with Minimal Assistance  5. Bed to chair transfer with Minimal Assistance using Rolling Walker or appropriate AD  6. Gait  x 20 feet with Moderate Assistance using appropriate AD.   7. Wheelchair propulsion x 50 feet with Contact Guard Assistance using bilateral upper extremities and/or LE as appropriate  8. Sitting at edge of bed x5 minutes with Contact Guard Assistance  9. Stand for 5 minutes with Moderate Assistance using Rolling Walker or parallel bars  10. Lower extremity exercise program x20 reps per handout and gym therex with assistance as needed    PT LUÍS Mason  2017

## 2017-06-30 NOTE — PLAN OF CARE
Problem: Patient Care Overview  Goal: Plan of Care Review  Outcome: Ongoing (interventions implemented as appropriate)   17   Coping/Psychosocial   Plan Of Care Reviewed With patient;spouse;daughter       Problem: Mobility, Physical Impaired (Adult)  Intervention: Monitor/Assist with Self Care   17   Activity   Activity Assistance Provided assistance, 2 people   Functional Level Current   Communication 2 - difficulty understanding (not related to language barrier)     Intervention: Optimize Mobility   17   Activity   Activity Type bedrest;ROM, active encouraged     Intervention: Modify Environment to Minimize Fall Risk   17   Prevent Buna Drop/Fall   Safety/Security Measures bed alarm set       Goal: Identify Related Risk Factors and Signs and Symptoms  Related risk factors and signs and symptoms are identified upon initiation of Human Response Clinical Practice Guideline (CPG)  Outcome: Ongoing (interventions implemented as appropriate)   17   Mobility, Physical Impaired   Related Risk Factors (Physical Mobility, Impaired) cognitive impairment   Signs and Symptoms (Physical Mobility Impaired) decreased balance;inability to purposefully move in environment;limited ability to perform gross/fine motor skills

## 2017-06-30 NOTE — ASSESSMENT & PLAN NOTE
Sr Cr stable  Cont to monitor   Avoid nephrotoxins.    Monitor urine output to assure that no obstruction precipitates worsening in GFR.

## 2017-06-30 NOTE — ASSESSMENT & PLAN NOTE
BP not at goal  BP medications recently adjusted  Add PRN hydralazine for now  Adjust regimen if no improvement in next few days.

## 2017-06-30 NOTE — CLINICAL REVIEW
Clinical Pharmacy Chart Review Note    Admit Date: 6/29/2017   LOS: 0 days     Emmett Perez is a 84 y.o. male admitted to SNF for PT/OT after hospitalization for thrombotic stroke involving right posterior cerebral artery.    Active Hospital Problems    Diagnosis  POA    Acute left hemiparesis [G81.94]  Yes      Resolved Hospital Problems    Diagnosis Date Resolved POA   No resolved problems to display.     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 06/22/2017    Cerebral microvascular disease 06/22/2017    Combined systolic and diastolic cardiac dysfunction 06/22/2017    Cytotoxic cerebral edema 06/22/2017    Type 2 diabetes mellitus with stage 3 chronic kidney disease, with long-term current use of insulin 11/29/2016    Cardiomyopathy     Convulsions 11/28/2016    Type 2 diabetes mellitus with diabetic polyneuropathy, with long-term current use of insulin 11/28/2016    Dementia without behavioral disturbance 11/28/2016    Chronic kidney disease, stage III (moderate) 07/30/2013    Monoclonal paraproteinemia 07/30/2013       Scheduled Meds:    [START ON 6/30/2017] amlodipine  10 mg Oral Daily    [START ON 6/30/2017] atorvastatin  40 mg Oral Daily    benzonatate  100 mg Oral TID    [START ON 6/30/2017] clopidogrel  75 mg Oral Daily    [START ON 6/30/2017] donepezil  10 mg Oral Daily    insulin aspart  3 Units Subcutaneous TIDWM    [START ON 6/30/2017] insulin detemir  15 Units Subcutaneous Daily    [START ON 6/30/2017] losartan  25 mg Oral Daily    memantine  10 mg Oral BID    [START ON 6/30/2017] metoprolol succinate  25 mg Oral Daily    senna-docusate 8.6-50 mg  1 tablet  Oral BID    tuberculin  5 Units Intradermal Once     Continuous Infusions:    PRN Meds: acetaminophen, aluminum-magnesium hydroxide-simethicone, insulin aspart, polyethylene glycol, ramelteon    OBJECTIVE:     Vital Signs (Last 24H)  Temp:  [97.3 °F (36.3 °C)-98.1 °F (36.7 °C)]   Pulse:  [52-75]   Resp:  [15-18]   BP: (140-165)/(68-79)   SpO2:  [93 %-97 %]     Laboratory:  CBC:   Recent Labs  Lab 06/27/17 0437 06/28/17 0415 06/29/17  0405   WBC 7.75 7.85 6.14   RBC 5.13 5.11 5.04   HGB 14.1 13.9* 13.8*   HCT 41.8 41.7 40.7    227 228   MCV 82 82 81*   MCH 27.5 27.2 27.4   MCHC 33.7 33.3 33.9     BMP:   Recent Labs  Lab 06/27/17 0437 06/28/17 0415 06/29/17  0405   * 197* 272*    141 139   K 3.9 3.9 4.2    108 108   CO2 24 24 21*   BUN 26* 20 23   CREATININE 1.4 1.2 1.4   CALCIUM 9.1 9.2 9.3   MG 1.6 1.7 1.8     CMP:   Recent Labs  Lab 06/27/17 0437 06/28/17 0415 06/29/17  0405   * 197* 272*   CALCIUM 9.1 9.2 9.3   ALBUMIN 3.2* 3.2* 3.2*   PROT 6.7 6.6 6.8    141 139   K 3.9 3.9 4.2   CO2 24 24 21*    108 108   BUN 26* 20 23   CREATININE 1.4 1.2 1.4   ALKPHOS 170* 158* 170*   ALT 19 19 32   AST 24 24 35   BILITOT 1.3* 1.1* 1.0     LFTs:   Recent Labs  Lab 06/27/17 0437 06/28/17 0415 06/29/17  0405   ALT 19 19 32   AST 24 24 35   ALKPHOS 170* 158* 170*   BILITOT 1.3* 1.1* 1.0   PROT 6.7 6.6 6.8   ALBUMIN 3.2* 3.2* 3.2*     Coagulation: No results for input(s): INR, APTT in the last 168 hours.    Invalid input(s): PT  Cardiac markers: No results for input(s): CKMB, TROPONINT, MYOGLOBIN in the last 168 hours.  ABGs: No results for input(s): PH, PCO2, PO2, HCO3, POCSATURATED, BE in the last 168 hours.  Microbiology Results (last 7 days)     ** No results found for the last 168 hours. **        Specimen (12h ago through future)    None          Recent Labs  Lab 06/25/17  1412   COLORU Yellow   SPECGRAV 1.015   PHUR 5.0   PROTEINUA Negative   NITRITE Negative    LEUKOCYTESUR Negative   UROBILINOGEN Negative     Others: No results for input(s): [id number], TSH, T4FREE, LABLIPI in the last 168 hours.    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/Acute left hemiparesis/Facial droop/Cerebral microvascular disease  --PT/OT  --plavix 75 mg daily (antithrombotic)   --atorvastatin 40 mg daily (secondary prevention-HLD)  --bowel regimen for constipation: consider changing senna-docusate 8.6-50 mg to BID prn as patient was experiencing diarrhea   --DVT prophylaxis: lovenox 40 mg daily; PLTS 228     Essential hypertension  --toprol XL 25 mg daily, amlodipine 10 mg daily, losartan 25 mg daily hydralazine 25 mg q8h prn SBP > 160  BP: (140-165)/(68-79), HR 59-74 (Goal SBP < 160)    Combined systolic and diastolic cardiac dysfunction/Cardiomyopathy  --maintain euvolemia, not on chronic diuretic therapy  --toprol XL 25 mg daily, losartan 25 mg daily  --salt & fluid restriction   Monitor weight and volume status, EF 43% 06/22/17     Type 2 diabetes mellitus with diabetic polyneuropathy, with long-term current use of insulin  Lab Results   Component Value Date    HGBA1C 9.8 (H) 06/22/2017     POCT Glucose   Date Value Ref Range Status   06/30/2017 236 (H) 70 - 110 mg/dL Final   06/30/2017 232 (H) 70 - 110 mg/dL Final   06/29/2017 163 (H) 70 - 110 mg/dL Final   06/29/2017 243 (H) 70 - 110 mg/dL Final   06/29/2017 248 (H) 70 - 110 mg/dL Final   06/29/2017 257 (H) 70 - 110 mg/dL Final   06/28/2017 283 (H) 70 - 110 mg/dL Final   06/28/2017 192 (H) 70 - 110 mg/dL Final   06/28/2017 160 (H) 70 - 110 mg/dL Final   06/28/2017 135 (H) 70 - 110 mg/dL Final   06/27/2017 179 (H) 70 - 110 mg/dL Final   06/27/2017 285 (H) 70 - 110 mg/dL Final   --SSI 0-5 units AC&HS prn BG, aspart 3 units TID WM, levemir 15 units daily; consider increasing levemir to 17 units daily   Monitor BG as prescribed and adjust regimen as necessary       Dementia without behavioral disturbance  --donepezil 10 mg daily, memantine 10 mg BID   --delirium precautions     Chronic kidney disease, stage III (moderate)  --renally adjust medications, avoid nephrotoxins   6/29/2017 Estimated Creatinine Clearance: 39.3 mL/min (based on Cr of 1.4).     Monoclonal paraproteinemia  --f/u with oncology outpatient      Medications needing a diagnosis:    --benzonatate 100 mg TID     Monitor BMP/CBC/Vitals

## 2017-06-30 NOTE — PLAN OF CARE
Problem: Occupational Therapy Goal  Goal: Occupational Therapy Goal  Goals to be met by: 3 weeks     Patient will increase functional independence with ADLs by performing:    Feeding with Set-up Assistance.  UE Dressing with Contact Guard Assistance.  LE Dressing with Contact Guard Assistance.  Grooming while seated with Set-up Assistance.  Toileting from bedside commode with Contact Guard Assistance for hygiene and clothing management.   Bathing from  sitting at sink with Contact Guard Assistance.  Supine to sit with Supervision.  Stand pivot transfers with Contact Guard Assistance.  Toilet transfer to bedside commode with Contact Guard Assistance.    OT evaluation completed on this date.

## 2017-06-30 NOTE — PT/OT/SLP EVAL
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    Past Medical History:   Diagnosis Date    Alzheimer disease     CKD (chronic kidney disease) stage 3, GFR 30-59 ml/min     Hypertension     Seizure     Stroke     Type 2 diabetes mellitus with diabetic neuropathy       Past Surgical History:   Procedure Laterality Date    CERVICAL SPINE SURGERY  2007    CORONARY ANGIOPLASTY WITH STENT PLACEMENT      thyroid nodule  1997         General Precautions: Standard, aspiration, fall, diabetic, seizure  Orthopedic Precautions: N/A  Braces: N/A    Do you have any cultural, spiritual, Sikh conflicts, given your current situation?: no     Patient History:  (obtained from pt who is noted to have dementia, and family friend who was present)  Lives With: spouse  Living Arrangements: house  Home Accessibility: stairs to enter home  Home Layout: Able to live on 1st floor  Number of Stairs to Enter Home: 1  Stair Railings at Home: outside, present at both sides  Transportation Available: family or friend will provide  Living Environment Comment: Pt lives in [facility] with [address] with spouse. Pt's wife works as a , but it is unclear if she works day or evening shift. Pt says that his son can be with him when his wife is not. Pt has 2 children that live in the area. Per chart, pt was independent with ADL tasks, other than showering. Pt is also alone for portions of the day.  Equipment Currently Used at Home: wheelchair, walker, rolling, rollator, cane, quad, cane, straight, bath bench    Prior level of function:   Bed Mobility/Transfers: independent  Grooming:  independent  Bathing: needs device and assist  Upper Body Dressing: independent  Lower Body Dressing: independent  Toileting: independent  Driving License: No  Mode of Transportation: Family  Education: yes  Occupation: Retired  Type of Occupation:  ( for [employer])  Leisure and Hobbies: chess, reading     Dominant hand: right    Subjective:  Communicated with NSG prior to session.  I'm good.  Chief Complaint: boredom  Patient/Family stated goals: pt wants to walk    Pain/Comfort  Pain Rating 1: 0/10    Objective:   Patient found with:  (supine in bed; family friend present)    Cognitive Exam:  Oriented to: Person and Place  Follows Commands/attention: Inattentive  Communication: Pt is having trouble with understanding commands on this date 2/2 lethargy  Memory:  Impaired STM  Safety awareness/insight to disability: impaired  Coping skills/emotional control: flat affect/ trouble keeping eyes open    Visual/perceptual:  No deficits noted    Physical Exam:  Postural examination/scapula alignment: Head forward and Lateral weight shift of hips to the left   Skin integrity: Visible skin intact  Edema: None noted     Sensation:   Impaired  light/touch LUE noted to have less sensation than RUE    Upper Extremity Range of Motion:  Right Upper Extremity: WFL  Left Upper Extremity: Deficits: Pt unable to reach the back of his head with LUE, however, pt was able to flex shoulder ~120 degrees     Upper Extremity Strength:  Right Upper Extremity: WFL  Left Upper Extremity: unable to fully assess 2/2 lethargy, however pt was able to make a fist with fairly strong grasp   Strength: appears good in both hands, but unable to assess fully 2/2 lethargy    Fine motor coordination:   Impaired  Left hand, manipulation of objects     Gross motor coordination: pt demonstrates significant deficits 2/2 left sided weakness    Functional Status:  MDS G  Bed Mobility Functional Status: Max (A) supine to sit with handrails with  assist at trunk and to bring LLE off bed; sit to supine total (A)  Transfer Functional Status: total (A) squat pivot w/c to bed x 2 persons for safety 2/2 lethargy; Mod(A) squat pivot bed to w/c x 1 person  Dressing Functional Status: LBD total (A) to don/doff pants and socks; UBD mod (A) to doff/don gown  Toilet Use Functional Status: total(A)  Personal Hygiene Functional Status: Mod (A) to brush teeth while seated EOB- pt required VCs to raise toothbrush to mouth and apply toothpaste to toothbrush as well as assist for postural control  Bathing Functional Status: mod(A) to wash RUE, below knees, and buttocks region  Moving from seated to standing position: Activity did not occur  Walking (with assistive device if used): Activity did not occur  Turning around and facing the opposite direction while walking: Activity did not occur  Moving on and off the toilet: Activity did not occur  Surface-to-surface transfer (transfer between bed and chair or wheelchair): Not steady, only able to stabilize with staff assistance      Additional Treatment:  Pt educated on role of OT and POC.  Pt's family friend educated on need for pt to be alert and positioned in a 90 degree angle before attempting to eat.  NSG notified of pt's decreased level of alertness and lethargy.  Pt noted to require min to mod assist for balance seated EOB while performing dynamic sitting tasks.   Pt noted to be lethargic and able to follow ~50% of commands.  Pt's BP taken and noted to be 117/66.  OT spoke with NSG and requested that a wedge and positioning boots be ordered for pt.      Patient left HOB elevated with call button in reach, NSG notified, and friend present.    AM-PAC 6 CLICK ADL  How much help from another person does this patient currently need?  1 = Unable, Total/Dependent Assistance  2 = A lot, Maximum/Moderate Assistance  3 = A little, Minimum/Contact Guard/Supervision  4 = None, Modified [place]/Independent     Putting on and  "taking off regular lower body clothing? : 1  Bathing (including washing, rinsing, drying)?: 2  Toileting, which includes using toilet, bedpan, or urinal? : 1  Putting on and taking off regular upper body clothing?: 2  Taking care of personal grooming such as brushing teeth?: 2  Eating meals?: 3  Total Score: 11     AM-PAC Raw Score CMS "G-Code Modifier Level of Impairment Assistance   6 % Total / Unable   7 - 9 CM 80 - 100% Maximal Assist   10 - 14 CL 60 - 80% Moderate Assist   15 - 19 CK 40 - 60% Moderate Assist   20 - 22 CJ 20 - 40% Minimal Assist   23 CI 1-20% SBA / CGA   24 CH 0% Independent/ Mod I            Assessment:  Emmett Perez is a 84 y.o. 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 Pt began session alert and able to participate in therapy, however the pt became more lethargic as the session continued, and had to be put back to bed. Pt tends to lean heavily to the left, especially when performing dynamic activities. Pt demonstrates deficits in LUE ROM and coordination as well as decreased sensation in the LUE. Pt is a fall risk 2/2 left-sided weakness, lethargy, and fatigue. Pt would benefit from continued OT services to maximize safety and independence with ADL tasks and increase occupational performance in roles and routines.   .    Rehab identified problem list/impairments: weakness, impaired endurance, impaired sensation, impaired self care skills, impaired functional mobilty, impaired balance, impaired cognition, decreased coordination, decreased upper extremity function, decreased lower extremity function, abnormal tone, decreased ROM, impaired coordination, impaired fine motor    Rehab potential is good    Activity tolerance: Fair    Discharge recommendations: home health OT (with 24/hr supervision and moderate assist)     Barriers to " discharge: None     Equipment recommendations:  (TBD)     GOALS:    Occupational Therapy Goals        Problem: Occupational Therapy Goal    Goal Priority Disciplines Outcome Interventions   Occupational Therapy Goal     OT, PT/OT     Description:  Goals to be met by: 3 weeks     Patient will increase functional independence with ADLs by performing:    Feeding with Set-up Assistance.  UE Dressing with Minimal Assistance.  LE Dressing with Contact Guard Assistance.  Grooming while seated with Set-up Assistance.  Toileting from bedside commode with Minimum Assistance for hygiene and clothing management.   Bathing from  sitting at sink with Minimum Assistance.  Supine to sit with Minimum Assistance.  Stand pivot transfers with Minimum Assistance.  Toilet transfer to bedside commode with Minimum Assistance.                       PLAN: Patient to be seen 5 x/week to address the above listed problems via self-care/home management, therapeutic activities, therapeutic exercises  Plan of Care expires: 07/30/17  Plan of Care reviewed with: patient, friend    Huong Gracia, RAN  06/30/2017

## 2017-06-30 NOTE — SUBJECTIVE & OBJECTIVE
Past Medical History:   Diagnosis Date    Alzheimer disease     CKD (chronic kidney disease) stage 3, GFR 30-59 ml/min     Hypertension     Seizure     Stroke     Type 2 diabetes mellitus with diabetic neuropathy        Past Surgical History:   Procedure Laterality Date    CERVICAL SPINE SURGERY  2007    CORONARY ANGIOPLASTY WITH STENT PLACEMENT      thyroid nodule  1997       Review of patient's allergies indicates:  No Known Allergies    Current Facility-Administered Medications on File Prior to Encounter   Medication    [DISCONTINUED] 0.9%  NaCl infusion    [DISCONTINUED] acetaminophen tablet 650 mg    [DISCONTINUED] albuterol-ipratropium 2.5mg-0.5mg/3mL nebulizer solution 3 mL    [DISCONTINUED] amlodipine tablet 10 mg    [DISCONTINUED] atorvastatin tablet 40 mg    [DISCONTINUED] benzonatate capsule 100 mg    [DISCONTINUED] clopidogrel tablet 75 mg    [DISCONTINUED] dextrose 50% injection 12.5 g    [DISCONTINUED] dextrose 50% injection 25 g    [DISCONTINUED] donepezil tablet 10 mg    [DISCONTINUED] enoxaparin injection 40 mg    [DISCONTINUED] glucagon (human recombinant) injection 1 mg    [DISCONTINUED] glucose chewable tablet 16 g    [DISCONTINUED] glucose chewable tablet 24 g    [DISCONTINUED] insulin aspart pen 0-5 Units    [DISCONTINUED] insulin aspart pen 3 Units    [DISCONTINUED] insulin detemir pen 15 Units    [DISCONTINUED] labetalol injection 10 mg    [DISCONTINUED] losartan tablet 25 mg    [DISCONTINUED] memantine tablet 10 mg    [DISCONTINUED] metoprolol succinate (TOPROL-XL) 24 hr tablet 25 mg    [DISCONTINUED] ondansetron injection 4 mg    [DISCONTINUED] polyethylene glycol packet 17 g    [DISCONTINUED] polyethylene glycol packet 17 g    [DISCONTINUED] senna-docusate 8.6-50 mg per tablet 1 tablet    [DISCONTINUED] sodium chloride 0.9% bolus 500 mL    [DISCONTINUED] sodium chloride 0.9% flush 3 mL     Current Outpatient Prescriptions on File Prior to Encounter  "  Medication Sig    amlodipine (NORVASC) 10 MG tablet Take 10 mg by mouth once daily.    aspirin 81 MG Chew Take 81 mg by mouth once daily.    atorvastatin (LIPITOR) 40 MG tablet Take 1 tablet (40 mg total) by mouth once daily.    blood sugar diagnostic Strp Monitors blood sugar before meals and bedtime; disp with lancets; One Touch Verio glucometer    donepezil (ARICEPT) 10 MG tablet Take 10 mg by mouth once daily.    insulin aspart (NOVOLOG FLEXPEN) 100 unit/mL InPn pen Inject 14 Units into the skin 3 (three) times daily with meals.    insulin degludec (TRESIBA FLEXTOUCH U-200) 200 unit/mL (3 mL) InPn Inject 80 Units into the skin once daily.    lisinopril (PRINIVIL,ZESTRIL) 5 MG tablet Take 1 tablet (5 mg total) by mouth once daily.    memantine (NAMENDA) 10 MG Tab Take 10 mg by mouth 2 (two) times daily.    metoprolol succinate (TOPROL-XL) 25 MG 24 hr tablet Take 1 tablet (25 mg total) by mouth once daily.    pen needle, diabetic (BD ULTRA-FINE TARYN PEN NEEDLES) 32 gauge x 5/32" Ndle Uses 5 daily, on multiple daily insulin injections     Family History     None        Social History Main Topics    Smoking status: Never Smoker    Smokeless tobacco: Not on file    Alcohol use Not on file    Drug use: Unknown    Sexual activity: Not on file     Review of Systems   Unable to perform ROS: Mental status change     Objective:     Vital Signs (Most Recent):  Temp: 98.2 °F (36.8 °C) (06/29/17 2000)  Pulse: 70 (06/30/17 0751)  Resp: 16 (06/30/17 0751)  BP: (!) 151/74 (06/29/17 2000)  SpO2: 97 % (06/30/17 0751) Vital Signs (24h Range):  Temp:  [97.5 °F (36.4 °C)-98.2 °F (36.8 °C)] 98.2 °F (36.8 °C)  Pulse:  [59-75] 70  Resp:  [15-18] 16  SpO2:  [93 %-97 %] 97 %  BP: (143-165)/(74-79) 151/74     Weight: 80.2 kg (176 lb 12.9 oz)  Body mass index is 26.11 kg/m².    Physical Exam   Constitutional: Vital signs are normal. He appears well-developed and well-nourished. He appears lethargic.  Non-toxic appearance. " He does not appear ill. No distress.   HENT:   Head: Normocephalic and atraumatic.   Eyes: Conjunctivae and EOM are normal. Pupils are equal, round, and reactive to light. No scleral icterus.   Neck: Normal range of motion and full passive range of motion without pain. Neck supple. No JVD present. Carotid bruit is not present. No thyromegaly present.   Cardiovascular: Normal rate, regular rhythm, S1 normal, S2 normal, normal heart sounds and normal pulses.  Exam reveals no gallop, no S3, no S4 and no friction rub.    No murmur heard.  Pulmonary/Chest: Effort normal and breath sounds normal. No accessory muscle usage. No tachypnea. No respiratory distress. He has no decreased breath sounds. He has no wheezes. He has no rhonchi. He has no rales.   Abdominal: Soft. Normal appearance and bowel sounds are normal. He exhibits no shifting dullness, no distension, no abdominal bruit and no ascites. There is no hepatosplenomegaly. There is no tenderness. There is no rigidity and no guarding.   Musculoskeletal: Normal range of motion. He exhibits no edema or tenderness.   Neurological: He appears lethargic. He is disoriented. No cranial nerve deficit or sensory deficit.   Skin: Skin is warm, dry and intact. No abrasion and no lesion noted.   Psychiatric: He has a normal mood and affect. Judgment and thought content normal. His mood appears not anxious. His speech is delayed. He is slowed. He is not actively hallucinating. Cognition and memory are impaired. He does not exhibit a depressed mood. He is attentive.       Significant Labs: All pertinent labs within the past 24 hours have been reviewed.    Significant Imaging: I have reviewed all pertinent imaging results/findings within the past 24 hours.

## 2017-06-30 NOTE — PT/OT/SLP EVAL
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 : Yes    General Precautions: Standard, aspiration, diabetic, fall, seizure          Prior diet: regular/thins    Subjective:  Pt sleepy, but able to stay alert with stimulation.           Objective:        Oral Musculature Evaluation  Oral Musculature: WFL  Dentition: present and adequate  Mucosal Quality: good  Mandibular Strength and Mobility: WFL  Oral Labial Strength and Mobility: WFL  Lingual Strength and Mobility: WFL  Velar Elevation: WFL  Volitional Cough: strong  Volitional Swallow: elicited  Voice Prior to PO Intake: dry, clear     Bedside Swallow Eval:  Consistencies Assessed: Thin liquids cup sips of thin water and Solids 1/4 cracker x 2  Oral Phase: WFL  Pharyngeal Phase: delayed swallow responses, may be related to sleepiness no overt s/s of aspiration    Additional Treatment:  Education provided to pt regarding  role of ST, swallow evaluation, results of recent MBSS (revealed aspiration of thin liquids via straw with cough response), ensuring pt is awake/alert for PO intake, and ST POC.  Pt verbalized understanding, but full understanding questionable given pt's sleepiness t/o session.      Assessment:  Emmett Perez is a 84 y.o. 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           Discharge recommendations: Discharge Facility/Level Of Care Needs:  (TBD pending progress)     Diet recommendations: Solid Diet Level: Regular  Liquid Diet Level: Thin     Goals:    SLP Goals        Problem: SLP Goal    Goal Priority Disciplines Outcome   SLP Goal     SLP    Description:  Speech Language Pathology Goals  Goals expected to be met by 7/7:  1. Pt will tolerate regular consistency diet and thin liquids without s/s of aspiration.  2. Pt will participate in [id number] evaluation to determine need for further intervention.                         Plan:   Patient to be seen Therapy Frequency: 5 x/week   Plan of Care expires: 07/30/17  Plan of Care reviewed with:    SLP Follow-up?: Yes              STACIE Starr, CCC-SLP  06/30/2017      STACIE Starr, CCC-SLP  Speech Language Pathologist  [phone]  6/30/2017

## 2017-06-30 NOTE — ASSESSMENT & PLAN NOTE
Maintain euvolemia by monitoring I/O's and daily weights.  Fluid restriction (2 liters/24 hours)  Low Na diet  Cont ARB, BB. Not on a diuretic. Appears euvolemic.

## 2017-06-30 NOTE — ASSESSMENT & PLAN NOTE
Neurologically stable  Cont Plavix and atorvastatin for secondary stroke prevention.   Cont neuro checks  Cont with PT/OT for gait training and strengthening and restoration of ADL's   Cont DVT prophylaxis with Lovenox     Future Appointments  Date Time Provider Department Center   7/25/2017 11:00 AM Chayo Barragan MD [facility] STROKE [facility]

## 2017-06-30 NOTE — PLAN OF CARE
Problem: Patient Care Overview  Goal: Plan of Care Review  Outcome: Ongoing (interventions implemented as appropriate)  FALL PRECAUTIONS MAINTAINED NO INJURIES NOTED.NO PRESSURE ULCERS NOTED.REMAINS CONFUSED REORIENTED  TO TIME, PLACE AND SITUATION PRN.

## 2017-06-30 NOTE — H&P
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       HPI: Chief Complaint/Reason for Admission: Debility    History of Present Illness:  Patient is a 84 y.o. male who has a past medical history of Alzheimer disease; chronic kidney disease stage 3; Hypertension; Seizure; Stroke; and Type 2 diabetes mellitus with diabetic neuropathy presented with acute left sided weakness. Imaging in the ED was consistent with ischemic stroke in right thalamocapsular territory consistent w/ small vessel infarct. Patient was admitted to neurology and treated with secondary stroke prevention. He did not receive TPA due to being outside therapeutic window. Post stroke patient was mainly lethargic and encephalopathic. Patient has been working with PT/OT who recommend rehab for further balance/mobility training. Patients insurance denied rehab and thus being sent to SNF. Patient lives in [place] with his wife in a single story home.  Prior to admission, he was independent with mobility and transfer with use of straight cane. Wife is at bedside and history mainly taken from wife and chart review. Patient is lethargic and not able to answer questions. He follows minimal commands when awake. Wife states mental status is improving slightly. Wife states that his oral intake is better and ate 50% of lunch today. Wife states his diarrhea has improved as well.          Past Medical History:   Diagnosis Date    Alzheimer disease     CKD (chronic kidney disease) stage 3, GFR 30-59 ml/min     Hypertension     Seizure     Stroke     Type 2 diabetes mellitus with diabetic neuropathy        Past Surgical History:   Procedure  Laterality Date    CERVICAL SPINE SURGERY  2007    CORONARY ANGIOPLASTY WITH STENT PLACEMENT      thyroid nodule  1997       Review of patient's allergies indicates:  No Known Allergies    Current Facility-Administered Medications on File Prior to Encounter   Medication    [DISCONTINUED] 0.9%  NaCl infusion    [DISCONTINUED] acetaminophen tablet 650 mg    [DISCONTINUED] albuterol-ipratropium 2.5mg-0.5mg/3mL nebulizer solution 3 mL    [DISCONTINUED] amlodipine tablet 10 mg    [DISCONTINUED] atorvastatin tablet 40 mg    [DISCONTINUED] benzonatate capsule 100 mg    [DISCONTINUED] clopidogrel tablet 75 mg    [DISCONTINUED] dextrose 50% injection 12.5 g    [DISCONTINUED] dextrose 50% injection 25 g    [DISCONTINUED] donepezil tablet 10 mg    [DISCONTINUED] enoxaparin injection 40 mg    [DISCONTINUED] glucagon (human recombinant) injection 1 mg    [DISCONTINUED] glucose chewable tablet 16 g    [DISCONTINUED] glucose chewable tablet 24 g    [DISCONTINUED] insulin aspart pen 0-5 Units    [DISCONTINUED] insulin aspart pen 3 Units    [DISCONTINUED] insulin detemir pen 15 Units    [DISCONTINUED] labetalol injection 10 mg    [DISCONTINUED] losartan tablet 25 mg    [DISCONTINUED] memantine tablet 10 mg    [DISCONTINUED] metoprolol succinate (TOPROL-XL) 24 hr tablet 25 mg    [DISCONTINUED] ondansetron injection 4 mg    [DISCONTINUED] polyethylene glycol packet 17 g    [DISCONTINUED] polyethylene glycol packet 17 g    [DISCONTINUED] senna-docusate 8.6-50 mg per tablet 1 tablet    [DISCONTINUED] sodium chloride 0.9% bolus 500 mL    [DISCONTINUED] sodium chloride 0.9% flush 3 mL     Current Outpatient Prescriptions on File Prior to Encounter   Medication Sig    amlodipine (NORVASC) 10 MG tablet Take 10 mg by mouth once daily.    aspirin 81 MG Chew Take 81 mg by mouth once daily.    atorvastatin (LIPITOR) 40 MG tablet Take 1 tablet (40 mg total) by mouth once daily.    blood sugar diagnostic Strp  "Monitors blood sugar before meals and bedtime; disp with lancets; One Touch Verio glucometer    donepezil (ARICEPT) 10 MG tablet Take 10 mg by mouth once daily.    insulin aspart (NOVOLOG FLEXPEN) 100 unit/mL InPn pen Inject 14 Units into the skin 3 (three) times daily with meals.    insulin degludec (TRESIBA FLEXTOUCH U-200) 200 unit/mL (3 mL) InPn Inject 80 Units into the skin once daily.    lisinopril (PRINIVIL,ZESTRIL) 5 MG tablet Take 1 tablet (5 mg total) by mouth once daily.    memantine (NAMENDA) 10 MG Tab Take 10 mg by mouth 2 (two) times daily.    metoprolol succinate (TOPROL-XL) 25 MG 24 hr tablet Take 1 tablet (25 mg total) by mouth once daily.    pen needle, diabetic (BD ULTRA-FINE TARYN PEN NEEDLES) 32 gauge x 5/32" Ndle Uses 5 daily, on multiple daily insulin injections     Family History     None        Social History Main Topics    Smoking status: Never Smoker    Smokeless tobacco: Not on file    Alcohol use Not on file    Drug use: Unknown    Sexual activity: Not on file     Review of Systems   Unable to perform ROS: Mental status change     Objective:     Vital Signs (Most Recent):  Temp: 98.2 °F (36.8 °C) (06/29/17 2000)  Pulse: 70 (06/30/17 0751)  Resp: 16 (06/30/17 0751)  BP: (!) 151/74 (06/29/17 2000)  SpO2: 97 % (06/30/17 0751) Vital Signs (24h Range):  Temp:  [97.5 °F (36.4 °C)-98.2 °F (36.8 °C)] 98.2 °F (36.8 °C)  Pulse:  [59-75] 70  Resp:  [15-18] 16  SpO2:  [93 %-97 %] 97 %  BP: (143-165)/(74-79) 151/74     Weight: 80.2 kg (176 lb 12.9 oz)  Body mass index is 26.11 kg/m².    Physical Exam   Constitutional: Vital signs are normal. He appears well-developed and well-nourished. He appears lethargic.  Non-toxic appearance. He does not appear ill. No distress.   HENT:   Head: Normocephalic and atraumatic.   Eyes: Conjunctivae and EOM are normal. Pupils are equal, round, and reactive to light. No scleral icterus.   Neck: Normal range of motion and full passive range of motion without " pain. Neck supple. No JVD present. Carotid bruit is not present. No thyromegaly present.   Cardiovascular: Normal rate, regular rhythm, S1 normal, S2 normal, normal heart sounds and normal pulses.  Exam reveals no gallop, no S3, no S4 and no friction rub.    No murmur heard.  Pulmonary/Chest: Effort normal and breath sounds normal. No accessory muscle usage. No tachypnea. No respiratory distress. He has no decreased breath sounds. He has no wheezes. He has no rhonchi. He has no rales.   Abdominal: Soft. Normal appearance and bowel sounds are normal. He exhibits no shifting dullness, no distension, no abdominal bruit and no ascites. There is no hepatosplenomegaly. There is no tenderness. There is no rigidity and no guarding.   Musculoskeletal: Normal range of motion. He exhibits no edema or tenderness.   Neurological: He appears lethargic. He is disoriented. No cranial nerve deficit or sensory deficit.   Skin: Skin is warm, dry and intact. No abrasion and no lesion noted.   Psychiatric: He has a normal mood and affect. Judgment and thought content normal. His mood appears not anxious. His speech is delayed. He is slowed. He is not actively hallucinating. Cognition and memory are impaired. He does not exhibit a depressed mood. He is attentive.       Significant Labs: All pertinent labs within the past 24 hours have been reviewed.    Significant Imaging: I have reviewed all pertinent imaging results/findings within the past 24 hours.    Assessment/Plan:     Debility    PT/OT as above.         Combined systolic and diastolic cardiac dysfunction    Maintain euvolemia by monitoring I/O's and daily weights.  Fluid restriction (2 liters/24 hours)  Low Na diet  Cont ARB, BB. Not on a diuretic. Appears euvolemic.           Essential hypertension    BP not at goal  BP medications recently adjusted  Add PRN hydralazine for now  Adjust regimen if no improvement in next few days.         Dementia without behavioral disturbance     Cont donepezil and memantine to treat  Delirium precautions.         Type 2 diabetes mellitus with diabetic polyneuropathy, with long-term current use of insulin    BG levels uncontrolled.   PO intake is variable  Cont current detemir/novolog regimen.   Will likely need adjustment.   ADA diet          Monoclonal paraproteinemia    Chronic and stable  Follow up with oncology as outpatient.         Chronic kidney disease, stage III (moderate)    Sr Cr stable  Cont to monitor   Avoid nephrotoxins.    Monitor urine output to assure that no obstruction precipitates worsening in GFR.             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   Cont neuro checks  Cont with PT/OT for gait training and strengthening and restoration of ADL's   Cont DVT prophylaxis with Lovenox     Future Appointments  Date Time Provider Department Center   7/25/2017 11:00 AM Chayo Barragan MD [facility] STROKE Eb Elaine Mayfield MD  [facility]

## 2017-06-30 NOTE — PROGRESS NOTES
PPD placed to Right Forearm. Area marked with permanent marker and dated. ppdo be read in 48-72 hours.

## 2017-06-30 NOTE — ASSESSMENT & PLAN NOTE
BG levels uncontrolled.   PO intake is variable  Cont current detemir/novolog regimen.   Will likely need adjustment.   ADA diet

## 2017-07-01 LAB
POCT GLUCOSE: 251 MG/DL (ref 70–110)
POCT GLUCOSE: 255 MG/DL (ref 70–110)
POCT GLUCOSE: 312 MG/DL (ref 70–110)

## 2017-07-01 PROCEDURE — 25000003 PHARM REV CODE 250: Performed by: HOSPITALIST

## 2017-07-01 PROCEDURE — 63600175 PHARM REV CODE 636 W HCPCS: Performed by: HOSPITALIST

## 2017-07-01 PROCEDURE — 25000003 PHARM REV CODE 250: Performed by: PHYSICIAN ASSISTANT

## 2017-07-01 PROCEDURE — 97112 NEUROMUSCULAR REEDUCATION: CPT

## 2017-07-01 PROCEDURE — 63600175 PHARM REV CODE 636 W HCPCS: Performed by: PHYSICIAN ASSISTANT

## 2017-07-01 PROCEDURE — 11000004 HC SNF PRIVATE

## 2017-07-01 RX ORDER — AMOXICILLIN 250 MG
1 CAPSULE ORAL 2 TIMES DAILY PRN
Status: DISCONTINUED | OUTPATIENT
Start: 2017-07-01 | End: 2017-07-04 | Stop reason: HOSPADM

## 2017-07-01 RX ORDER — INSULIN ASPART 100 [IU]/ML
5 INJECTION, SOLUTION INTRAVENOUS; SUBCUTANEOUS
Status: DISCONTINUED | OUTPATIENT
Start: 2017-07-01 | End: 2017-07-02

## 2017-07-01 RX ORDER — TRAMADOL HYDROCHLORIDE 50 MG/1
50 TABLET ORAL EVERY 6 HOURS PRN
Status: DISCONTINUED | OUTPATIENT
Start: 2017-07-01 | End: 2017-07-03

## 2017-07-01 RX ORDER — BENZONATATE 100 MG/1
100 CAPSULE ORAL 3 TIMES DAILY
Status: DISCONTINUED | OUTPATIENT
Start: 2017-07-01 | End: 2017-07-03

## 2017-07-01 RX ADMIN — ACETAMINOPHEN 650 MG: 325 TABLET ORAL at 11:07

## 2017-07-01 RX ADMIN — ATORVASTATIN CALCIUM 40 MG: 20 TABLET, FILM COATED ORAL at 08:07

## 2017-07-01 RX ADMIN — INSULIN ASPART 3 UNITS: 100 INJECTION, SOLUTION INTRAVENOUS; SUBCUTANEOUS at 05:07

## 2017-07-01 RX ADMIN — ENOXAPARIN SODIUM 40 MG: 100 INJECTION SUBCUTANEOUS at 05:07

## 2017-07-01 RX ADMIN — CLOPIDOGREL BISULFATE 75 MG: 75 TABLET ORAL at 08:07

## 2017-07-01 RX ADMIN — INSULIN ASPART 5 UNITS: 100 INJECTION, SOLUTION INTRAVENOUS; SUBCUTANEOUS at 05:07

## 2017-07-01 RX ADMIN — AMLODIPINE BESYLATE 10 MG: 10 TABLET ORAL at 08:07

## 2017-07-01 RX ADMIN — BENZONATATE 100 MG: 100 CAPSULE ORAL at 11:07

## 2017-07-01 RX ADMIN — MEMANTINE HYDROCHLORIDE 10 MG: 10 TABLET ORAL at 08:07

## 2017-07-01 RX ADMIN — INSULIN ASPART 4 UNITS: 100 INJECTION, SOLUTION INTRAVENOUS; SUBCUTANEOUS at 12:07

## 2017-07-01 RX ADMIN — LOSARTAN POTASSIUM 25 MG: 25 TABLET, FILM COATED ORAL at 08:07

## 2017-07-01 RX ADMIN — INSULIN ASPART 5 UNITS: 100 INJECTION, SOLUTION INTRAVENOUS; SUBCUTANEOUS at 12:07

## 2017-07-01 RX ADMIN — MEMANTINE HYDROCHLORIDE 10 MG: 10 TABLET ORAL at 11:07

## 2017-07-01 RX ADMIN — DONEPEZIL HYDROCHLORIDE 10 MG: 10 TABLET, FILM COATED ORAL at 08:07

## 2017-07-01 RX ADMIN — INSULIN ASPART 3 UNITS: 100 INJECTION, SOLUTION INTRAVENOUS; SUBCUTANEOUS at 08:07

## 2017-07-01 RX ADMIN — BENZONATATE 100 MG: 100 CAPSULE ORAL at 01:07

## 2017-07-01 RX ADMIN — INSULIN DETEMIR 15 UNITS: 100 INJECTION, SOLUTION SUBCUTANEOUS at 08:07

## 2017-07-01 RX ADMIN — METOPROLOL SUCCINATE 25 MG: 25 TABLET, EXTENDED RELEASE ORAL at 08:07

## 2017-07-01 NOTE — PLAN OF CARE
Pt A & O 1 pt repositioned with pillow every q2 hrs,  no skin breakdown noted.  pulses palable +movement/sensation, cap refill WNL in all 4 extremities , monitored for pain and safety. Safety maintained. , pt remained afebrile 96.4 axillary Bed locked and lowered, call light within reach. Will continue to monitor

## 2017-07-01 NOTE — PT/OT/SLP PROGRESS
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    Functional Status:  Bed Mobility Functional Status: total(A)    OT Exercises: PROM LUE all major joints/planes    Additional Treatment:  Pt sat EOB ~30min for neuro re-ed including weight bearing to LUE while actively reaching in all planes with RUE  Following weight bearing to LUE, pt then participated in reaching activity with LUE, demonstrating ability to grasp hoop, raise over 90 degrees, accurately place hoop on rung, and release hoop   During seated activities, pt was provided with verbal/tactile cues for midline orientation, anterior pelvic tilt, and cervical extension/forward gaze  Sitting balance level of assist ranged from CGA-Max A depending on demands of task  Pt demonstrated ability to maintain postural control with LUE supported on EOB drinking from a cup in RUE without physical assist   Pt's wife education on positioning of LUE in bed (elevated/supported)  Pt stood from EOB x 3 trials with Max A x 2 persons and B knee block ~30s each trial (did not achieve fully upright posture due to trunk flexion)    Patient left left sidelying with call button in reach and wife present    ASSESSMENT:  Emmett Perez is a 84 y.o. male with a medical diagnosis of Thrombotic stroke  involving right posterior cerebral artery.  Pt presented initially difficult to arouse, but with good participation once alert.  Pt responded well to neuro re-ed, with improved sitting balance and LUE AROM noted.  Pt will benefit from continued skilled OT services to maximize functional independence.    Rehab identified problem list/impairments: weakness, impaired endurance, impaired sensation, impaired self care skills, impaired functional mobilty, impaired balance, impaired cognition, decreased coordination, decreased upper extremity function, decreased lower extremity function, abnormal tone, decreased ROM, impaired coordination, impaired fine motor    Rehab potential is excellent    Activity tolerance: Fair    Discharge recommendations: home health OT (with 24/hr supervision and moderate assist)     Barriers to discharge: None     Equipment recommendations:  (TBD)     GOALS:    Occupational Therapy Goals        Problem: Occupational Therapy Goal    Goal Priority Disciplines Outcome Interventions   Occupational Therapy Goal     OT, PT/OT Ongoing (interventions implemented as appropriate)    Description:  Goals to be met by: 3 weeks     Patient will increase functional independence with ADLs by performing:    Feeding with Set-up Assistance.  UE Dressing with Minimal Assistance.  LE Dressing with Contact Guard Assistance.  Grooming while seated with Set-up Assistance.  Toileting from bedside commode with Minimum Assistance for hygiene and clothing management.   Bathing from  sitting at sink with Minimum Assistance.  Supine to sit with Minimum Assistance.  Stand pivot transfers with Minimum Assistance.  Toilet transfer to bedside commode with Minimum Assistance.                       Plan:  Patient to be seen 5 x/week to address the above listed problems via self-care/home management, therapeutic activities, therapeutic exercises  Plan of Care expires: 07/30/17  Plan of Care reviewed with: patient, friend    Jose HOLT  AIXA Dozier  07/01/2017

## 2017-07-01 NOTE — PLAN OF CARE
Problem: Occupational Therapy Goal  Goal: Occupational Therapy Goal  Goals to be met by: 3 weeks     Patient will increase functional independence with ADLs by performing:    Feeding with Set-up Assistance.  UE Dressing with Minimal Assistance.  LE Dressing with Contact Guard Assistance.  Grooming while seated with Set-up Assistance.  Toileting from bedside commode with Minimum Assistance for hygiene and clothing management.   Bathing from  sitting at sink with Minimum Assistance.  Supine to sit with Minimum Assistance.  Stand pivot transfers with Minimum Assistance.  Toilet transfer to bedside commode with Minimum Assistance.      Outcome: Ongoing (interventions implemented as appropriate)  OT goals remain appropriate.  AIXA De Luna  7/1/2017

## 2017-07-02 LAB
POCT GLUCOSE: 249 MG/DL (ref 70–110)
POCT GLUCOSE: 287 MG/DL (ref 70–110)
POCT GLUCOSE: 289 MG/DL (ref 70–110)
POCT GLUCOSE: 317 MG/DL (ref 70–110)

## 2017-07-02 PROCEDURE — 25000003 PHARM REV CODE 250: Performed by: NURSE PRACTITIONER

## 2017-07-02 PROCEDURE — 63600175 PHARM REV CODE 636 W HCPCS: Performed by: HOSPITALIST

## 2017-07-02 PROCEDURE — 25000003 PHARM REV CODE 250: Performed by: PHYSICIAN ASSISTANT

## 2017-07-02 PROCEDURE — 25000003 PHARM REV CODE 250: Performed by: HOSPITALIST

## 2017-07-02 PROCEDURE — 11000004 HC SNF PRIVATE

## 2017-07-02 RX ORDER — INSULIN ASPART 100 [IU]/ML
7 INJECTION, SOLUTION INTRAVENOUS; SUBCUTANEOUS
Status: DISCONTINUED | OUTPATIENT
Start: 2017-07-03 | End: 2017-07-03

## 2017-07-02 RX ADMIN — INSULIN ASPART 5 UNITS: 100 INJECTION, SOLUTION INTRAVENOUS; SUBCUTANEOUS at 12:07

## 2017-07-02 RX ADMIN — BENZONATATE 100 MG: 100 CAPSULE ORAL at 02:07

## 2017-07-02 RX ADMIN — INSULIN ASPART 2 UNITS: 100 INJECTION, SOLUTION INTRAVENOUS; SUBCUTANEOUS at 12:07

## 2017-07-02 RX ADMIN — INSULIN ASPART 1 UNITS: 100 INJECTION, SOLUTION INTRAVENOUS; SUBCUTANEOUS at 10:07

## 2017-07-02 RX ADMIN — CLOPIDOGREL BISULFATE 75 MG: 75 TABLET ORAL at 08:07

## 2017-07-02 RX ADMIN — METOPROLOL SUCCINATE 25 MG: 25 TABLET, EXTENDED RELEASE ORAL at 08:07

## 2017-07-02 RX ADMIN — ENOXAPARIN SODIUM 40 MG: 100 INJECTION SUBCUTANEOUS at 05:07

## 2017-07-02 RX ADMIN — TRAMADOL HYDROCHLORIDE 50 MG: 50 TABLET, COATED ORAL at 10:07

## 2017-07-02 RX ADMIN — ATORVASTATIN CALCIUM 40 MG: 20 TABLET, FILM COATED ORAL at 08:07

## 2017-07-02 RX ADMIN — INSULIN ASPART 5 UNITS: 100 INJECTION, SOLUTION INTRAVENOUS; SUBCUTANEOUS at 05:07

## 2017-07-02 RX ADMIN — INSULIN ASPART 5 UNITS: 100 INJECTION, SOLUTION INTRAVENOUS; SUBCUTANEOUS at 08:07

## 2017-07-02 RX ADMIN — DONEPEZIL HYDROCHLORIDE 10 MG: 10 TABLET, FILM COATED ORAL at 08:07

## 2017-07-02 RX ADMIN — INSULIN ASPART 3 UNITS: 100 INJECTION, SOLUTION INTRAVENOUS; SUBCUTANEOUS at 08:07

## 2017-07-02 RX ADMIN — BENZONATATE 100 MG: 100 CAPSULE ORAL at 10:07

## 2017-07-02 RX ADMIN — BENZONATATE 100 MG: 100 CAPSULE ORAL at 06:07

## 2017-07-02 RX ADMIN — MEMANTINE HYDROCHLORIDE 10 MG: 10 TABLET ORAL at 10:07

## 2017-07-02 RX ADMIN — MEMANTINE HYDROCHLORIDE 10 MG: 10 TABLET ORAL at 08:07

## 2017-07-02 RX ADMIN — INSULIN ASPART 4 UNITS: 100 INJECTION, SOLUTION INTRAVENOUS; SUBCUTANEOUS at 05:07

## 2017-07-02 RX ADMIN — LOSARTAN POTASSIUM 25 MG: 25 TABLET, FILM COATED ORAL at 08:07

## 2017-07-02 RX ADMIN — POLYETHYLENE GLYCOL 3350 17 G: 17 POWDER, FOR SOLUTION ORAL at 08:07

## 2017-07-02 RX ADMIN — AMLODIPINE BESYLATE 10 MG: 10 TABLET ORAL at 08:07

## 2017-07-02 NOTE — PLAN OF CARE
Pt A & O 1 pt repositioned with pillow every q2 hrs,  no skin breakdown noted.  pulses palable +movement/sensation, cap refill WNL in all 4 extremities , monitored for pain and safety. Safety maintained. , pt remained afebrile 96.6 axillary Bed locked and lowered, call light within reach. Will continue to monitor

## 2017-07-02 NOTE — NURSING
Dr. Hoffman notified of accucheck of 317 and significant other concerns of increase in accucheck. Order received to increase aspart to 7units TID meals and Levemier to 19units daily. Primary nurse informed of orders and significant other.

## 2017-07-02 NOTE — PT/OT/SLP PROGRESS
Physical Therapy      Emmett Perez  MRN: [id number]    Patient not seen today secondary to patient on hold, going for testing of Right knee w/ pain, swelling  . Will follow-up when appropriate.    Debra Hercules, PT

## 2017-07-03 ENCOUNTER — HOSPITAL ENCOUNTER (INPATIENT)
Facility: HOSPITAL | Age: 82
LOS: 5 days | Discharge: LEFT BEFORE DISCHARGE | DRG: 092 | End: 2017-07-09
Attending: EMERGENCY MEDICINE | Admitting: EMERGENCY MEDICINE
Payer: MEDICARE

## 2017-07-03 VITALS
BODY MASS INDEX: 26.19 KG/M2 | WEIGHT: 176.81 LBS | RESPIRATION RATE: 19 BRPM | HEIGHT: 69 IN | SYSTOLIC BLOOD PRESSURE: 140 MMHG | TEMPERATURE: 98 F | DIASTOLIC BLOOD PRESSURE: 82 MMHG | HEART RATE: 104 BPM | OXYGEN SATURATION: 98 %

## 2017-07-03 DIAGNOSIS — E83.51 HYPOCALCEMIA: ICD-10-CM

## 2017-07-03 DIAGNOSIS — M25.562 ACUTE PAIN OF LEFT KNEE: ICD-10-CM

## 2017-07-03 DIAGNOSIS — E11.22 TYPE 2 DIABETES MELLITUS WITH STAGE 3 CHRONIC KIDNEY DISEASE, WITH LONG-TERM CURRENT USE OF INSULIN: ICD-10-CM

## 2017-07-03 DIAGNOSIS — I10 ESSENTIAL HYPERTENSION: ICD-10-CM

## 2017-07-03 DIAGNOSIS — Z79.4 TYPE 2 DIABETES MELLITUS WITH DIABETIC POLYNEUROPATHY, WITH LONG-TERM CURRENT USE OF INSULIN: ICD-10-CM

## 2017-07-03 DIAGNOSIS — E87.6 HYPOKALEMIA: ICD-10-CM

## 2017-07-03 DIAGNOSIS — I69.354 HEMIPARESIS AFFECTING LEFT SIDE AS LATE EFFECT OF CEREBROVASCULAR ACCIDENT: ICD-10-CM

## 2017-07-03 DIAGNOSIS — I51.89 COMBINED SYSTOLIC AND DIASTOLIC CARDIAC DYSFUNCTION: ICD-10-CM

## 2017-07-03 DIAGNOSIS — Z79.4 TYPE 2 DIABETES MELLITUS WITH HYPERGLYCEMIA, WITH LONG-TERM CURRENT USE OF INSULIN: ICD-10-CM

## 2017-07-03 DIAGNOSIS — E87.0 ACUTE HYPERNATREMIA: ICD-10-CM

## 2017-07-03 DIAGNOSIS — N18.30 TYPE 2 DIABETES MELLITUS WITH STAGE 3 CHRONIC KIDNEY DISEASE, WITH LONG-TERM CURRENT USE OF INSULIN: ICD-10-CM

## 2017-07-03 DIAGNOSIS — G93.40 ACUTE ENCEPHALOPATHY: ICD-10-CM

## 2017-07-03 DIAGNOSIS — R40.0 SOMNOLENCE: ICD-10-CM

## 2017-07-03 DIAGNOSIS — G81.94 HEMIPARESIS, LEFT: ICD-10-CM

## 2017-07-03 DIAGNOSIS — F02.80 LATE ONSET ALZHEIMER'S DISEASE WITHOUT BEHAVIORAL DISTURBANCE: ICD-10-CM

## 2017-07-03 DIAGNOSIS — I50.42 CHRONIC COMBINED SYSTOLIC AND DIASTOLIC HEART FAILURE: ICD-10-CM

## 2017-07-03 DIAGNOSIS — E11.65 TYPE 2 DIABETES MELLITUS WITH HYPERGLYCEMIA, WITH LONG-TERM CURRENT USE OF INSULIN: ICD-10-CM

## 2017-07-03 DIAGNOSIS — N17.9 ACUTE RENAL FAILURE SUPERIMPOSED ON STAGE 3 CHRONIC KIDNEY DISEASE: ICD-10-CM

## 2017-07-03 DIAGNOSIS — E11.42 TYPE 2 DIABETES MELLITUS WITH DIABETIC POLYNEUROPATHY, WITH LONG-TERM CURRENT USE OF INSULIN: ICD-10-CM

## 2017-07-03 DIAGNOSIS — G30.1 LATE ONSET ALZHEIMER'S DISEASE WITHOUT BEHAVIORAL DISTURBANCE: ICD-10-CM

## 2017-07-03 DIAGNOSIS — N18.30 CHRONIC KIDNEY DISEASE, STAGE III (MODERATE): Chronic | ICD-10-CM

## 2017-07-03 DIAGNOSIS — F03.90 DEMENTIA WITHOUT BEHAVIORAL DISTURBANCE, UNSPECIFIED DEMENTIA TYPE: ICD-10-CM

## 2017-07-03 DIAGNOSIS — Z79.4 TYPE 2 DIABETES MELLITUS WITH STAGE 3 CHRONIC KIDNEY DISEASE, WITH LONG-TERM CURRENT USE OF INSULIN: ICD-10-CM

## 2017-07-03 DIAGNOSIS — M10.362 ACUTE GOUT DUE TO RENAL IMPAIRMENT INVOLVING LEFT KNEE: ICD-10-CM

## 2017-07-03 DIAGNOSIS — R41.82 ALTERED MENTAL STATUS: Primary | ICD-10-CM

## 2017-07-03 DIAGNOSIS — N18.30 ACUTE RENAL FAILURE SUPERIMPOSED ON STAGE 3 CHRONIC KIDNEY DISEASE: ICD-10-CM

## 2017-07-03 PROBLEM — M25.462 EFFUSION OF LEFT KNEE: Status: ACTIVE | Noted: 2017-07-03

## 2017-07-03 PROBLEM — M10.9 ACUTE GOUT OF LEFT KNEE: Status: ACTIVE | Noted: 2017-07-03

## 2017-07-03 LAB
ABO + RH BLD: NORMAL
ALBUMIN SERPL BCP-MCNC: 2 G/DL
ALBUMIN SERPL BCP-MCNC: 3.1 G/DL
ALP SERPL-CCNC: 142 U/L
ALP SERPL-CCNC: 91 U/L
ALT SERPL W/O P-5'-P-CCNC: 14 U/L
ALT SERPL W/O P-5'-P-CCNC: 24 U/L
AMORPH CRY UR QL COMP ASSIST: NORMAL
ANION GAP SERPL CALC-SCNC: 10 MMOL/L
ANION GAP SERPL CALC-SCNC: 12 MMOL/L
ANION GAP SERPL CALC-SCNC: 8 MMOL/L
APTT BLDCRRT: 27.8 SEC
AST SERPL-CCNC: 15 U/L
AST SERPL-CCNC: 28 U/L
BACTERIA #/AREA URNS AUTO: ABNORMAL /HPF
BACTERIA #/AREA URNS AUTO: NORMAL /HPF
BASOPHILS # BLD AUTO: 0 K/UL
BASOPHILS # BLD AUTO: 0.01 K/UL
BASOPHILS # BLD AUTO: 0.01 K/UL
BASOPHILS NFR BLD: 0 %
BASOPHILS NFR BLD: 0.1 %
BASOPHILS NFR BLD: 0.1 %
BILIRUB SERPL-MCNC: 0.8 MG/DL
BILIRUB SERPL-MCNC: 1.2 MG/DL
BILIRUB UR QL STRIP: NEGATIVE
BILIRUB UR QL STRIP: NEGATIVE
BLD GP AB SCN CELLS X3 SERPL QL: NORMAL
BNP SERPL-MCNC: 19 PG/ML
BODY FLD TYPE: ABNORMAL
BUN SERPL-MCNC: 28 MG/DL
BUN SERPL-MCNC: 32 MG/DL
BUN SERPL-MCNC: 36 MG/DL (ref 6–30)
BUN SERPL-MCNC: 37 MG/DL
CALCIUM SERPL-MCNC: 10 MG/DL
CALCIUM SERPL-MCNC: 6.4 MG/DL
CALCIUM SERPL-MCNC: 9.8 MG/DL
CHLORIDE SERPL-SCNC: 107 MMOL/L
CHLORIDE SERPL-SCNC: 108 MMOL/L
CHLORIDE SERPL-SCNC: 109 MMOL/L (ref 95–110)
CHLORIDE SERPL-SCNC: 120 MMOL/L
CLARITY UR REFRACT.AUTO: CLEAR
CLARITY UR REFRACT.AUTO: CLEAR
CO2 SERPL-SCNC: 18 MMOL/L
CO2 SERPL-SCNC: 22 MMOL/L
CO2 SERPL-SCNC: 24 MMOL/L
COLOR UR AUTO: YELLOW
COLOR UR AUTO: YELLOW
CREAT SERPL-MCNC: 0.9 MG/DL
CREAT SERPL-MCNC: 1.4 MG/DL (ref 0.5–1.4)
CREAT SERPL-MCNC: 1.5 MG/DL
CREAT SERPL-MCNC: 1.5 MG/DL
CRP SERPL-MCNC: 38 MG/L
CRYSTALS FLD MICRO: POSITIVE
DIFFERENTIAL METHOD: ABNORMAL
EOSINOPHIL # BLD AUTO: 0 K/UL
EOSINOPHIL NFR BLD: 0 %
ERYTHROCYTE [DISTWIDTH] IN BLOOD BY AUTOMATED COUNT: 12.8 %
ERYTHROCYTE [DISTWIDTH] IN BLOOD BY AUTOMATED COUNT: 12.9 %
ERYTHROCYTE [DISTWIDTH] IN BLOOD BY AUTOMATED COUNT: 13.1 %
ERYTHROCYTE [SEDIMENTATION RATE] IN BLOOD BY WESTERGREN METHOD: 61 MM/HR
EST. GFR  (AFRICAN AMERICAN): 48.7 ML/MIN/1.73 M^2
EST. GFR  (AFRICAN AMERICAN): 48.7 ML/MIN/1.73 M^2
EST. GFR  (AFRICAN AMERICAN): >60 ML/MIN/1.73 M^2
EST. GFR  (NON AFRICAN AMERICAN): 42.1 ML/MIN/1.73 M^2
EST. GFR  (NON AFRICAN AMERICAN): 42.1 ML/MIN/1.73 M^2
EST. GFR  (NON AFRICAN AMERICAN): >60 ML/MIN/1.73 M^2
GLUCOSE SERPL-MCNC: 178 MG/DL
GLUCOSE SERPL-MCNC: 218 MG/DL
GLUCOSE SERPL-MCNC: 237 MG/DL (ref 70–110)
GLUCOSE SERPL-MCNC: 257 MG/DL
GLUCOSE UR QL STRIP: ABNORMAL
GLUCOSE UR QL STRIP: ABNORMAL
GRAM STN SPEC: NORMAL
GRAM STN SPEC: NORMAL
HCT VFR BLD AUTO: 28.2 %
HCT VFR BLD AUTO: 41.6 %
HCT VFR BLD AUTO: 43.3 %
HCT VFR BLD CALC: 45 %PCV (ref 36–54)
HGB BLD-MCNC: 14 G/DL
HGB BLD-MCNC: 14.3 G/DL
HGB BLD-MCNC: 9.4 G/DL
HGB UR QL STRIP: ABNORMAL
HGB UR QL STRIP: ABNORMAL
HYALINE CASTS UR QL AUTO: 1 /LPF
HYALINE CASTS UR QL AUTO: 4 /LPF
INR PPP: 1.3
KETONES UR QL STRIP: ABNORMAL
KETONES UR QL STRIP: NEGATIVE
LACTATE SERPL-SCNC: 0.9 MMOL/L
LEUKOCYTE ESTERASE UR QL STRIP: NEGATIVE
LEUKOCYTE ESTERASE UR QL STRIP: NEGATIVE
LIPASE SERPL-CCNC: 41 U/L
LYMPHOCYTES # BLD AUTO: 1 K/UL
LYMPHOCYTES # BLD AUTO: 1.4 K/UL
LYMPHOCYTES # BLD AUTO: 1.8 K/UL
LYMPHOCYTES NFR BLD: 12.7 %
LYMPHOCYTES NFR BLD: 13.2 %
LYMPHOCYTES NFR BLD: 14.8 %
MAGNESIUM SERPL-MCNC: 1.2 MG/DL
MAGNESIUM SERPL-MCNC: 1.7 MG/DL
MCH RBC QN AUTO: 26.2 PG
MCH RBC QN AUTO: 27.4 PG
MCH RBC QN AUTO: 28 PG
MCHC RBC AUTO-ENTMCNC: 32.3 %
MCHC RBC AUTO-ENTMCNC: 33.3 %
MCHC RBC AUTO-ENTMCNC: 34.4 %
MCV RBC AUTO: 81 FL
MCV RBC AUTO: 81 FL
MCV RBC AUTO: 82 FL
MICROSCOPIC COMMENT: ABNORMAL
MICROSCOPIC COMMENT: NORMAL
MONOCYTES # BLD AUTO: 0.6 K/UL
MONOCYTES # BLD AUTO: 0.7 K/UL
MONOCYTES # BLD AUTO: 0.8 K/UL
MONOCYTES NFR BLD: 6.1 %
MONOCYTES NFR BLD: 6.4 %
MONOCYTES NFR BLD: 8.3 %
NEUTROPHILS # BLD AUTO: 6.1 K/UL
NEUTROPHILS # BLD AUTO: 9 K/UL
NEUTROPHILS # BLD AUTO: 9.4 K/UL
NEUTROPHILS NFR BLD: 78.2 %
NEUTROPHILS NFR BLD: 78.4 %
NEUTROPHILS NFR BLD: 81.1 %
NITRITE UR QL STRIP: NEGATIVE
NITRITE UR QL STRIP: NEGATIVE
PH UR STRIP: 5 [PH] (ref 5–8)
PH UR STRIP: 5 [PH] (ref 5–8)
PHOSPHATE SERPL-MCNC: 3.6 MG/DL
PLATELET # BLD AUTO: 161 K/UL
PLATELET # BLD AUTO: 249 K/UL
PLATELET # BLD AUTO: 281 K/UL
PMV BLD AUTO: 10.9 FL
PMV BLD AUTO: 11.3 FL
PMV BLD AUTO: 11.9 FL
POC IONIZED CALCIUM: 1 MMOL/L (ref 1.06–1.42)
POC TCO2 (MEASURED): 21 MMOL/L (ref 23–29)
POCT GLUCOSE: 222 MG/DL (ref 70–110)
POCT GLUCOSE: 266 MG/DL (ref 70–110)
POCT GLUCOSE: 335 MG/DL (ref 70–110)
POTASSIUM BLD-SCNC: 4.3 MMOL/L (ref 3.5–5.1)
POTASSIUM SERPL-SCNC: 3.2 MMOL/L
POTASSIUM SERPL-SCNC: 4.3 MMOL/L
POTASSIUM SERPL-SCNC: 5.2 MMOL/L
PROCALCITONIN SERPL IA-MCNC: 0.09 NG/ML
PROT SERPL-MCNC: 4.9 G/DL
PROT SERPL-MCNC: 8.1 G/DL
PROT UR QL STRIP: NEGATIVE
PROT UR QL STRIP: NEGATIVE
PROTHROMBIN TIME: 13 SEC
RBC # BLD AUTO: 3.43 M/UL
RBC # BLD AUTO: 5.11 M/UL
RBC # BLD AUTO: 5.34 M/UL
RBC #/AREA URNS AUTO: 1 /HPF (ref 0–4)
RBC #/AREA URNS AUTO: 2 /HPF (ref 0–4)
SAMPLE: ABNORMAL
SODIUM BLD-SCNC: 143 MMOL/L (ref 136–145)
SODIUM SERPL-SCNC: 141 MMOL/L
SODIUM SERPL-SCNC: 142 MMOL/L
SODIUM SERPL-SCNC: 146 MMOL/L
SP GR UR STRIP: 1.01 (ref 1–1.03)
SP GR UR STRIP: 1.01 (ref 1–1.03)
SQUAMOUS #/AREA URNS AUTO: 0 /HPF
TROPONIN I SERPL DL<=0.01 NG/ML-MCNC: 0.02 NG/ML
TSH SERPL DL<=0.005 MIU/L-ACNC: 0.64 UIU/ML
URATE SERPL-MCNC: 5.4 MG/DL
URN SPEC COLLECT METH UR: ABNORMAL
URN SPEC COLLECT METH UR: ABNORMAL
UROBILINOGEN UR STRIP-ACNC: NEGATIVE EU/DL
UROBILINOGEN UR STRIP-ACNC: NEGATIVE EU/DL
WBC # BLD AUTO: 11.12 K/UL
WBC # BLD AUTO: 11.93 K/UL
WBC # BLD AUTO: 7.73 K/UL
WBC #/AREA URNS AUTO: 1 /HPF (ref 0–5)
WBC #/AREA URNS AUTO: 1 /HPF (ref 0–5)
YEAST UR QL AUTO: ABNORMAL
YEAST UR QL AUTO: NORMAL

## 2017-07-03 PROCEDURE — 84100 ASSAY OF PHOSPHORUS: CPT

## 2017-07-03 PROCEDURE — 87102 FUNGUS ISOLATION CULTURE: CPT

## 2017-07-03 PROCEDURE — 99285 EMERGENCY DEPT VISIT HI MDM: CPT | Mod: ,,, | Performed by: EMERGENCY MEDICINE

## 2017-07-03 PROCEDURE — 85730 THROMBOPLASTIN TIME PARTIAL: CPT

## 2017-07-03 PROCEDURE — 0S9D3ZX DRAINAGE OF LEFT KNEE JOINT, PERCUTANEOUS APPROACH, DIAGNOSTIC: ICD-10-PCS | Performed by: ORTHOPAEDIC SURGERY

## 2017-07-03 PROCEDURE — 80048 BASIC METABOLIC PNL TOTAL CA: CPT

## 2017-07-03 PROCEDURE — 84443 ASSAY THYROID STIM HORMONE: CPT

## 2017-07-03 PROCEDURE — 86850 RBC ANTIBODY SCREEN: CPT

## 2017-07-03 PROCEDURE — 84484 ASSAY OF TROPONIN QUANT: CPT

## 2017-07-03 PROCEDURE — 84550 ASSAY OF BLOOD/URIC ACID: CPT

## 2017-07-03 PROCEDURE — 25000003 PHARM REV CODE 250: Performed by: HOSPITALIST

## 2017-07-03 PROCEDURE — 93005 ELECTROCARDIOGRAM TRACING: CPT

## 2017-07-03 PROCEDURE — 85025 COMPLETE CBC W/AUTO DIFF WBC: CPT | Mod: 91

## 2017-07-03 PROCEDURE — 36415 COLL VENOUS BLD VENIPUNCTURE: CPT

## 2017-07-03 PROCEDURE — 99316 NF DSCHRG MGMT 30 MIN+: CPT | Mod: ,,, | Performed by: HOSPITALIST

## 2017-07-03 PROCEDURE — 97535 SELF CARE MNGMENT TRAINING: CPT

## 2017-07-03 PROCEDURE — 99309 SBSQ NF CARE MODERATE MDM 30: CPT | Mod: ,,, | Performed by: NURSE PRACTITIONER

## 2017-07-03 PROCEDURE — 81003 URINALYSIS AUTO W/O SCOPE: CPT

## 2017-07-03 PROCEDURE — 93010 ELECTROCARDIOGRAM REPORT: CPT | Mod: ,,, | Performed by: INTERNAL MEDICINE

## 2017-07-03 PROCEDURE — 18500000 HC LEAVE OF ABSENCE HOSPITAL SERVICES

## 2017-07-03 PROCEDURE — 83690 ASSAY OF LIPASE: CPT

## 2017-07-03 PROCEDURE — 11000001 HC ACUTE MED/SURG PRIVATE ROOM

## 2017-07-03 PROCEDURE — 81001 URINALYSIS AUTO W/SCOPE: CPT | Mod: 91

## 2017-07-03 PROCEDURE — 25000003 PHARM REV CODE 250: Performed by: PHYSICIAN ASSISTANT

## 2017-07-03 PROCEDURE — 87040 BLOOD CULTURE FOR BACTERIA: CPT

## 2017-07-03 PROCEDURE — 99900058 HC 022 PAID UNDER SNF PPS

## 2017-07-03 PROCEDURE — 99285 EMERGENCY DEPT VISIT HI MDM: CPT | Mod: 25

## 2017-07-03 PROCEDURE — 25000003 PHARM REV CODE 250: Performed by: EMERGENCY MEDICINE

## 2017-07-03 PROCEDURE — 86900 BLOOD TYPING SEROLOGIC ABO: CPT

## 2017-07-03 PROCEDURE — 87086 URINE CULTURE/COLONY COUNT: CPT

## 2017-07-03 PROCEDURE — 96361 HYDRATE IV INFUSION ADD-ON: CPT

## 2017-07-03 PROCEDURE — 85610 PROTHROMBIN TIME: CPT

## 2017-07-03 PROCEDURE — 83735 ASSAY OF MAGNESIUM: CPT | Mod: 91

## 2017-07-03 PROCEDURE — 84145 PROCALCITONIN (PCT): CPT

## 2017-07-03 PROCEDURE — 89060 EXAM SYNOVIAL FLUID CRYSTALS: CPT

## 2017-07-03 PROCEDURE — 87205 SMEAR GRAM STAIN: CPT

## 2017-07-03 PROCEDURE — 83880 ASSAY OF NATRIURETIC PEPTIDE: CPT

## 2017-07-03 PROCEDURE — 85025 COMPLETE CBC W/AUTO DIFF WBC: CPT

## 2017-07-03 PROCEDURE — 85651 RBC SED RATE NONAUTOMATED: CPT

## 2017-07-03 PROCEDURE — 86140 C-REACTIVE PROTEIN: CPT

## 2017-07-03 PROCEDURE — 87040 BLOOD CULTURE FOR BACTERIA: CPT | Mod: 59

## 2017-07-03 PROCEDURE — 80053 COMPREHEN METABOLIC PANEL: CPT

## 2017-07-03 PROCEDURE — 83735 ASSAY OF MAGNESIUM: CPT

## 2017-07-03 PROCEDURE — 87075 CULTR BACTERIA EXCEPT BLOOD: CPT

## 2017-07-03 PROCEDURE — 51702 INSERT TEMP BLADDER CATH: CPT

## 2017-07-03 PROCEDURE — 25000003 PHARM REV CODE 250: Performed by: NURSE PRACTITIONER

## 2017-07-03 PROCEDURE — 81001 URINALYSIS AUTO W/SCOPE: CPT

## 2017-07-03 PROCEDURE — 83605 ASSAY OF LACTIC ACID: CPT

## 2017-07-03 PROCEDURE — 87070 CULTURE OTHR SPECIMN AEROBIC: CPT

## 2017-07-03 PROCEDURE — 89051 BODY FLUID CELL COUNT: CPT

## 2017-07-03 PROCEDURE — 87116 MYCOBACTERIA CULTURE: CPT

## 2017-07-03 PROCEDURE — 92526 ORAL FUNCTION THERAPY: CPT

## 2017-07-03 RX ORDER — GUAIFENESIN 100 MG/5ML
200 SOLUTION ORAL EVERY 6 HOURS PRN
Status: DISCONTINUED | OUTPATIENT
Start: 2017-07-03 | End: 2017-07-04 | Stop reason: HOSPADM

## 2017-07-03 RX ORDER — SODIUM CHLORIDE 9 MG/ML
INJECTION, SOLUTION INTRAVENOUS CONTINUOUS
Status: DISCONTINUED | OUTPATIENT
Start: 2017-07-03 | End: 2017-07-04 | Stop reason: HOSPADM

## 2017-07-03 RX ORDER — DICLOFENAC SODIUM 10 MG/G
GEL TOPICAL 3 TIMES DAILY PRN
Status: DISCONTINUED | OUTPATIENT
Start: 2017-07-03 | End: 2017-07-04 | Stop reason: HOSPADM

## 2017-07-03 RX ORDER — INSULIN ASPART 100 [IU]/ML
9 INJECTION, SOLUTION INTRAVENOUS; SUBCUTANEOUS
Status: DISCONTINUED | OUTPATIENT
Start: 2017-07-03 | End: 2017-07-04 | Stop reason: HOSPADM

## 2017-07-03 RX ADMIN — ATORVASTATIN CALCIUM 40 MG: 20 TABLET, FILM COATED ORAL at 09:07

## 2017-07-03 RX ADMIN — SODIUM CHLORIDE 500 ML: 0.9 INJECTION, SOLUTION INTRAVENOUS at 04:07

## 2017-07-03 RX ADMIN — METOPROLOL SUCCINATE 25 MG: 25 TABLET, EXTENDED RELEASE ORAL at 09:07

## 2017-07-03 RX ADMIN — INSULIN ASPART 4 UNITS: 100 INJECTION, SOLUTION INTRAVENOUS; SUBCUTANEOUS at 12:07

## 2017-07-03 RX ADMIN — LOSARTAN POTASSIUM 25 MG: 25 TABLET, FILM COATED ORAL at 09:07

## 2017-07-03 RX ADMIN — MEMANTINE HYDROCHLORIDE 10 MG: 10 TABLET ORAL at 09:07

## 2017-07-03 RX ADMIN — INSULIN ASPART 9 UNITS: 100 INJECTION, SOLUTION INTRAVENOUS; SUBCUTANEOUS at 12:07

## 2017-07-03 RX ADMIN — SODIUM CHLORIDE: 0.9 INJECTION, SOLUTION INTRAVENOUS at 12:07

## 2017-07-03 RX ADMIN — AMLODIPINE BESYLATE 10 MG: 10 TABLET ORAL at 09:07

## 2017-07-03 RX ADMIN — INSULIN ASPART 2 UNITS: 100 INJECTION, SOLUTION INTRAVENOUS; SUBCUTANEOUS at 09:07

## 2017-07-03 RX ADMIN — DONEPEZIL HYDROCHLORIDE 10 MG: 10 TABLET, FILM COATED ORAL at 09:07

## 2017-07-03 RX ADMIN — CLOPIDOGREL BISULFATE 75 MG: 75 TABLET ORAL at 09:07

## 2017-07-03 RX ADMIN — INSULIN ASPART 7 UNITS: 100 INJECTION, SOLUTION INTRAVENOUS; SUBCUTANEOUS at 09:07

## 2017-07-03 RX ADMIN — BENZONATATE 100 MG: 100 CAPSULE ORAL at 05:07

## 2017-07-03 NOTE — ASSESSMENT & PLAN NOTE
Patient is unresponsive today.  Family is stating this is a change from yesterday.  Not sure if related to Tramadol dose or something else.  Per family Tramadol is new and he is not use to using medications such as this.  Dr. Hoffman called to room to examine and recommends sending back to ED.  Cont Plavix and atorvastatin for secondary stroke prevention.   Cont DVT prophylaxis with Lovenox     Future Appointments  Date Time Provider Department Center   7/25/2017 11:00 AM Chayo Barragan MD [facility] STROKE [facility]

## 2017-07-03 NOTE — ASSESSMENT & PLAN NOTE
BG levels uncontrolled.   PO intake is variable  A1c is 9.8, CBG is elevated.  Will increase Novolog and levemir today.   ADA diet

## 2017-07-03 NOTE — PT/OT/SLP PROGRESS
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 HOB to 90 degrees, Small bites/sips, Alternating bites/sips, 1 bite/sip at a time, Check for pocketing/oral residue, Meds whole buried in puree (MAY NEED VERBAL CUES TO SWALLOW), Eliminate distractions and Assistance with meals    SLP Treatment Date: 07/03/17  Speech Start Time: 0910     Speech Stop Time: 0930     Speech Total (min): 20 min       TREATMENT BILLABLE MINUTES:  Treatment Swallowing Dysfunction 12 and Seld Care/Home Management Training 8    Has the patient been evaluated by SLP for swallowing? : Yes  Keep patient NPO?: No   General Precautions: Standard, aspiration, diabetic, fall, seizure          Subjective:  "Cold." pt with very limited responses today due to poor arousal.  He did respond, however, when asked how he felt after wife placed a wet wash cloth over face in effort to increase alertness. SLP to initiate speech/language/cognitive evaluation, but pt was somnolent.  Pt's wife and nurse attribute somnolence to effect of pain medications received last night.           Objective:      SLP entered room with pt asleep in bed and wife present. Pt's wife stated pt's PO intake was poor for breakfast due to somnolence. She stated pt drank water and tea without difficulty, but orally expelled minimal amount of food fed to him by wife.  Pt roused to SLP's verbal and tactile stimulation, but did not maintain alertness without direct stimulation. PCT entered to change/clean pt, as which time SLP stepped out of room. When SLP returned, pt was not responding to stimuli as well and responses were very limited. Pt accepted and tolerated cup sips of water fed to him by SLP. Wife wiped pt's face with a damp wash " cloth. Both efforts to increase wakefulness and responses were unsuccessful.  Pt stated name and  when nurse asked, but had not when asked on multiple other occasions.  He did not clearly state the name of the place when asked to orient to place.  Pt followed 2 out of 3 simple commands with need for model and max cues for 1/3.  He answered simple yes/no q's correctly on 1/3 trials.  Pt accepted 1/3-1/2 tsp bites of applesauce as trials prior to nurse administered medications crushed and buried in applesauce.  Meds were administered crushed/buried with mod cues to initiate pharyngeal swallows.  Followed by cup sips of water. No overt s/s of aspiration observed.  SLP encouraged wife to refrain from attempting to feed pt if he is unable to remain alert and intentionally accept PO trials. SLP also demonstrated to wife how to palpate for hyolaryngeal rise to ensure that pt has actually triggered a pharyngeal swallow.  Understanding was demonstrated,.     Assessment:  Emmett Perez is a 84 y.o. 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  Pt's swallowing safety and function are further diminished at this time due to dec'd alertness.     Diet recommendations:   Solid Diet Level: Regular    Liquid Diet Level: Thin        Discharge recommendations: Discharge Facility/Level Of Care Needs:  (TBD)     Goals:    SLP Goals        Problem: SLP Goal    Goal Priority Disciplines Outcome   SLP Goal     SLP Ongoing (interventions implemented as appropriate)   Description:  Speech Language Pathology Goals  Goals expected to be met by :  1. Pt will tolerate regular consistency diet and thin liquids without s/s of aspiration.  2. Pt will participate in [id number] evaluation to determine need for further intervention.                         Plan:   Patient to be seen Therapy Frequency: 5 x/week   Plan of Care expires: 17  Plan of  Care reviewed with: patient, spouse  SLP Follow-up?: Yes              STACIE Starr, CCC-SLP  07/03/2017     STACIE Starr, CCC-SLP  Speech Language Pathologist  [phone]  7/3/2017

## 2017-07-03 NOTE — PT/OT/SLP DISCHARGE
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  Please refer to prior OT note dated on 6/29/17 for functional status.     Assessment:   Patient has not met goals.  GOALS:    Occupational Therapy Goals     Not on file          Multidisciplinary Problems (Resolved)        Problem: Occupational Therapy Goal    Goal Priority Disciplines Outcome Interventions   Occupational Therapy Goal   (Resolved)     OT, PT/OT Outcome(s) achieved    Description:  Goals to be met by 7/6/2017:    1.  Sit eob with CG A for dynamic sitting balance during grooming tasks - not met  2.  CG A to complete grooming tasks in supported sitting - not met  3.  Complete UB dressing with min a - not met  4.  Don shorts/underpants or pants with min a - not met  5.  Toilet self with min a - not met  6.  Toilet or BSC transfer with min a - not met  7.  Supine to sit with minimal (A) - not met  8.  Rolling to (B) directions with Stand by assistance - not met                  Reasons for Discontinuation of Therapy Services  Transfer to alternate level of care.      Plan:  Patient Discharged to: Skilled Nursing Facility.    AIXA Murrell 7/3/2017

## 2017-07-03 NOTE — PROGRESS NOTES
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 male who has a past medical history of Alzheimer disease; chronic kidney disease stage 3; Hypertension; Seizure; Stroke; and Type 2 diabetes mellitus with diabetic neuropathy presented with acute left sided weakness. Imaging in the ED was consistent with ischemic stroke in right thalamocapsular territory consistent w/ small vessel infarct. Patient was admitted to neurology and treated with secondary stroke prevention. He did not receive TPA due to being outside therapeutic window. Post stroke patient was mainly lethargic and encephalopathic.    Patient has been working with PT/OT who recommend rehab for further balance/mobility training. Patients insurance denied rehab and thus being sent to SNF. Patient lives in [place] with his wife in a single story home.  Prior to admission, he was independent with mobility and transfer with use of straight cane. Per MD H&P, history mainly taken from wife and chart review. Patient is lethargic and not able to answer questions. Per Dr. Hoffman H&P, he followed minimal commands when awake.     Today, son and wife at bedside.  Son expressed concern in his change in mentation.  States noticed a difference yesterday where patient did not recognize him or his sister.  Son is not sure if his change in mentation is due to his CVA or from the tramadol he had gotten for his complaint of knee pain.      Interval History:   7/3/17  Patient seen at bedside with wife and son present.  They expressed concern about his change in mentation  since yesterday.  Son explained his dad was not able to recognize him or his sister yesterday but then symptoms resolved.  Today, he is lethargic.  He does respond to pain when grabbing his left knee.  He is unable to lift his arms or open his eyes to verbal and tactile commands.  Son feels he should be readmitted to monitor in an acute setting.  I advised family, I will discuss their issues with Dr. Hoffman.    1045 AM discussed case and family issues with Dr. Hoffman.  He reviewed chart, BUN/Cr is up, WBC went from 6.14 to 11.12.  He is currently afebrile and recent knee x-ray shows a small left suprapatellar synovial complex/effusion.  Dr. Hoffman is recommending UA, urine and blood culture, CXR and IVF.  He said he will go and see patient to examine.    1430 update Dr. Hoffman reports patient is difficult to arise and is a change from his initial examination.  He is recommending to send the patient to the ED for evaluation.  Called report to Dr. Aly at [facility].        Review of Systems   Unable to perform ROS: Patient unresponsive     Objective:     Vital Signs (Most Recent):  Temp: 97.7 °F (36.5 °C) (07/03/17 0910)  Pulse: 104 (07/03/17 0913)  Resp: 19 (07/03/17 0910)  BP: (!) 140/82 (07/03/17 0913)  SpO2: 98 % (07/03/17 0910) Vital Signs (24h Range):  Temp:  [97.6 °F (36.4 °C)-97.7 °F (36.5 °C)] 97.7 °F (36.5 °C)  Pulse:  [104-117] 104  Resp:  [19-20] 19  SpO2:  [98 %] 98 %  BP: (140-170)/(78-88) 140/82     Weight: 80.2 kg (176 lb 12.9 oz)  Body mass index is 26.11 kg/m².    Intake/Output Summary (Last 24 hours) at 07/03/17 1501  Last data filed at 07/03/17 1300   Gross per 24 hour   Intake              280 ml   Output                0 ml   Net              280 ml      Physical Exam   Constitutional: He appears well-developed and well-nourished.   Cardiovascular: Normal rate, regular rhythm, normal heart sounds and intact distal pulses.  Exam reveals no gallop and no friction rub.    No murmur  "heard.  Pulmonary/Chest: Effort normal and breath sounds normal. No respiratory distress. He has no wheezes. He has no rales.   Abdominal: Soft. Bowel sounds are normal. He exhibits no distension.   Musculoskeletal: He exhibits tenderness. He exhibits no edema.   Patient pulls from pain with palpating left knee.     Neurological:   Patient is lethargic and does not follow commands.  He does not respond when calling his name or tapping on the shoulder.  When palpating left knee, he pulls away and states "ouch"  No other response from patient.   Skin: Skin is warm and dry.       Significant Labs:   A1C:   Recent Labs  Lab 06/22/17  0017   HGBA1C 9.8*     Blood Culture: No results for input(s): LABBLOO in the last 48 hours.  CBC:   Recent Labs  Lab 07/03/17  0530   WBC 11.12   HGB 14.0   HCT 43.3        CMP:   Recent Labs  Lab 07/03/17  0530      K 4.3      CO2 22*   *   BUN 32*   CREATININE 1.5*   CALCIUM 10.0   ANIONGAP 12   EGFRNONAA 42.1*     POCT Glucose:   Recent Labs  Lab 07/02/17  2105 07/03/17  0729 07/03/17  1207   POCTGLUCOSE 287* 266* 335*     Urine Culture: No results for input(s): LABURIN in the last 48 hours.  Urine Studies: No results for input(s): COLORU, APPEARANCEUA, PHUR, SPECGRAV, PROTEINUA, GLUCUA, KETONESU, BILIRUBINUA, OCCULTUA, NITRITE, UROBILINOGEN, LEUKOCYTESUR, RBCUA, WBCUA, BACTERIA, SQUAMEPITHEL, HYALINECASTS in the last 48 hours.    Invalid input(s): WRIGHTSUR    Significant Imaging:   Imaging Results          X-Ray Chest 1 View (Final result)  Result time 07/03/17 14:20:31   Procedure changed from X-Ray Chest PA And Lateral     Final result by Scott Lane MD (07/03/17 14:20:31)                 Impression:        No definitive consolidations. Left lung base atelectasis.      Electronically signed by: SCTOT LANE MD  Date:     07/03/17  Time:    14:20              Narrative:    PORTABLE AP CHEST:      Comparison: 6/25/17    Findings:     No definitive " consolidations. Left lung base atelectasis. There is no pleural effusion or pneumothorax. The cardiac silhouette isnormal in size.  There are no acute bony abnormalities.                             X-Ray Knee 3 View Left (Final result)  Result time 07/02/17 18:59:58    Final result by Jose Bedolla MD (07/02/17 18:59:58)                 Impression:      1. Small left suprapatellar synovial complex/effusion.    2. Mild chondrocalcinosis in the lateral compartment of the femoral-tibial articulation, possibly degenerative or crystal deposition disease.      Electronically signed by: JOSE BEDOLLA MD  Date:     07/02/17  Time:    18:59              Narrative:    Left knee, 3 views    Comparison: None    Findings: Small left suprapatellar synovial complex/effusion. No displaced fracture or subluxation.    Mild chondrocalcinosis in the lateral compartment of the femoral tibial articulation.    Mild patellofemoral and femoral-tibial DJD. Noninflammatory enthesopathic change at the insertion of quadriceps tendon on the patella.                                Assessment/Plan:      Effusion of left knee    Patient had x-ray of his left knee after admission to SNF.  Per x-ray report he has an effusion.  Dr. Hoffman recommends he see Ortho.   Kindly ask ED team and/or admitting team to consult with Ortho once patient arrives.        Debility    PT/OT and speech has been consulted.            Combined systolic and diastolic cardiac dysfunction    Maintain euvolemia by monitoring I/O's and daily weights.  No weight recorded for today.  Fluid restriction (2 liters/24 hours)  Low Na diet  Cont ARB, BB. Not on a diuretic.            Essential hypertension    BP is 140/82 today.  Currently on Norvasc, Hydralazine, Losartan and Toprol XL.  Adjust regimen if no improvement.         Dementia without behavioral disturbance    Cont donepezil and memantine to treat  Delirium precautions.         Type 2 diabetes mellitus with diabetic  polyneuropathy, with long-term current use of insulin    BG levels uncontrolled.   PO intake is variable  A1c is 9.8, CBG is elevated.  Will increase Novolog and levemir today.   ADA diet          Monoclonal paraproteinemia    Chronic and stable  Follow up with oncology as outpatient.         Chronic kidney disease, stage III (moderate)    Sr Cr up today as well as BUN.  Will give 1 liter of NS at 100 cc/hr.  Cont to monitor   Avoid nephrotoxins.    Monitor urine output to assure that no obstruction precipitates worsening in GFR.             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  Family is stating this is a change from yesterday.  Not sure if related to Tramadol dose or something else.  Dr. Hoffman called to room to examine and recommends sending back to ED.  Cont Plavix and atorvastatin for secondary stroke prevention.   Cont DVT prophylaxis with Lovenox     Future Appointments  Date Time Provider Department Center   7/25/2017 11:00 AM Chayo Barragan MD [facility] STROKE Eb Elaine Myers NP  [facility]

## 2017-07-03 NOTE — ASSESSMENT & PLAN NOTE
Patient had x-ray of his left knee after admission to SNF.  Per x-ray report he has an effusion.  Dr. Hoffman recommends he see Ortho.   Kindly ask ED team and/or admitting team to consult with Ortho once patient arrives.

## 2017-07-03 NOTE — ASSESSMENT & PLAN NOTE
BG levels uncontrolled.   PO intake is variable  A1c is 9.8, CBG is elevated.  Will increase Novolog and levemir today.   ADA diet  Recommend admitting team to continue to address in acute setting.

## 2017-07-03 NOTE — ED PROVIDER NOTES
Encounter Date: 7/3/2017    SCRIBE #1 NOTE: I, Kiana Pate, am scribing for, and in the presence of, Dr. Nobles.       History     Chief Complaint   Patient presents with    Altered Mental Status     in SNF rehab post CVA 2 weeks ago - c/o knee pain yesterday and was given tramadol at 2230- started with AMS that has progressed to alert to only self.      Time patient was seen by the provider: 4:56 PM    The patient is a 84 y.o. male with hx of:CVA, HTN, DM II, Alzheimer disease, and CKD  that presents to the ED via EMS with a complaint of AMS since yesterday afternoon. Per relative, pt had a CVA and was at [facility] for rehab. They state that while he was in rehab yesterday, he complained of left knee pain and was given one dose of Tramadol. Relatives state yesterday around 1 PM he was unable to recognize anyone and seemed as if he was hallucinating. They state he was fine afterwards. However, at night he was again unable to recognize anyone and was unresponsive. Relatives express concern because they state at baseline patient is interactive, responsive and eats well. However, they note a significant change from yesterday morning as patient has not been responsive or eating well.       The history is provided by medical records and a relative.     Review of patient's allergies indicates:  No Known Allergies  Past Medical History:   Diagnosis Date    Alzheimer disease     CKD (chronic kidney disease) stage 3, GFR 30-59 ml/min     Hypertension     Seizure     Stroke     Type 2 diabetes mellitus with diabetic neuropathy      Past Surgical History:   Procedure Laterality Date    CERVICAL SPINE SURGERY  2007    CORONARY ANGIOPLASTY WITH STENT PLACEMENT      thyroid nodule  1997     No family history on file.  Social History   Substance Use Topics    Smoking status: Never Smoker    Smokeless tobacco: Not on file    Alcohol use Not on file     Review of Systems   Unable to perform ROS: Mental status  change       Physical Exam     Initial Vitals [07/03/17 1556]   BP Pulse Resp Temp SpO2   (!) 141/85 90 16 98.6 °F (37 °C) 97 %      MAP       103.67         Physical Exam    Nursing note and vitals reviewed.    Gen/Constitutional: Not interactive. Somnolent.  Head: Normocephalic, Atraumatic  Neck: supple, no masses or LAD  Eyes: PERRLA, conjunctiva clear  Ears, Nose and Throat: No rhinorrhea or stridor.  Cardiac: Reg Rhythm, No murmur  Pulmonary: CTA Bilat, no wheezes, rhonchi, rales.  GI: Abdomen soft, non-tender, non-distended; no rebound or guarding  : No CVA tenderness.  Musculoskeletal: Extremities warm, well perfused, no erythema, no edema. Left knee, no effusion, erythema or calor, no bony step-off.  Skin: No rashes  Neuro: Somnolent, resting with his eyes closed. Opened eyes with cold rag on his forehead. Will not answer questions.  Psych: Unable to assess secondary to AMS.      ED Course   Procedures  Labs Reviewed   CBC W/ AUTO DIFFERENTIAL - Abnormal; Notable for the following:        Result Value    RBC 3.43 (*)     Hemoglobin 9.4 (*)     Hematocrit 28.2 (*)     Gran% 78.2 (*)     Lymph% 13.2 (*)     All other components within normal limits   URINALYSIS, REFLEX TO URINE CULTURE - Abnormal; Notable for the following:     Glucose, UA 3+ (*)     Occult Blood UA 2+ (*)     All other components within normal limits    Narrative:     Preferred Collection Type->Urine, Clean Catch   PROTIME-INR - Abnormal; Notable for the following:     Prothrombin Time 13.0 (*)     INR 1.3 (*)     All other components within normal limits   COMPREHENSIVE METABOLIC PANEL - Abnormal; Notable for the following:     Sodium 146 (*)     Potassium 3.2 (*)     Chloride 120 (*)     CO2 18 (*)     Glucose 178 (*)     BUN, Bld 28 (*)     Calcium 6.4 (*)     Total Protein 4.9 (*)     Albumin 2.0 (*)     All other components within normal limits   URINALYSIS MICROSCOPIC - Abnormal; Notable for the following:     Hyaline Casts, UA 4 (*)      All other components within normal limits    Narrative:     Preferred Collection Type->Urine, Clean Catch   MAGNESIUM - Abnormal; Notable for the following:     Magnesium 1.2 (*)     All other components within normal limits   CBC W/ AUTO DIFFERENTIAL - Abnormal; Notable for the following:     MCV 81 (*)     Gran # 9.4 (*)     Gran% 78.4 (*)     Lymph% 14.8 (*)     All other components within normal limits    Narrative:     ADD-ON ESR #[id number] PER YUNIOR QUINTEROS MD 20:17 07/03/2017    COMPREHENSIVE METABOLIC PANEL - Abnormal; Notable for the following:     Potassium 5.2 (*)     Glucose 218 (*)     BUN, Bld 37 (*)     Creatinine 1.5 (*)     Albumin 3.1 (*)     Total Bilirubin 1.2 (*)     Alkaline Phosphatase 142 (*)     eGFR if  48.7 (*)     eGFR if non  42.1 (*)     All other components within normal limits   SEDIMENTATION RATE, MANUAL - Abnormal; Notable for the following:     Sed Rate 61 (*)     All other components within normal limits    Narrative:     ADD-ON ESR #[id number] PER YUNIOR QUINTEROS MD 20:17 07/03/2017    ISTAT PROCEDURE - Abnormal; Notable for the following:     POC Glucose 237 (*)     POC BUN 36 (*)     POC TCO2 (MEASURED) 21 (*)     POC Ionized Calcium 1.00 (*)     All other components within normal limits   CULTURE, BLOOD   CULTURE, BLOOD   CULTURE, ANAEROBIC   CULTURE, AEROBIC  (SPECIFY SOURCE)   AFB CULTURE & SMEAR   CULTURE, FUNGUS   GRAM STAIN   LACTIC ACID, PLASMA   APTT   B-TYPE NATRIURETIC PEPTIDE   TROPONIN I   LIPASE   TSH   SEDIMENTATION RATE, MANUAL   C-REACTIVE PROTEIN   URIC ACID   PROCALCITONIN   PROCALCITONIN   WBC & DIFF,BODY FLUID   TYPE & SCREEN   ISTAT CHEM8     CT Head Without Contrast   Final Result          1.  Evolving right thalamic infarction.  No detrimental interval change from prior examination.      2.  Chronic microvascular ischemic change.   ______________________________________       Electronically signed by resident:  FRANKLYN BLUM MD   Date:     07/03/17   Time:    18:16                  As the supervising and teaching physician, I personally reviewed the images and resident's interpretation and I agree with the findings.               Electronically signed by: VALORIE GLASS MD   Date:     07/03/17   Time:    18:31             EKG Readings: (Independently Interpreted)   EKG: NSR, no MARA's or STD's, non-specific twave pattern, no STEMI            Medical Decision Making:   History:   Old Medical Records: I decided to obtain old medical records.  Independently Interpreted Test(s):   I have ordered and independently interpreted EKG Reading(s) - see prior notes  Pt presents with AMS. I considered stroke, UTI, PNA, electrolyte abnormality, endocrine abnormality, medication side effect, gout, septic arthritis among other diagnoses. Plan to check labs, head CT. Pt already had CXR done earlier today which was unremarkable. Pt had XR left knee done yesterday which demonstrated small, left suprapatellar effusion and mild calcification deposits in lateral aspect of femorotibial articulation. Since the pt is much more altered compared to the way he was yesterday and his baseline, I did feel he would at least warrant admission to medicine for AMS.     Pt also on lovenox and plavix.  H/H drop since this AM on labs done here.  Will send Chem 8 to confirm is this is real or spurious.    If real, will do rectal exam to r/o GI bleed.    Repeat hematocrit is 45, thus I felt the first H&H we got today was spurious. On initial electrolyte, pt is hyponatremic and hypokalemic and hypocalcemic. Will send repeat electrolytes. Head CT demonstrates no new stroke. Ultimately I was unsure of the cause of his somnolence and AMS though felt it could be due to hyponatremia or tramadol side effects as he was given one dose of tramadol for his left knee pain. In regards to left knee pain, I consulted ortho and they will do an arthrocentesis of the left knee.  Results and recommendation pending - clinically felt left knee pain was likely gout vs inflammatory arthritis. Given AMS, I felt he warranted admission to medicine for further treatment and workup . REpeat electrolytes sent to confirm if spurious or not.  Medicine accepted.    Clinical Tests:  Labs Test(s) were ordered and reviewed by me.  Radiological study(s) were ordered and reviewed by me.  Medical test(s) were ordered and reviewed by me.                Scribe Attestation:   Scribe #1: I performed the above scribed service and the documentation accurately describes the services I performed. I attest to the accuracy of the note.    Attending Attestation:           Physician Attestation for Scribe:  Physician Attestation Statement for Scribe #1: I, Dr. Nobles, reviewed documentation, as scribed by Kiana Pate in my presence, and it is both accurate and complete.            [unfilled]     ED Course     Clinical Impression:   The encounter diagnosis was Altered mental status.    Disposition:   Disposition: Admitted                        Tony Nobles MD  07/03/17 8919

## 2017-07-03 NOTE — SUBJECTIVE & OBJECTIVE
Interval History:   7/3/17  Patient seen at bedside with wife and son present.  They expressed concern about his change in mentation since yesterday.  Son explained his dad was not able to recognize him or his sister yesterday but then symptoms resolved.  Today, he is lethargic.  He does respond to pain when grabbing his left knee.  He is unable to lift his arms or open his eyes to verbal and tactile commands.  Son feels he should be readmitted to monitor in an acute setting.  I advised family, I will discuss their issues with Dr. Hoffman.    1045 AM discussed case and family issues with Dr. Hoffman.  He reviewed chart, BUN/Cr is up, WBC went from 6.14 to 11.12.  He is currently afebrile and recent knee x-ray shows a small left suprapatellar synovial complex/effusion.  Dr. Hoffman is recommending UA, urine and blood culture, CXR and IVF.  He said he will go and see patient to examine.    1430 update Dr. Hoffman reports patient is difficult to arise and is a change from his initial examination.  He is recommending to send the patient to the ED for evaluation.  [place] [facility] [place]        Review of Systems   Unable to perform ROS: Patient unresponsive     Objective:     Vital Signs (Most Recent):  Temp: 97.7 °F (36.5 °C) (07/03/17 0910)  Pulse: 104 (07/03/17 0913)  Resp: 19 (07/03/17 0910)  BP: (!) 140/82 (07/03/17 0913)  SpO2: 98 % (07/03/17 0910) Vital Signs (24h Range):  Temp:  [97.6 °F (36.4 °C)-97.7 °F (36.5 °C)] 97.7 °F (36.5 °C)  Pulse:  [104-117] 104  Resp:  [19-20] 19  SpO2:  [98 %] 98 %  BP: (140-170)/(78-88) 140/82     Weight: 80.2 kg (176 lb 12.9 oz)  Body mass index is 26.11 kg/m².    Intake/Output Summary (Last 24 hours) at 07/03/17 1501  Last data filed at 07/03/17 1300   Gross per 24 hour   Intake              280 ml   Output                0 ml   Net              280 ml      Physical Exam   Constitutional: He appears well-developed and well-nourished.   Cardiovascular: Normal rate, regular  "rhythm, normal heart sounds and intact distal pulses.  Exam reveals no gallop and no friction rub.    No murmur heard.  Pulmonary/Chest: Effort normal and breath sounds normal. No respiratory distress. He has no wheezes. He has no rales.   Abdominal: Soft. Bowel sounds are normal. He exhibits no distension.   Musculoskeletal: He exhibits tenderness. He exhibits no edema.   Patient pulls from pain with palpating left knee.     Neurological:   Patient is lethargic and does not follow commands.  He does not respond when calling his name or tapping on the shoulder.  When palpating left knee, he pulls away and states "ouch"  No other response from patient.   Skin: Skin is warm and dry.       Significant Labs:   A1C:   Recent Labs  Lab 06/22/17  0017   HGBA1C 9.8*     Blood Culture: No results for input(s): LABBLOO in the last 48 hours.  CBC:   Recent Labs  Lab 07/03/17  0530   WBC 11.12   HGB 14.0   HCT 43.3        CMP:   Recent Labs  Lab 07/03/17  0530      K 4.3      CO2 22*   *   BUN 32*   CREATININE 1.5*   CALCIUM 10.0   ANIONGAP 12   EGFRNONAA 42.1*     POCT Glucose:   Recent Labs  Lab 07/02/17  2105 07/03/17  0729 07/03/17  1207   POCTGLUCOSE 287* 266* 335*     Urine Culture: No results for input(s): LABURIN in the last 48 hours.  Urine Studies: No results for input(s): COLORU, APPEARANCEUA, PHUR, SPECGRAV, PROTEINUA, GLUCUA, KETONESU, BILIRUBINUA, OCCULTUA, NITRITE, UROBILINOGEN, LEUKOCYTESUR, RBCUA, WBCUA, BACTERIA, SQUAMEPITHEL, HYALINECASTS in the last 48 hours.    Invalid input(s): WRIGHTSUR    Significant Imaging:   Imaging Results          X-Ray Chest 1 View (Final result)  Result time 07/03/17 14:20:31   Procedure changed from X-Ray Chest PA And Lateral     Final result by Scott Lane MD (07/03/17 14:20:31)                 Impression:        No definitive consolidations. Left lung base atelectasis.      Electronically signed by: SCOTT LANE MD  Date: "     07/03/17  Time:    14:20              Narrative:    PORTABLE AP CHEST:      Comparison: 6/25/17    Findings:     No definitive consolidations. Left lung base atelectasis. There is no pleural effusion or pneumothorax. The cardiac silhouette isnormal in size.  There are no acute bony abnormalities.                             X-Ray Knee 3 View Left (Final result)  Result time 07/02/17 18:59:58    Final result by Jose Bedolla MD (07/02/17 18:59:58)                 Impression:      1. Small left suprapatellar synovial complex/effusion.    2. Mild chondrocalcinosis in the lateral compartment of the femoral-tibial articulation, possibly degenerative or crystal deposition disease.      Electronically signed by: JOSE BEDOLLA MD  Date:     07/02/17  Time:    18:59              Narrative:    Left knee, 3 views    Comparison: None    Findings: Small left suprapatellar synovial complex/effusion. No displaced fracture or subluxation.    Mild chondrocalcinosis in the lateral compartment of the femoral tibial articulation.    Mild patellofemoral and femoral-tibial DJD. Noninflammatory enthesopathic change at the insertion of quadriceps tendon on the patella.

## 2017-07-03 NOTE — ASSESSMENT & PLAN NOTE
Sr Cr up today as well as BUN.  Will give 1 liter of NS at 100 cc/hr.  Cont to monitor   Avoid nephrotoxins.    Monitor urine output to assure that no obstruction precipitates worsening in GFR.

## 2017-07-03 NOTE — ASSESSMENT & PLAN NOTE
Maintain euvolemia by monitoring I/O's and daily weights.  No weight recorded for today.  Fluid restriction (2 liters/24 hours)  Low Na diet  Cont ARB, BB. Not on a diuretic.

## 2017-07-03 NOTE — ASSESSMENT & PLAN NOTE
Patient is unresponsive today.  Family is stating this is a change from yesterday.  Not sure if related to Tramadol dose or something else.  Dr. Hoffman called to room to examine and recommends sending back to ED.  Cont Plavix and atorvastatin for secondary stroke prevention.   Cont DVT prophylaxis with Lovenox     Future Appointments  Date Time Provider Department Center   7/25/2017 11:00 AM Chayo Barragan MD [facility] STROKE [facility]

## 2017-07-03 NOTE — PT/OT/SLP PROGRESS
Occupational Therapy      Emmett Perez  MRN: [id number]    Patient not seen today secondary to family report that pt demonstrates increased lethargy and has continued complaints of knee pain.  Family requested therapy be held until they have spoken with MD.  Per chart, pt has been sent to [facility] ED for futher evaluation.  Will follow-up.    AIXA De Luna  7/3/2017

## 2017-07-03 NOTE — DISCHARGE SUMMARY
[facility]  Discharge Summary      Patient Name: Emmett Perez  MRN: [id number]  Admission Date: 6/29/2017  Hospital Length of Stay: 4 days  Discharge Date and Time:  07/03/2017 3:26 PM  Attending Physician: Miguelito Hoffman MD   Discharging Provider: Walt Myers NP  Primary Care Provider: Eb Matthews MD    HPI:   Chief Complaint/Reason for Admission: Debility    History of Present Illness:  Patient is a 84 y.o. male who has a past medical history of Alzheimer disease; chronic kidney disease stage 3; Hypertension; Seizure; Stroke; and Type 2 diabetes mellitus with diabetic neuropathy presented with acute left sided weakness. Imaging in the ED was consistent with ischemic stroke in right thalamocapsular territory consistent w/ small vessel infarct. Patient was admitted to neurology and treated with secondary stroke prevention. He did not receive TPA due to being outside therapeutic window. Post stroke patient was mainly lethargic and encephalopathic.    Patient has been working with PT/OT who recommend rehab for further balance/mobility training. Patients insurance denied rehab and thus being sent to SNF. Patient lives in [place] with his wife in a single story home.  Prior to admission, he was independent with mobility and transfer with use of straight cane. Per MD H&P, history mainly taken from wife and chart review. Patient is lethargic and not able to answer questions. Per Dr. Hoffman H&P, he followed minimal commands when awake.     Today, son and wife at bedside.  Son expressed concern in his change in mentation.  States noticed a difference yesterday where patient did not recognize him or his sister.  Son is not sure if his change in mentation is due to his CVA or from the tramadol he had gotten for his complaint of knee pain.      * No surgery found *      Hospital Course:   7/3/17  Patient seen at bedside with wife and son present.  They expressed concern about his change in  mentation since yesterday.  Son explained his dad was not able to recognize him or his sister yesterday but then symptoms resolved.  Today, he is lethargic.  He does respond to pain when grabbing his left knee.  He is unable to lift his arms or open his eyes to verbal and tactile commands.  Son feels he should be readmitted to monitor in an acute setting.  I advised family, I will discuss their issues with Dr. Hoffman.    1045 AM discussed case and family issues with Dr. Hoffman.  He reviewed chart, BUN/Cr is up, WBC went from 6.14 to 11.12.  He is currently afebrile and recent knee x-ray shows a small left suprapatellar synovial complex/effusion.  Dr. Hoffman is recommending UA, urine and blood culture, CXR and IVF.  He said he will go and see patient to examine.    6170 update Dr. Hoffman reports patient is difficult to arise and is a change from his initial examination.  He is recommending to send the patient to the ED for evaluation.  Called report to Dr. Aly at [facility] ED.     Consults         Status Ordering Provider     Inpatient consult to [facility]  Once     Provider:  (Not yet assigned)    Completed GERARDO NELSON          Significant Diagnostic Studies: Labs:   BMP:   Recent Labs  Lab 07/03/17  0530   *      K 4.3      CO2 22*   BUN 32*   CREATININE 1.5*   CALCIUM 10.0   MG 1.7    and CMP   Recent Labs  Lab 07/03/17  0530      K 4.3      CO2 22*   *   BUN 32*   CREATININE 1.5*   CALCIUM 10.0   ANIONGAP 12   ESTGFRAFRICA 48.7*   EGFRNONAA 42.1*       Pending Diagnostic Studies:     Procedure Component Value Units Date/Time    Urinalysis [[id number]] Collected:  07/03/17 1233    Order Status:  Sent Lab Status:  In process Updated:  07/03/17 1234    Specimen:  Urine         Final Active Diagnoses:    Diagnosis Date Noted POA    PRINCIPAL PROBLEM:  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 [I63.331] 06/22/2017 Yes    Effusion of left knee [M25.462]  07/03/2017 Yes    Debility [R53.81] 06/30/2017 Yes    Essential hypertension [I10] 06/22/2017 Yes    Combined systolic and diastolic cardiac dysfunction [I51.89] 06/22/2017 Yes    Type 2 diabetes mellitus with diabetic polyneuropathy, with long-term current use of insulin [E11.42, Z79.4] 11/28/2016 Not Applicable    Dementia without behavioral disturbance [F03.90] 11/28/2016 Yes    Chronic kidney disease, stage III (moderate) [N18.3] 07/30/2013 Yes     Chronic    Monoclonal paraproteinemia [D47.2] 07/30/2013 Yes      Problems Resolved During this Admission:    Diagnosis Date Noted Date Resolved POA      Debility    PT/OT and speech has been consulted.            Chronic combined systolic and diastolic heart failure    Maintain euvolemia by monitoring I/O's and daily weights.  No weight recorded for today.  Fluid restriction (2 liters/24 hours)  Low Na diet  Cont ARB, BB. Not on a diuretic.            Essential hypertension    BP is 140/82 today.  Currently on Norvasc, Hydralazine, Losartan and Toprol XL.  Adjust regimen if no improvement.         Late onset Alzheimer's disease without behavioral disturbance    Cont donepezil and memantine to treat  Delirium precautions.         Type 2 diabetes mellitus with diabetic polyneuropathy, with long-term current use of insulin    BG levels uncontrolled.   PO intake is variable  A1c is 9.8, CBG is elevated.  Will increase Novolog and levemir today.   ADA diet  Recommend admitting team to continue to address in acute setting.        Monoclonal paraproteinemia    Chronic and stable  Follow up with oncology as outpatient.         Acute renal failure superimposed on stage 3 chronic kidney disease    Sr Cr up today as well as BUN.  Will give 1 liter of NS at 100 cc/hr.  Cont to monitor   Avoid nephrotoxins.    Monitor urine output to assure that no obstruction precipitates worsening in GFR.             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  Family is stating this is a change from yesterday.  Not sure if related to Tramadol dose or something else.  Per family Tramadol is new and he is not use to using medications such as this.  Dr. Hoffman called to room to examine and recommends sending back to ED.  Cont Plavix and atorvastatin for secondary stroke prevention.   Cont DVT prophylaxis with Lovenox     Future Appointments  Date Time Provider Department Center   7/25/2017 11:00 AM Chayo Barragan MD [facility] STROKE [address]             Effusion of left knee-resolved as of 7/4/2017    Patient had x-ray of his left knee after admission to SNF.  Per x-ray report he has an effusion.  Dr. Hoffman recommends he see Ortho.   Kindly ask ED team and/or admitting team to consult with Ortho once patient arrives.            Discharged Condition: poor    Disposition: sent to ED    Follow Up:    Patient Instructions:   No discharge procedures on file.  Medications:  Transfer Medications (for Discharge Readmit only):   Current Facility-Administered Medications   Medication Dose Route Frequency Provider Last Rate Last Dose    0.9%  NaCl infusion   Intravenous Continuous Walt Myers  mL/hr at 07/03/17 1220      acetaminophen tablet 650 mg  650 mg Oral Q6H PRN KATHRYN Murdock-C   650 mg at 07/01/17 2321    albuterol-ipratropium 2.5mg-0.5mg/3mL nebulizer solution 3 mL  3 mL Nebulization Q6H PRN Miguelito Hoffman MD        aluminum-magnesium hydroxide-simethicone 200-200-20 mg/5 mL suspension 15 mL  15 mL Oral Q6H PRN Merced Bello PA-C        amlodipine tablet 10 mg  10 mg Oral Daily Merced Bello PA-C   10 mg at 07/03/17 0918    atorvastatin tablet 40 mg  40 mg Oral Daily KATHRYN Murdock-C   40 mg at 07/03/17 0918    clopidogrel tablet 75 mg  75 mg Oral Daily KATHRYN Murdock-C   75 mg at 07/03/17 0918    diclofenac sodium 1 % gel   Topical TID PRN Walt Myers NP        donepezil tablet 10 mg  10 mg  Oral Daily Merced Bello PA-C   10 mg at 07/03/17 0918    enoxaparin injection 40 mg  40 mg Subcutaneous Daily Miguelito Hoffman MD   40 mg at 07/02/17 1721    guaifenesin 100 mg/5 ml syrup 200 mg  200 mg Oral Q6H PRN Walt Myers NP        hydrALAZINE tablet 25 mg  25 mg Oral Q8H PRN Miguelito Hoffman MD        insulin aspart pen 0-5 Units  0-5 Units Subcutaneous QID (AC + HS) PRN MARCIE MurdockC   4 Units at 07/03/17 1214    insulin aspart pen 9 Units  9 Units Subcutaneous TIDWM Walt Myers NP   9 Units at 07/03/17 1214    [START ON 7/4/2017] insulin detemir pen 23 Units  23 Units Subcutaneous Daily Walt Myers NP        losartan tablet 25 mg  25 mg Oral Daily Merced Bello PA-C   25 mg at 07/03/17 0918    memantine tablet 10 mg  10 mg Oral BID MARCIE MurdockC   10 mg at 07/03/17 0918    metoprolol succinate (TOPROL-XL) 24 hr tablet 25 mg  25 mg Oral Daily Merced Bello PA-C   25 mg at 07/03/17 0918    polyethylene glycol packet 17 g  17 g Oral Daily PRN MARCIE MurdockC   17 g at 07/02/17 0818    ramelteon tablet 8 mg  8 mg Oral Nightly PRN Merced Bello PA-C        senna-docusate 8.6-50 mg per tablet 1 tablet  1 tablet Oral BID PRN Miguelito Hoffman MD         Time spent on the discharge of patient: 30 minutes    Walt Myers NP  [facility]

## 2017-07-03 NOTE — HOSPITAL COURSE
7/3/17  Patient seen at bedside with wife and son present.  They expressed concern about his change in mentation since yesterday.  Son explained his dad was not able to recognize him or his sister yesterday but then symptoms resolved.  It appears he was given a dose of Tramadol for his knee pain yesterday.  Today, he is lethargic.  He does respond to pain when grabbing his left knee.  He is unable to lift his arms or open his eyes to verbal and tactile commands.  Son feels he should be readmitted to monitor in an acute setting.  I advised family, I will discuss their issues with Dr. Hoffman.    1045 AM discussed case and family issues with Dr. Hoffman.  He reviewed chart, BUN/Cr is up, WBC went from 6.14 to 11.12.  He is currently afebrile and recent knee x-ray shows a small left suprapatellar synovial complex/effusion.  Dr. Hoffman is recommending UA, urine and blood culture, CXR and IVF.  He said he will go and see patient to examine.    1430 update Dr. Hoffman reports patient is difficult to arise and is a change from his initial examination.  He is recommending to send the patient to the ED for evaluation.  Called report to Dr. Aly at [facility] ED.  He was later admitted.

## 2017-07-03 NOTE — PROGRESS NOTES
Discharge Planning Assessment     Payor: [employer]ED MEDICARE / Plan: [employer] CHOICES 65 / Product Type: Medicare Advantage /     Expected length of stay:  [] 7 days   [] 10 days  [] 14 days   [x] 21 days   [] > 30 days    Communicated expected length of stay with patient/caregiver:  [x] Yes   [] No    Anticipated discharge date:  7/19/2017    Assessment information obtained from:  [] Patient   [x] Caregiver     Arrived from:   [x] Home   [] Assisted Living    [] Nursing Home   [] SNF   [] Rehab  [] LTACH   [] Group Home   [] Foster Care   [] Psych   [] Shelter   [] Homeless   [] Transfer  [] Correctional Facility  [] Name of Facility:      Patient currently lives with:    [] Alone   [x] Spouse   [] Daughter   [] Son   [] Grandparents   []  Parents   [] Siblings   [] Friends   [] Domestic Partner   [] Facility Resident      [] Foster Home    [] Other:       Extended Emergency Contact Information  Primary Emergency Contact: Sirisha Christine  Address: [facility] [address]           [address]  Home Phone: [phone]  Mobile Phone: [phone]  Relation: Spouse     Prior to hospitalization cognitive status:   [x] Alert/Oriented  [] No Deficits [] Risk of Harm to Self/Others   [] Not Oriented to Person   [] Not Oriented to Place   [] Not Oriented to Time   [] Coma/Sedated/Intubated  [] Judgement Impaired    []  Unable to Assess   [] Inappropriate Behavior  [] Infant/Toddler    Prior to hospitalization functional status:   [] Independent   [x] Assistive Equipment   [x] Assistive Person    [] Completely Dependent  [] Infant/Toddler/Child Appropriate   [] Infant/Toddler/Child Delayed    []  Adolescent     Current cognitive status:   [] Alert/Oriented  [] No Deficits [] Risk of Harm to Self/Others   [x] Not Oriented to Person   [x] Not Oriented to Place   [x] Not Oriented to Time   [] Coma/Sedated/Intubated  [] Judgement Impaired    []  Unable to Assess   [] Inappropriate  Behavior  [] Infant/Toddler    Current functional status:     [] Independent   [x] Assistive Equipment   [x] Assistive Person     [] Completely Dependent   [] Infant/Toddler/Child Appropriate   [] Infant/Toddler/Child Delayed     [] Adolescent     Capacity to Care for Self:   Is patient able to return to prior living arrangements after discharge: [x] Yes  [] No     Is patient able to care for self after discharge?   [] Yes   [x] No     [] Pediatric     Does the patient have family/friends to assist after discharge?:  [x] Yes   [] No    [] N/A   Comments:spouse,son.daughter        Patient/caregiver perception of discharge disposition:   [] Home   [x] Home with Family  [x] Home Health   [] SNF   [] Rehab   [] LTAC    []  New Nursing Home Placement  [] Return to Nursing Home    [] Shelter     [] Assisted Living  [] Foster Home   [] Other:      Readmit:   Has patient lui in the hospital in the last 30 days? [] Yes   [x] No     If YES, was patient admitted for the same reason?  [] Yes   [] No       Home Health:   Patient currently receives home health services?:   [] Yes   [x] No     Patient previously received home health services and would like to resume services if necessary   [x] Yes   [] No      If YES, name of home health -  DME:   Patient currently uses DME:   [x] Yes   [] No        List of equipment currently used:     [x] Wheelchair   [] Standard Walker  [] Rolling Walker  [x]  Rollator    [] Oxygen    [] Portable oxygen   [] Nebulizer    [] Apnea Monitor    [] Crutches  [] Hospital Bed   []  Lift Device   [] Scooter [x] Cane     [] Prosthesis   []  BSC   [x] Tub Bench   [] Catheter Supplies    [] Ostomy Supplies   [] Trach Supplies     [] Suction Machine        [] Home Vent    [] Bipap   [] Other:       Medications:    Can the patient afford all prescribed medications?  [x] Yes   [] No     If NO, what medication:       Is the patient taking medications as prescribed?    [x] Yes   [] No    Financial  Concerns:   Does the patient have any financial concerns?   [] Yes   [x] No      If YES, what are the concerns:      Transportation:   Does the patient have transportation to healthcare appointments?   [x] Yes   [] No     If YES, what means of transportation does the patient have?   [] Car   [x] Family/Friend  [] Bicycle   [] Motorcycle   [] Public Transportation [] Ambulance[] Wheelchair van   [] Name of Provider    Dialysis:   Does the patient currently receive dialysis?   [] Yes   [x] No     Eb Matthews MD   [address]  [address]  [place] [facility] [address]  [phone]   [phone]         APS/CPS involved in the case:  [] Yes   [x] No   If YES, name of :     If YES, phone number of :        Discharge Plan A:  [] Home   [x] Home with Family  [x] Home Health   [] SNF   [] Rehab   [] LTAC   []  New Nursing Home Placement  [] Return to Nursing Home    [] Assisted Living    [] Shelter     [] Private Duty Nursing   [] Foster Home    [] Psych    [] Early Steps  [] WIC       [] Home Hospice   [] Inpatient Hospice   [] Other    Discharge Plan B:  [] Home   [] Home with Family  [] Home Health   [] SNF   [] Rehab   [] LTAC  []  New Nursing Home Placement   [] Return to  Nursing Home    [] Assisted Living   [] Shelter  [] Private Duty Nursing   [] Foster Home     [] Psych     [] Early Steps  [] WIC    [] Home Hospice     [] Inpatient Hospice   [] Other     [x] Patient and family in agreement with discharge plan.    Rounded with ART Godoy, pharmacist Cony, and pharmacy student. Patient lethargic. Spouse and son at bedside. Discharge plan is to return home with assist of family. Informed patient/family therapy recommends a 3 week SNF stay and that we (MD, nurse, therapy, SW,KENAN) meet today to review his case and come up with a discharge date. CM and SW will continue to follow for any additional needs. Notified per ART Godoy, patient sent to ER for evaluation.    Dionne Lakhani RN, CM  [place]  [phone]

## 2017-07-03 NOTE — ASSESSMENT & PLAN NOTE
BP is 140/82 today.  Currently on Norvasc, Hydralazine, Losartan and Toprol XL.  Adjust regimen if no improvement.

## 2017-07-03 NOTE — PT/OT/SLP PROGRESS
Physical Therapy      Emmett Perez  MRN: [id number]    Patient not seen today secondary to lethargic and family concerned regarding knee. [facility] [place] [facility] [place]  . Will follow-up when appropriate, pending patient's return to unit..    Debra Hercules, PT

## 2017-07-03 NOTE — PLAN OF CARE
Problem: SLP Goal  Goal: SLP Goal  Speech Language Pathology Goals  Goals expected to be met by 7/7:  1. Pt will tolerate regular consistency diet and thin liquids without s/s of aspiration.  2. Pt will participate in ihxvlu-[id number]-[id number] evaluation to determine need for further intervention.       Outcome: Ongoing (interventions implemented as appropriate)  Attempted to initiated Nothjt-Cefsrclk-Xlpjosxpx Evaluation, but pt somnolent and unable to consistently rouse or sustain arousal for more than 1-2 seconds.  SLP assisted nurse with administering medications crushed and buried in applesauce with mod cues to initiate pharyngeal swallow.  Pt with timely swallow for thin liquids via cup.  Pt should only be fed when awake/alert and able to intentionally accept PO.  STACIE Starr, CCC-SLP  Speech Language Pathologist  [phone]  7/3/2017

## 2017-07-03 NOTE — PLAN OF CARE
Problem: Occupational Therapy Goal  Goal: Occupational Therapy Goal  Goals to be met by 7/6/2017:    1.  Sit eob with CG A for dynamic sitting balance during grooming tasks - not met  2.  CG A to complete grooming tasks in supported sitting - not met  3.  Complete UB dressing with min a - not met  4.  Don shorts/underpants or pants with min a - not met  5.  Toilet self with min a - not met  6.  Toilet or BSC transfer with min a - not met  7.  Supine to sit with minimal (A) - not met  8.  Rolling to (B) directions with Stand by assistance - not met  Outcome: Outcome(s) achieved Date Met: 07/03/17  No goals met.  Hospital discharge.

## 2017-07-04 PROBLEM — M10.362 ACUTE GOUT DUE TO RENAL IMPAIRMENT INVOLVING LEFT KNEE: Status: RESOLVED | Noted: 2017-07-04 | Resolved: 2017-07-04

## 2017-07-04 PROBLEM — M25.462 EFFUSION OF LEFT KNEE: Status: RESOLVED | Noted: 2017-07-03 | Resolved: 2017-07-04

## 2017-07-04 PROBLEM — G81.94 HEMIPARESIS, LEFT: Status: ACTIVE | Noted: 2017-07-04

## 2017-07-04 PROBLEM — G93.40 ACUTE ENCEPHALOPATHY: Status: ACTIVE | Noted: 2017-07-03

## 2017-07-04 PROBLEM — E11.65 TYPE 2 DIABETES MELLITUS WITH HYPERGLYCEMIA, WITH LONG-TERM CURRENT USE OF INSULIN: Status: ACTIVE | Noted: 2017-07-04

## 2017-07-04 PROBLEM — G93.6 CYTOTOXIC CEREBRAL EDEMA: Status: RESOLVED | Noted: 2017-06-22 | Resolved: 2017-07-04

## 2017-07-04 PROBLEM — F03.90 DEMENTIA WITHOUT BEHAVIORAL DISTURBANCE: Status: ACTIVE | Noted: 2017-07-04

## 2017-07-04 PROBLEM — R29.810 FACIAL DROOP: Status: RESOLVED | Noted: 2017-06-22 | Resolved: 2017-07-04

## 2017-07-04 PROBLEM — F03.90 DEMENTIA WITHOUT BEHAVIORAL DISTURBANCE: Status: RESOLVED | Noted: 2017-07-04 | Resolved: 2017-07-04

## 2017-07-04 PROBLEM — I50.42 CHRONIC COMBINED SYSTOLIC AND DIASTOLIC HEART FAILURE: Status: ACTIVE | Noted: 2017-06-22

## 2017-07-04 PROBLEM — Z79.4 TYPE 2 DIABETES MELLITUS WITH HYPERGLYCEMIA, WITH LONG-TERM CURRENT USE OF INSULIN: Status: ACTIVE | Noted: 2017-07-04

## 2017-07-04 PROBLEM — G93.40 ENCEPHALOPATHY: Status: RESOLVED | Noted: 2017-06-25 | Resolved: 2017-07-04

## 2017-07-04 PROBLEM — M10.362 ACUTE GOUT DUE TO RENAL IMPAIRMENT INVOLVING LEFT KNEE: Status: ACTIVE | Noted: 2017-07-04

## 2017-07-04 PROBLEM — G81.94 HEMIPARESIS, LEFT: Status: RESOLVED | Noted: 2017-07-04 | Resolved: 2017-07-04

## 2017-07-04 PROBLEM — G81.94 ACUTE LEFT HEMIPARESIS: Status: RESOLVED | Noted: 2017-06-22 | Resolved: 2017-07-04

## 2017-07-04 PROBLEM — I69.354 HEMIPARESIS AFFECTING LEFT SIDE AS LATE EFFECT OF CEREBROVASCULAR ACCIDENT: Status: ACTIVE | Noted: 2017-06-22

## 2017-07-04 PROBLEM — R19.7 DIARRHEA: Status: RESOLVED | Noted: 2017-06-29 | Resolved: 2017-07-04

## 2017-07-04 LAB
ANION GAP SERPL CALC-SCNC: 11 MMOL/L
APPEARANCE FLD: NORMAL
BODY FLD TYPE: NORMAL
BUN SERPL-MCNC: 41 MG/DL
CALCIUM SERPL-MCNC: 10 MG/DL
CHLORIDE SERPL-SCNC: 110 MMOL/L
CO2 SERPL-SCNC: 23 MMOL/L
COLOR FLD: NORMAL
CREAT SERPL-MCNC: 1.4 MG/DL
EST. GFR  (AFRICAN AMERICAN): 53 ML/MIN/1.73 M^2
EST. GFR  (NON AFRICAN AMERICAN): 45.8 ML/MIN/1.73 M^2
GLUCOSE SERPL-MCNC: 248 MG/DL
LYMPHOCYTES NFR FLD MANUAL: 1 %
MONOS+MACROS NFR FLD MANUAL: 6 %
NEUTROPHILS NFR FLD MANUAL: 93 %
POCT GLUCOSE: 251 MG/DL (ref 70–110)
POCT GLUCOSE: 288 MG/DL (ref 70–110)
POCT GLUCOSE: 308 MG/DL (ref 70–110)
POCT GLUCOSE: 321 MG/DL (ref 70–110)
POTASSIUM SERPL-SCNC: 4.3 MMOL/L
SODIUM SERPL-SCNC: 144 MMOL/L
WBC # FLD: 9400 /CU MM

## 2017-07-04 PROCEDURE — 36415 COLL VENOUS BLD VENIPUNCTURE: CPT

## 2017-07-04 PROCEDURE — 99225 PR SUBSEQUENT OBSERVATION CARE,LEVEL II: CPT | Mod: ,,, | Performed by: INTERNAL MEDICINE

## 2017-07-04 PROCEDURE — 92610 EVALUATE SWALLOWING FUNCTION: CPT

## 2017-07-04 PROCEDURE — 11000001 HC ACUTE MED/SURG PRIVATE ROOM

## 2017-07-04 PROCEDURE — 99220 PR INITIAL OBSERVATION CARE,LEVL III: CPT | Mod: ,,, | Performed by: HOSPITALIST

## 2017-07-04 PROCEDURE — 80048 BASIC METABOLIC PNL TOTAL CA: CPT

## 2017-07-04 PROCEDURE — 25000003 PHARM REV CODE 250: Performed by: HOSPITALIST

## 2017-07-04 PROCEDURE — G8996 SWALLOW CURRENT STATUS: HCPCS | Mod: CJ

## 2017-07-04 PROCEDURE — G0378 HOSPITAL OBSERVATION PER HR: HCPCS

## 2017-07-04 PROCEDURE — G8997 SWALLOW GOAL STATUS: HCPCS | Mod: CI

## 2017-07-04 PROCEDURE — 63600175 PHARM REV CODE 636 W HCPCS: Performed by: HOSPITALIST

## 2017-07-04 PROCEDURE — 25000003 PHARM REV CODE 250: Performed by: INTERNAL MEDICINE

## 2017-07-04 RX ORDER — ACETAMINOPHEN 325 MG/1
650 TABLET ORAL EVERY 6 HOURS PRN
Status: DISCONTINUED | OUTPATIENT
Start: 2017-07-04 | End: 2017-07-09 | Stop reason: HOSPADM

## 2017-07-04 RX ORDER — INSULIN ASPART 100 [IU]/ML
12 INJECTION, SOLUTION INTRAVENOUS; SUBCUTANEOUS
Status: DISCONTINUED | OUTPATIENT
Start: 2017-07-04 | End: 2017-07-04

## 2017-07-04 RX ORDER — LISINOPRIL 5 MG/1
5 TABLET ORAL DAILY
Status: DISCONTINUED | OUTPATIENT
Start: 2017-07-04 | End: 2017-07-05

## 2017-07-04 RX ORDER — NAPROXEN SODIUM 220 MG/1
81 TABLET, FILM COATED ORAL DAILY
Status: DISCONTINUED | OUTPATIENT
Start: 2017-07-04 | End: 2017-07-09 | Stop reason: HOSPADM

## 2017-07-04 RX ORDER — METOPROLOL SUCCINATE 25 MG/1
25 TABLET, EXTENDED RELEASE ORAL DAILY
Status: DISCONTINUED | OUTPATIENT
Start: 2017-07-04 | End: 2017-07-09 | Stop reason: HOSPADM

## 2017-07-04 RX ORDER — IBUPROFEN 200 MG
16 TABLET ORAL
Status: DISCONTINUED | OUTPATIENT
Start: 2017-07-04 | End: 2017-07-09 | Stop reason: HOSPADM

## 2017-07-04 RX ORDER — GLUCAGON 1 MG
1 KIT INJECTION
Status: DISCONTINUED | OUTPATIENT
Start: 2017-07-04 | End: 2017-07-09 | Stop reason: HOSPADM

## 2017-07-04 RX ORDER — INSULIN ASPART 100 [IU]/ML
14 INJECTION, SOLUTION INTRAVENOUS; SUBCUTANEOUS
Status: DISCONTINUED | OUTPATIENT
Start: 2017-07-04 | End: 2017-07-05

## 2017-07-04 RX ORDER — INSULIN ASPART 100 [IU]/ML
0-5 INJECTION, SOLUTION INTRAVENOUS; SUBCUTANEOUS
Status: DISCONTINUED | OUTPATIENT
Start: 2017-07-04 | End: 2017-07-09 | Stop reason: HOSPADM

## 2017-07-04 RX ORDER — ENOXAPARIN SODIUM 100 MG/ML
40 INJECTION SUBCUTANEOUS EVERY 24 HOURS
Status: DISCONTINUED | OUTPATIENT
Start: 2017-07-04 | End: 2017-07-09 | Stop reason: HOSPADM

## 2017-07-04 RX ORDER — POLYETHYLENE GLYCOL 3350 17 G/17G
17 POWDER, FOR SOLUTION ORAL 2 TIMES DAILY PRN
Status: DISCONTINUED | OUTPATIENT
Start: 2017-07-04 | End: 2017-07-09 | Stop reason: HOSPADM

## 2017-07-04 RX ORDER — AMLODIPINE BESYLATE 10 MG/1
10 TABLET ORAL DAILY
Status: DISCONTINUED | OUTPATIENT
Start: 2017-07-04 | End: 2017-07-09 | Stop reason: HOSPADM

## 2017-07-04 RX ORDER — ATORVASTATIN CALCIUM 20 MG/1
40 TABLET, FILM COATED ORAL DAILY
Status: DISCONTINUED | OUTPATIENT
Start: 2017-07-04 | End: 2017-07-09 | Stop reason: HOSPADM

## 2017-07-04 RX ORDER — DONEPEZIL HYDROCHLORIDE 10 MG/1
10 TABLET, FILM COATED ORAL DAILY
Status: DISCONTINUED | OUTPATIENT
Start: 2017-07-04 | End: 2017-07-09 | Stop reason: HOSPADM

## 2017-07-04 RX ORDER — PREDNISONE 10 MG/1
40 TABLET ORAL DAILY
Status: COMPLETED | OUTPATIENT
Start: 2017-07-04 | End: 2017-07-08

## 2017-07-04 RX ORDER — RAMELTEON 8 MG/1
8 TABLET ORAL NIGHTLY PRN
Status: DISCONTINUED | OUTPATIENT
Start: 2017-07-04 | End: 2017-07-09 | Stop reason: HOSPADM

## 2017-07-04 RX ORDER — IBUPROFEN 200 MG
24 TABLET ORAL
Status: DISCONTINUED | OUTPATIENT
Start: 2017-07-04 | End: 2017-07-09 | Stop reason: HOSPADM

## 2017-07-04 RX ORDER — SODIUM CHLORIDE 9 MG/ML
INJECTION, SOLUTION INTRAVENOUS CONTINUOUS
Status: DISCONTINUED | OUTPATIENT
Start: 2017-07-04 | End: 2017-07-06

## 2017-07-04 RX ORDER — ONDANSETRON 2 MG/ML
4 INJECTION INTRAMUSCULAR; INTRAVENOUS EVERY 12 HOURS PRN
Status: DISCONTINUED | OUTPATIENT
Start: 2017-07-04 | End: 2017-07-09 | Stop reason: HOSPADM

## 2017-07-04 RX ORDER — MEMANTINE HYDROCHLORIDE 10 MG/1
10 TABLET ORAL 2 TIMES DAILY
Status: DISCONTINUED | OUTPATIENT
Start: 2017-07-04 | End: 2017-07-09 | Stop reason: HOSPADM

## 2017-07-04 RX ADMIN — INSULIN DETEMIR 64 UNITS: 100 INJECTION, SOLUTION SUBCUTANEOUS at 08:07

## 2017-07-04 RX ADMIN — SODIUM CHLORIDE: 0.9 INJECTION, SOLUTION INTRAVENOUS at 07:07

## 2017-07-04 RX ADMIN — MEMANTINE HYDROCHLORIDE 10 MG: 10 TABLET ORAL at 01:07

## 2017-07-04 RX ADMIN — ATORVASTATIN CALCIUM 40 MG: 20 TABLET, FILM COATED ORAL at 08:07

## 2017-07-04 RX ADMIN — AMLODIPINE BESYLATE 10 MG: 10 TABLET ORAL at 08:07

## 2017-07-04 RX ADMIN — METOPROLOL SUCCINATE 25 MG: 25 TABLET, EXTENDED RELEASE ORAL at 08:07

## 2017-07-04 RX ADMIN — ASPIRIN 81 MG CHEWABLE TABLET 81 MG: 81 TABLET CHEWABLE at 08:07

## 2017-07-04 RX ADMIN — INSULIN ASPART 12 UNITS: 100 INJECTION, SOLUTION INTRAVENOUS; SUBCUTANEOUS at 08:07

## 2017-07-04 RX ADMIN — INSULIN ASPART 3 UNITS: 100 INJECTION, SOLUTION INTRAVENOUS; SUBCUTANEOUS at 08:07

## 2017-07-04 RX ADMIN — ENOXAPARIN SODIUM 40 MG: 100 INJECTION SUBCUTANEOUS at 04:07

## 2017-07-04 RX ADMIN — ACETAMINOPHEN 650 MG: 325 TABLET ORAL at 02:07

## 2017-07-04 RX ADMIN — INSULIN ASPART 4 UNITS: 100 INJECTION, SOLUTION INTRAVENOUS; SUBCUTANEOUS at 04:07

## 2017-07-04 RX ADMIN — INSULIN ASPART 3 UNITS: 100 INJECTION, SOLUTION INTRAVENOUS; SUBCUTANEOUS at 12:07

## 2017-07-04 RX ADMIN — INSULIN ASPART 12 UNITS: 100 INJECTION, SOLUTION INTRAVENOUS; SUBCUTANEOUS at 12:07

## 2017-07-04 RX ADMIN — COLCHICINE 0.3 MG: 0.6 TABLET, FILM COATED ORAL at 08:07

## 2017-07-04 RX ADMIN — LISINOPRIL 5 MG: 5 TABLET ORAL at 08:07

## 2017-07-04 RX ADMIN — INSULIN ASPART 14 UNITS: 100 INJECTION, SOLUTION INTRAVENOUS; SUBCUTANEOUS at 04:07

## 2017-07-04 RX ADMIN — PREDNISONE 40 MG: 20 TABLET ORAL at 08:07

## 2017-07-04 RX ADMIN — MEMANTINE HYDROCHLORIDE 10 MG: 10 TABLET ORAL at 08:07

## 2017-07-04 RX ADMIN — DONEPEZIL HYDROCHLORIDE 10 MG: 10 TABLET, FILM COATED ORAL at 08:07

## 2017-07-04 RX ADMIN — ACETAMINOPHEN 650 MG: 325 TABLET ORAL at 09:07

## 2017-07-04 RX ADMIN — MEMANTINE HYDROCHLORIDE 10 MG: 10 TABLET ORAL at 09:07

## 2017-07-04 RX ADMIN — INSULIN ASPART 2 UNITS: 100 INJECTION, SOLUTION INTRAVENOUS; SUBCUTANEOUS at 09:07

## 2017-07-04 NOTE — PROGRESS NOTES
[facility] Hospital Medicine  Progress Note    Patient Name: Emmett Perez  MRN: [id number]  Patient Class: OP- Observation   Admission Date: 7/3/2017  Length of Stay: 1 days  Attending Physician: Unique De Jesus MD  Primary Care Provider: Eb Matthews MD    [facility] Medicine Team: [facility]OSP MED K Unique De Jesus MD    Subjective:     Principal Problem:Acute encephalopathy    HPI:  84 y.o. 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 History obtained from family at bedside due to patient's somnolence. The night before presentation he complained of left knee pain and was given a dose of Tramadol, which he had never had before.  Shortly after getting that he became confused and somnolent, not recognizing family.  He slept all through the day and could only be roused to drink some fluids but would not eat or speak, nor participate in therapy. In the ED he was noted to have a warm, swollen left knee upon when arthrocentesis was performed and results consistent with acute gout. He has no prior known history of gout, does not regularly eat shellfish, nor does he drink beer.      Hospital Course:  Patient placed on observation for acute encephalopathy felt due to Tramadol. Patient did have CT scan of head done on 7/3 that showed evolving right thalamic infarction but no detrimental interval change from prior examination. Patient was started on Prednisone 40 mg po daily and Colchicine 0.3 mg po daily for treatment of acute gout to left knee. Patient was placed on just Tylenol alone for pain. Patient remained  somnolent on 7/4 and plan is for MRI imaging of brain if no clinical improvement by 7/5. Patient's labs reviewed and no evidence to suggest infection as cause. Patient with no evidence to suggest seizure as cause. Electrolytes and renal function stable so metabolic encephalopathy not felt to be cause of somnolence.     Interval History: Patient's family at bedside and report patient does appear more alert today but still difficult to arouse according to family. Patient not responding verbally to any questioning.     Review of Systems   Unable to perform ROS: Mental status change     Objective:     Vital Signs (Most Recent):  Temp: 97.4 °F (36.3 °C) (07/04/17 1619)  Pulse: 99 (07/04/17 1619)  Resp: 16 (07/04/17 1619)  BP: 132/68 (07/04/17 1619)  SpO2: 95 % (07/04/17 1619) Vital Signs (24h Range):  Temp:  [97.4 °F (36.3 °C)-98.7 °F (37.1 °C)] 97.4 °F (36.3 °C)  Pulse:  [] 99  Resp:  [15-18] 16  SpO2:  [94 %-99 %] 95 %  BP: (130-170)/(68-93) 132/68     Weight: 85.3 kg (188 lb)  Body mass index is 27.76 kg/m².    Intake/Output Summary (Last 24 hours) at 07/04/17 1818  Last data filed at 07/04/17 0643   Gross per 24 hour   Intake              120 ml   Output                0 ml   Net              120 ml      Physical Exam   Constitutional: He appears well-developed and well-nourished. He appears lethargic. No distress.   HENT:   Mouth/Throat: Oropharynx is clear and moist.   Eyes: Conjunctivae are normal.   Neck: No JVD present.   Cardiovascular: Normal rate, regular rhythm and normal heart sounds.  Exam reveals no gallop and no friction rub.    No murmur heard.  Pulmonary/Chest: Effort normal and breath sounds normal. He has no wheezes. He has no rales.   Abdominal: Soft. Bowel sounds are normal. He exhibits no distension. There is no tenderness.   Musculoskeletal: He exhibits no edema.   Pain on palpation and ROM to left knee; Small left knee joint effusion noted   Neurological: He appears lethargic. He is  disoriented.   Skin: No rash noted. No erythema.       Significant Labs:   A1C:   Recent Labs  Lab 06/22/17  0017   HGBA1C 9.8*     Blood Culture:   Recent Labs  Lab 07/03/17  1130 07/03/17  1647   LABBLOO No Growth to date  No Growth to date No Growth to date  No Growth to date  No Growth to date  No Growth to date     CBC:   Recent Labs  Lab 07/03/17  0530 07/03/17  1646 07/03/17  1902 07/03/17  1954   WBC 11.12 7.73  --  11.93   HGB 14.0 9.4*  --  14.3   HCT 43.3 28.2* 45 41.6    161  --  249     CMP:   Recent Labs  Lab 07/03/17  1812 07/03/17  2027 07/04/17  0410   * 142 144   K 3.2* 5.2* 4.3   * 108 110   CO2 18* 24 23   * 218* 248*   BUN 28* 37* 41*   CREATININE 0.9 1.5* 1.4   CALCIUM 6.4* 9.8 10.0   PROT 4.9* 8.1  --    ALBUMIN 2.0* 3.1*  --    BILITOT 0.8 1.2*  --    ALKPHOS 91 142*  --    AST 15 28  --    ALT 14 24  --    ANIONGAP 8 10 11   EGFRNONAA >60.0 42.1* 45.8*     POCT Glucose:   Recent Labs  Lab 07/04/17  0843 07/04/17  1206 07/04/17  1649   POCTGLUCOSE 288* 251* 308*     Urine Studies:   Recent Labs  Lab 07/03/17  1233 07/03/17  1708   COLORU Yellow Yellow   APPEARANCEUA Clear Clear   PHUR 5.0 5.0   SPECGRAV 1.015 1.010   PROTEINUA Negative Negative   GLUCUA 3+* 3+*   KETONESU Trace* Negative   BILIRUBINUA Negative Negative   OCCULTUA 2+* 2+*   NITRITE Negative Negative   UROBILINOGEN Negative Negative   LEUKOCYTESUR Negative Negative   RBCUA 1 2   WBCUA 1 1   BACTERIA Rare Rare   SQUAMEPITHEL 0  --    HYALINECASTS 1 4*     Results for orders placed or performed during the hospital encounter of 07/03/17   WBC & Diff,Body Fluid Joint Fluid, Left Knee   Result Value Ref Range    Body Fluid Type Joint Fluid, Left Knee     Fluid Appearance Cloudy     Fluid Color Kimberlyn     WBC, Body Fluid 9400 /cu mm    Segs, Fluid 93 %    Lymphs, Fluid 1 %    Monocytes/Macrophages, Fluid 6 %       Significant Imaging: I have reviewed all pertinent imaging results/findings within the past  24 hours.    Assessment/Plan:      * Acute encephalopathy    Patient with continued somnolence and felt likely due to delayed clearance of Tramadol in setting of advanced age and CKD.  Avoid use of tramadol in future.  Monitor with neuro checks; anticipate clearing of mental status in next 24 hours but if no improvement likely will need further neuro imaging with MRI of brain. No evidence to suggest metabolic cause as BUN only minimally elevated and sodium and calcium normal. Patient with no fever, tachycardia or leukocytosis to suggest infectious cause.           Acute gout of left knee    Mildly improved. Likely due to reduced uric acid clearance in CKD. No dietary risk factors. Given advanced age and insulin-dependent diabetes will not use Kenalog injection nor would NSAIDS be an appropriate choice given CKD stage 3; Will instead treat gout flare with short course of Prednisone 40mg daily x 5 days, which can be tapered out longer if needed. Will also continue Colchicine 0.3mg daily x 5 days, then daily prn flare thereafter.  Use colchicine sparingly in setting of CKD. Start Allopurinol 100 mg po daily in 1-2 weeks once gout flare resolved for maintenance therapy to prevent future attacks and reduce risk of recurrence.            Type 2 diabetes mellitus with hyperglycemia, with long-term current use of insulin    Type 2 diabetes mellitus with diabetic polyneuropathy, with long-term current use of insulin  Suboptimal control in the hospital and likely related to poor cntrol prior to this hospital stay and addition of Prednisone to treat acute gout. HgA1C 9.8% and not at goal on 6/22. Plan is to increase Levemir from 64 to 68 units at bedtime and increase Novolog from 12 to 14 units with meals. Monitor POCT glucose 4 times a day with each meal and at bedtime and cover with Novolog low dose sliding scale insulin. 2000 calorie diabetic diet. Target pre-meal glucose goal is <140 with all random glucoses <180 in  non-critically ill patient.         Type 2 diabetes mellitus with stage 3 chronic kidney disease, with long-term current use of insulin    Patient is at baseline renal function and controlled at present. Will avoid any nephrotoxic agents such as NSAIDS, IV contrast or aminoglycosides unless necessary while hospitalized and will renally adjust medications based on patient's creatinine clearance. Strict I+Os. Monitor daily BMP to monitor renal function.             Late onset Alzheimer's disease without behavioral disturbance    Controlled. Patient likely more susceptible to effects of sedating medications.  Avoid tramadol in future.  Continue Namenda and Aricept to treat dementia.  Delirium precautions.          Essential hypertension    Patient's blood pressure is controlled here in the hospital over past 24 hours. Goal for blood pressure is SBP < 140 and DBP < 90 as patient > or = 60 years of age and patient has advanced chronic kidney disease based on JNC 8 guidelines. Continue current medications of Amlodipine, Lisinopril and Toprol XL to treat. Plan to continue to monitor patient's blood pressure routinely while patient is hospitalized.           Hemiparesis affecting left side as late effect of cerebrovascular accident    Plan to resume PT/OT in the am. Speech therapy evaluated patient and recommending pureed diet with thin liquids.          Chronic combined systolic and diastolic heart failure    Controlled. Patient without evidence of decompensation presently.  Plan to continue Metoprolol XL and Lisinopril to treat.               VTE Risk Mitigation         Ordered     enoxaparin injection 40 mg  Daily     Route:  Subcutaneous        07/04/17 0005     Medium Risk of VTE  Once      07/04/17 0005          Unique De Jesus MD  Department of Hospital Medicine   [facility]

## 2017-07-04 NOTE — HPI
84 YOM with hx of:CVA, HTN, DM II, Alzheimer disease, and CKD  that presents to the ED via EMS with a complaint of AMS of 2 days duration.     Per wife, pt had a CVA and was at [facility] for rehab. They state that while he was in rehab yesterday, he complained of left knee pain and was given one dose of Tramadol. Relatives state yesterday around 1 PM he was unable to recognize anyone and seemed as if he was hallucinating. They state he was fine afterwards. However, at night he was again unable to recognize anyone and was unresponsive. Relatives express concern because they state at baseline patient is interactive, responsive and eats well. However, they note a significant change from yesterday morning as patient has not been responsive or eating well.    Knee pain began quickly and is severe.  Never had pain like this before.  Pain with movement and walking.  Worsening.  No h/o septic joint, gout, inflammatory arthritis.

## 2017-07-04 NOTE — PT/OT/SLP EVAL
Speech Language Pathology  Bedside Swallow Study  Evaluation    Emmett Perez   MRN: [id number]   505/505 A    Admitting Diagnosis: Somnolence    Diet recommendations: Solid Diet Level: Puree  Liquid Diet Level: Thin   FEED ONLY WHEN AWAKE/ALERT AND ABLE TO INTENTIONALLY ACCEPT PO; NO STRAWS!!!! HOB to 90 degrees, Small bites/sips, Alternating bites/sips, 1 bite/sip at a time, Check for pocketing/oral residue, Meds whole buried in puree (MAY NEED VERBAL CUES TO SWALLOW), Eliminate distractions and Assistance with meals    SLP Treatment Date: 07/04/17  Speech Start Time: 1058     Speech Stop Time: 1115     Speech Total (min): 17 min       TREATMENT BILLABLE MINUTES:  Eval Swallow and Oral Function 17    Diagnosis: Somnolence    Head CT: 1.  Evolving right thalamic infarction.  No detrimental interval change from prior examination.  2.  Chronic microvascular ischemic change.    CXR: No definitive consolidations. Left lung base atelectasis.    H&P: 84M brought to ED from [facility] for somnolence and confusion.  History obtained from family at bedside due to patient's somnolence.  The night before presentation he complained of left knee pain and was given a dose of tramadol, which he had never had before.  Shortly after getting that he became confused and somnolent, not recognizing family.  He slept all through the day and could only be roused to drink some fluids but would not eat or speak, nor participate in therapy.  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  In the ED he was noted to have a warm, swollen left knee upon when arthrocentesis was performed.  He has no prior known history of gout, does not regularly eat shellfish, nor does he drink beer.      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 : Yes  Keep patient NPO?: No   General Precautions: Standard, fall, aspiration, pureed diet, seizure          Prior diet: MBSS completed on 6/27/2017. Aspiration noted with straw sips of thin liquids and regular diet and thin liquids (via cup only) were recommended.     Subjective:  Pt lethargic. Family present in room. Communicated with RN prior to entry who reported pt accepted meds crushed in puree with min difficulty (pt swallowed pudding and spit out some chunks of medication).   Patient goals: none stated.    Pain/Comfort  Pain Rating 1: 0/10    Objective:      Oral Musculature Evaluation  Oral Musculature: unable to assess due to poor participation/comprehension  Dentition: present and adequate  Mucosal Quality: good  Volitional Cough: did not follow command to cough  Volitional Swallow: did not follow command to volitionally swallow  Voice Prior to PO Intake: clear     Bedside Swallow Eval:  Consistencies Assessed: Thin liquids sips of water via cup x5, Puree tsp bites of pudding x3 and Solids bite of jacqueline cracker x1  Oral Phase: excess chewing, impaired rotational chew and spitting out of  food/liquid  Pharyngeal Phase: no overt clinical  signs/symptoms of aspiration and no overt clinical signs/symptoms of pharyngeal dysphagia    Bedside swallow study completed. Pt with a h/o dysphagia with thin liquids via straw. Pt chewed up solid trial and spit it out. No overt s/s of aspiration with thin liquid via cup and tsp bites of puree. Recommend: Pureed diet, thin liquids, crushed po medications, NO STRAWS, feed only when awake/alert, HOB elevated to 90 degrees, small bites and sips 1 at a time, 1:1 assistance with meals. ST will continue to follow.     Assessment:  Emmett Perez is a 84 y.o. male with a medical diagnosis of Somnolence and presents with oral and pharyngeal dysphagia. ST will continue to follow.           Discharge recommendations: Discharge Facility/Level Of Care Needs:  (return to prior level of care)     Goals:    SLP Goals        Problem: SLP Goal    Goal Priority Disciplines Outcome   SLP Goal     SLP Ongoing (interventions implemented as appropriate)   Description:  Speech Therapy Short Term Goals  Goals expected to be met by  7/11  1. Pt will tolerate puree diet and thin liquids with no overt s/s of aspiration noted.   2. Pt will tolerate dental soft trials x10 for possible diet upgrade with adequate oral clearance and no overt s/s of aspiration.                        Plan:   Patient to be seen Therapy Frequency: 5 x/week   Plan of Care expires: 08/02/17  Plan of Care reviewed with: spouse, son, patient  SLP Follow-up?: Yes         SLP G-Codes  Functional Assessment Tool Used: noms  Score: 5  Functional Limitations: Swallowing  Swallow Current Status (): ALEX  Swallow Goal Status (): STACIE Johnson, CCC-SLP  07/04/2017

## 2017-07-04 NOTE — ASSESSMENT & PLAN NOTE
Controlled. Patient without evidence of decompensation presently.  Plan to continue Metoprolol XL and Lisinopril to treat.

## 2017-07-04 NOTE — ED NOTES
Pt presented to the ED via EMS. Pt was admitted to the SNF post stroke. Swelling noted to left knee. Warm to touch. Pt lethargic. Pt arouses with painful stimuli. Family at the bedside.

## 2017-07-04 NOTE — ASSESSMENT & PLAN NOTE
Patient's blood pressure is controlled here in the hospital over past 24 hours. Goal for blood pressure is SBP < 140 and DBP < 90 as patient > or = 60 years of age and patient has advanced chronic kidney disease based on JNC 8 guidelines. Continue current medications of Amlodipine, Lisinopril and Toprol XL to treat. Plan to continue to monitor patient's blood pressure routinely while patient is hospitalized.

## 2017-07-04 NOTE — ASSESSMENT & PLAN NOTE
Mildly improved. Likely due to reduced uric acid clearance in CKD. No dietary risk factors. Given advanced age and insulin-dependent diabetes will not use Kenalog injection nor would NSAIDS be an appropriate choice given CKD stage 3; Will instead treat gout flare with short course of Prednisone 40mg daily x 5 days, which can be tapered out longer if needed. Will also continue Colchicine 0.3mg daily x 5 days, then daily prn flare thereafter.  Use colchicine sparingly in setting of CKD. Start Allopurinol 100 mg po daily in 1-2 weeks once gout flare resolved for maintenance therapy to prevent future attacks and reduce risk of recurrence.

## 2017-07-04 NOTE — SUBJECTIVE & OBJECTIVE
Interval History: Patient's family at bedside and report patient does appear more alert today but still difficult to arouse according to family. Patient not responding verbally to any questioning.     Review of Systems   Unable to perform ROS: Mental status change     Objective:     Vital Signs (Most Recent):  Temp: 97.4 °F (36.3 °C) (07/04/17 1619)  Pulse: 99 (07/04/17 1619)  Resp: 16 (07/04/17 1619)  BP: 132/68 (07/04/17 1619)  SpO2: 95 % (07/04/17 1619) Vital Signs (24h Range):  Temp:  [97.4 °F (36.3 °C)-98.7 °F (37.1 °C)] 97.4 °F (36.3 °C)  Pulse:  [] 99  Resp:  [15-18] 16  SpO2:  [94 %-99 %] 95 %  BP: (130-170)/(68-93) 132/68     Weight: 85.3 kg (188 lb)  Body mass index is 27.76 kg/m².    Intake/Output Summary (Last 24 hours) at 07/04/17 1818  Last data filed at 07/04/17 0643   Gross per 24 hour   Intake              120 ml   Output                0 ml   Net              120 ml      Physical Exam   Constitutional: He appears well-developed and well-nourished. He appears lethargic. No distress.   HENT:   Mouth/Throat: Oropharynx is clear and moist.   Eyes: Conjunctivae are normal.   Neck: No JVD present.   Cardiovascular: Normal rate, regular rhythm and normal heart sounds.  Exam reveals no gallop and no friction rub.    No murmur heard.  Pulmonary/Chest: Effort normal and breath sounds normal. He has no wheezes. He has no rales.   Abdominal: Soft. Bowel sounds are normal. He exhibits no distension. There is no tenderness.   Musculoskeletal: He exhibits no edema.   Pain on palpation and ROM to left knee; Small left knee joint effusion noted   Neurological: He appears lethargic. He is disoriented.   Skin: No rash noted. No erythema.       Significant Labs:   A1C:   Recent Labs  Lab 06/22/17  0017   HGBA1C 9.8*     Blood Culture:   Recent Labs  Lab 07/03/17  1130 07/03/17  1647   LABBLOO No Growth to date  No Growth to date No Growth to date  No Growth to date  No Growth to date  No Growth to date      CBC:   Recent Labs  Lab 07/03/17  0530 07/03/17  1646 07/03/17  1902 07/03/17  1954   WBC 11.12 7.73  --  11.93   HGB 14.0 9.4*  --  14.3   HCT 43.3 28.2* 45 41.6    161  --  249     CMP:   Recent Labs  Lab 07/03/17  1812 07/03/17  2027 07/04/17  0410   * 142 144   K 3.2* 5.2* 4.3   * 108 110   CO2 18* 24 23   * 218* 248*   BUN 28* 37* 41*   CREATININE 0.9 1.5* 1.4   CALCIUM 6.4* 9.8 10.0   PROT 4.9* 8.1  --    ALBUMIN 2.0* 3.1*  --    BILITOT 0.8 1.2*  --    ALKPHOS 91 142*  --    AST 15 28  --    ALT 14 24  --    ANIONGAP 8 10 11   EGFRNONAA >60.0 42.1* 45.8*     POCT Glucose:   Recent Labs  Lab 07/04/17  0843 07/04/17  1206 07/04/17  1649   POCTGLUCOSE 288* 251* 308*     Urine Studies:   Recent Labs  Lab 07/03/17  1233 07/03/17  1708   COLORU Yellow Yellow   APPEARANCEUA Clear Clear   PHUR 5.0 5.0   SPECGRAV 1.015 1.010   PROTEINUA Negative Negative   GLUCUA 3+* 3+*   KETONESU Trace* Negative   BILIRUBINUA Negative Negative   OCCULTUA 2+* 2+*   NITRITE Negative Negative   UROBILINOGEN Negative Negative   LEUKOCYTESUR Negative Negative   RBCUA 1 2   WBCUA 1 1   BACTERIA Rare Rare   SQUAMEPITHEL 0  --    HYALINECASTS 1 4*     Results for orders placed or performed during the hospital encounter of 07/03/17   WBC & Diff,Body Fluid Joint Fluid, Left Knee   Result Value Ref Range    Body Fluid Type Joint Fluid, Left Knee     Fluid Appearance Cloudy     Fluid Color Kimberlyn     WBC, Body Fluid 9400 /cu mm    Segs, Fluid 93 %    Lymphs, Fluid 1 %    Monocytes/Macrophages, Fluid 6 %       Significant Imaging: I have reviewed all pertinent imaging results/findings within the past 24 hours.

## 2017-07-04 NOTE — ASSESSMENT & PLAN NOTE
Controlled. Patient likely more susceptible to effects of sedating medications.  Avoid tramadol in future.  Continue Namenda and Aricept to treat dementia.  Delirium precautions.

## 2017-07-04 NOTE — ASSESSMENT & PLAN NOTE
Likely more susceptible to effects of sedating medications.  Avoid tramadol in future.  Continue Namenda and Aricept.  [place] delirium precautions.

## 2017-07-04 NOTE — SUBJECTIVE & OBJECTIVE
Past Medical History:   Diagnosis Date    Alzheimer disease     CKD (chronic kidney disease) stage 3, GFR 30-59 ml/min     Hypertension     Seizure     Stroke     Type 2 diabetes mellitus with diabetic neuropathy        Past Surgical History:   Procedure Laterality Date    CERVICAL SPINE SURGERY  2007    CORONARY ANGIOPLASTY WITH STENT PLACEMENT      thyroid nodule  1997       Review of patient's allergies indicates:  No Known Allergies    No current facility-administered medications for this encounter.      Facility-Administered Medications Ordered in Other Encounters   Medication    [MAR Hold - Suspended Admission] 0.9%  NaCl infusion    [MAR Hold - Suspended Admission] acetaminophen tablet 650 mg    [MAR Hold - Suspended Admission] albuterol-ipratropium 2.5mg-0.5mg/3mL nebulizer solution 3 mL    [MAR Hold - Suspended Admission] aluminum-magnesium hydroxide-simethicone 200-200-20 mg/5 mL suspension 15 mL    [MAR Hold - Suspended Admission] amlodipine tablet 10 mg    [MAR Hold - Suspended Admission] atorvastatin tablet 40 mg    [MAR Hold - Suspended Admission] clopidogrel tablet 75 mg    [MAR Hold - Suspended Admission] diclofenac sodium 1 % gel    [MAR Hold - Suspended Admission] donepezil tablet 10 mg    [MAR Hold - Suspended Admission] enoxaparin injection 40 mg    [MAR Hold - Suspended Admission] guaifenesin 100 mg/5 ml syrup 200 mg    [MAR Hold - Suspended Admission] hydrALAZINE tablet 25 mg    [MAR Hold - Suspended Admission] insulin aspart pen 0-5 Units    [MAR Hold - Suspended Admission] insulin aspart pen 9 Units    [MAR Hold - Suspended Admission] insulin detemir pen 23 Units    [MAR Hold - Suspended Admission] losartan tablet 25 mg    [MAR Hold - Suspended Admission] memantine tablet 10 mg    [MAR Hold - Suspended Admission] metoprolol succinate (TOPROL-XL) 24 hr tablet 25 mg    [MAR Hold - Suspended Admission] polyethylene glycol packet 17 g    [MAR Hold - Suspended  "Admission] ramelteon tablet 8 mg    [MAR Hold - Suspended Admission] senna-docusate 8.6-50 mg per tablet 1 tablet     Family History     None        Social History Main Topics    Smoking status: Never Smoker    Smokeless tobacco: Never Used    Alcohol use Not on file    Drug use: No    Sexual activity: Not on file     ROS   Unable to obtain 2/2 AMS.    Objective:     Vital Signs (Most Recent):  Temp: 98.6 °F (37 °C) (07/03/17 1556)  Pulse: 93 (07/03/17 1931)  Resp: 15 (07/03/17 1931)  BP: (!) 153/83 (07/03/17 1931)  SpO2: 97 % (07/03/17 1931) Vital Signs (24h Range):  Temp:  [97.7 °F (36.5 °C)-98.6 °F (37 °C)] 98.6 °F (37 °C)  Pulse:  [] 93  Resp:  [15-19] 15  SpO2:  [97 %-98 %] 97 %  BP: (140-170)/(82-90) 153/83     Weight: 85.3 kg (188 lb)  Height: 5' 9" (175.3 cm)  Body mass index is 27.76 kg/m².      Intake/Output Summary (Last 24 hours) at 07/03/17 2155  Last data filed at 07/03/17 1300   Gross per 24 hour   Intake              280 ml   Output                0 ml   Net              280 ml       Ortho/SPM Exam   MSK:  LLE:   Skin intact  No deformity or ecchymoses  L knee effusion  TTP around knee joint line  Compartments soft and compressible  Significant pain with passive ROM  Neuro exam unable to be performed 2/2 AMS  Brisk cap refill  Warm well perfused extremities  DP palpable    Procedure note:  After verbal consent was obtained, patient's L knee was prepped with chlorhexidine.  An 18 gauge needle was used to aspirate the L knee joint using a superolateral approach.  Approximately 40 mL of clear, yellow fluid was aspirated.  Fluid was sent to the lab for analysis.  Area was cleansed and dressed.  Patient tolerated procedure with no complications and minimal blood loss.    Significant Labs: All pertinent labs within the past 24 hours have been reviewed.     WBC 12, 78% segs, CRP 38, ESR 61, urate 5.4, procalc 0.09    Joint aspirate: WBC 9400, crystals urate positive, gram stain with few WBCs and " no organisms, no stringing    Significant Imaging: I have reviewed all pertinent imaging results/findings.     XR L knee shows no joint destruction with lateral chondrocalcinosis.

## 2017-07-04 NOTE — ASSESSMENT & PLAN NOTE
Plan to resume PT/OT in the am. Speech therapy evaluated patient and recommending pureed diet with thin liquids.

## 2017-07-04 NOTE — HPI
84 y.o. 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 History obtained from family at bedside due to patient's somnolence. The night before presentation he complained of left knee pain and was given a dose of Tramadol, which he had never had before.  Shortly after getting that he became confused and somnolent, not recognizing family.  He slept all through the day and could only be roused to drink some fluids but would not eat or speak, nor participate in therapy. In the ED he was noted to have a warm, swollen left knee upon when arthrocentesis was performed and results consistent with acute gout. He has no prior known history of gout, does not regularly eat shellfish, nor does he drink beer.

## 2017-07-04 NOTE — PLAN OF CARE
Problem: Patient Care Overview  Goal: Plan of Care Review  Outcome: Ongoing (interventions implemented as appropriate)  Patient AAOX3, VSS.  Two side rails up, bed locked, call light within reach. No falls noted as these precautions remain. Patient is free of skin breakdown as patient moves well independently. Blood glucose monitoring ordered. Pain controlled well with PRN meds. Hourly rounds made and no complaints at this time noted. Will resume with plan of care.

## 2017-07-04 NOTE — ASSESSMENT & PLAN NOTE
Will need to be cautious with renally cleared medications, such as allopurinol and colchicine.  His CKD may be his dominant gout risk factor due to decreased uric acid clearance.

## 2017-07-04 NOTE — ASSESSMENT & PLAN NOTE
On Tresiba 40 units with prandial novolog.  Will convert to Levemir 64 units qam with 12 units Novolog AC with low-dose SSI.  Family counseled that his BG may be more difficult to control with steroid use.

## 2017-07-04 NOTE — ASSESSMENT & PLAN NOTE
Patient is at baseline renal function and controlled at present. Will avoid any nephrotoxic agents such as NSAIDS, IV contrast or aminoglycosides unless necessary while hospitalized and will renally adjust medications based on patient's creatinine clearance. Strict I+Os. Monitor daily BMP to monitor renal function.

## 2017-07-04 NOTE — CONSULTS
[facility]  Orthopedics  Consult Note    Patient Name: Emmett Perez  MRN: [id number]  Admission Date: 7/3/2017  Hospital Length of Stay: 0 days  Attending Provider: Chao Mazariegos MD  Primary Care Provider: Eb Matthews MD    Patient information was obtained from spouse/SO, past medical records and ER records.     Inpatient consult to Orthopedic Surgery  Consult performed by: PAMELA POLK  Consult ordered by: YUNIOR QUINTEROS        Subjective:     Principal Problem:Somnolence    Chief Complaint:   Chief Complaint   Patient presents with    Altered Mental Status     in SNF rehab post CVA 2 weeks ago - c/o knee pain yesterday and was given tramadol at 2230- started with AMS that has progressed to alert to only self.         HPI: 84 YOM with hx of:CVA, HTN, DM II, Alzheimer disease, and CKD  that presents to the ED via EMS with a complaint of AMS of 2 days duration.     Per wife, pt had a CVA and was at [facility] for rehab. They state that while he was in rehab yesterday, he complained of left knee pain and was given one dose of Tramadol. Relatives state yesterday around 1 PM he was unable to recognize anyone and seemed as if he was hallucinating. They state he was fine afterwards. However, at night he was again unable to recognize anyone and was unresponsive. Relatives express concern because they state at baseline patient is interactive, responsive and eats well. However, they note a significant change from yesterday morning as patient has not been responsive or eating well.    Knee pain began quickly and is severe.  Never had pain like this before.  Pain with movement and walking.  Worsening.  No h/o septic joint, gout, inflammatory arthritis.    Past Medical History:   Diagnosis Date    Alzheimer disease     CKD (chronic kidney disease) stage 3, GFR 30-59 ml/min     Hypertension     Seizure     Stroke     Type 2 diabetes mellitus with diabetic neuropathy         Past Surgical History:   Procedure Laterality Date    CERVICAL SPINE SURGERY  2007    CORONARY ANGIOPLASTY WITH STENT PLACEMENT      thyroid nodule  1997       Review of patient's allergies indicates:  No Known Allergies    No current facility-administered medications for this encounter.      Facility-Administered Medications Ordered in Other Encounters   Medication    [MAR Hold - Suspended Admission] 0.9%  NaCl infusion    [MAR Hold - Suspended Admission] acetaminophen tablet 650 mg    [MAR Hold - Suspended Admission] albuterol-ipratropium 2.5mg-0.5mg/3mL nebulizer solution 3 mL    [MAR Hold - Suspended Admission] aluminum-magnesium hydroxide-simethicone 200-200-20 mg/5 mL suspension 15 mL    [MAR Hold - Suspended Admission] amlodipine tablet 10 mg    [MAR Hold - Suspended Admission] atorvastatin tablet 40 mg    [MAR Hold - Suspended Admission] clopidogrel tablet 75 mg    [MAR Hold - Suspended Admission] diclofenac sodium 1 % gel    [MAR Hold - Suspended Admission] donepezil tablet 10 mg    [MAR Hold - Suspended Admission] enoxaparin injection 40 mg    [MAR Hold - Suspended Admission] guaifenesin 100 mg/5 ml syrup 200 mg    [MAR Hold - Suspended Admission] hydrALAZINE tablet 25 mg    [MAR Hold - Suspended Admission] insulin aspart pen 0-5 Units    [MAR Hold - Suspended Admission] insulin aspart pen 9 Units    [MAR Hold - Suspended Admission] insulin detemir pen 23 Units    [MAR Hold - Suspended Admission] losartan tablet 25 mg    [MAR Hold - Suspended Admission] memantine tablet 10 mg    [MAR Hold - Suspended Admission] metoprolol succinate (TOPROL-XL) 24 hr tablet 25 mg    [MAR Hold - Suspended Admission] polyethylene glycol packet 17 g    [MAR Hold - Suspended Admission] ramelteon tablet 8 mg    [MAR Hold - Suspended Admission] senna-docusate 8.6-50 mg per tablet 1 tablet     Family History     None        Social History Main Topics    Smoking status: Never Smoker    Smokeless  "tobacco: Never Used    Alcohol use Not on file    Drug use: No    Sexual activity: Not on file     ROS   Unable to obtain 2/2 AMS.    Objective:     Vital Signs (Most Recent):  Temp: 98.6 °F (37 °C) (07/03/17 1556)  Pulse: 93 (07/03/17 1931)  Resp: 15 (07/03/17 1931)  BP: (!) 153/83 (07/03/17 1931)  SpO2: 97 % (07/03/17 1931) Vital Signs (24h Range):  Temp:  [97.7 °F (36.5 °C)-98.6 °F (37 °C)] 98.6 °F (37 °C)  Pulse:  [] 93  Resp:  [15-19] 15  SpO2:  [97 %-98 %] 97 %  BP: (140-170)/(82-90) 153/83     Weight: 85.3 kg (188 lb)  Height: 5' 9" (175.3 cm)  Body mass index is 27.76 kg/m².      Intake/Output Summary (Last 24 hours) at 07/03/17 2155  Last data filed at 07/03/17 1300   Gross per 24 hour   Intake              280 ml   Output                0 ml   Net              280 ml       Ortho/SPM Exam   MSK:  LLE:   Skin intact  No deformity or ecchymoses  L knee effusion  TTP around knee joint line  Compartments soft and compressible  Significant pain with passive ROM  Neuro exam unable to be performed 2/2 AMS  Brisk cap refill  Warm well perfused extremities  DP palpable    Procedure note:  After verbal consent was obtained, patient's L knee was prepped with chlorhexidine.  An 18 gauge needle was used to aspirate the L knee joint using a superolateral approach.  Approximately 40 mL of clear, yellow fluid was aspirated.  Fluid was sent to the lab for analysis.  Area was cleansed and dressed.  Patient tolerated procedure with no complications and minimal blood loss.    Significant Labs: All pertinent labs within the past 24 hours have been reviewed.     WBC 12, 78% segs, CRP 38, ESR 61, urate 5.4, procalc 0.09    Joint aspirate: WBC 9400, crystals urate positive, gram stain with few WBCs and no organisms, no stringing    Significant Imaging: I have reviewed all pertinent imaging results/findings.     XR L knee shows no joint destruction with lateral chondrocalcinosis.    Assessment/Plan:     Acute gout of left " knee    Patient with no concern for infection at this time due to low WBC of joint fluid and negative procalcitonin.  Joint aspirate positive for urate crystals.  Recommend treatment with NSAIDs or colchicine if patient's kidney function allows.            Thank you for your consult. I will sign off. Please contact us if you have any additional questions.    Lasha Terry MD  Orthopedics  [facility]

## 2017-07-04 NOTE — ED NOTES
Pt identifiers Emmett Perez were checked and correct  LOC: The patient is lethargic.  APPEARANCE: Pt resting comfortably, in no acute distress, pt is clean and well groomed, clothing properly fastened  SKIN: Skin warm, dry and intact, normal skin turgor, moist mucus membranes  RESPIRATORY: Airway is open and patent, respirations are spontaneous, even and unlabored, normal effort and rate  CARDIAC: Normal rate and rhythm, no peripheral edema noted, capillary refill < 3 seconds, bilateral radial pulses 2+  ABDOMEN: Soft, non tender, non distended. Bowel sounds present x 4 quadrants.    NEUROLOGIC: PERRLA, facial expression is symmetrical, pt lethargic. Pt has right sided weakness d/t previous stroke.    MUSCULOSKELETAL: No obvious deformities. Swelling noted to left knee, warm to touch.

## 2017-07-04 NOTE — PLAN OF CARE
Problem: SLP Goal  Goal: SLP Goal  Speech Therapy Short Term Goals  Goals expected to be met by  7/11  1. Pt will tolerate puree diet and thin liquids with no overt s/s of aspiration noted.   2. Pt will tolerate dental soft trials x10 for possible diet upgrade with adequate oral clearance and no overt s/s of aspiration.     Outcome: Ongoing (interventions implemented as appropriate)  Bedside swallow study completed. Pt with a h/o dysphagia with thin liquids via straw. Pt chewed up solid trial and spit it out. No overt s/s of aspiration with thin liquid via cup and tsp bites of puree. Recommend: Pureed diet, thin liquids, crushed po medications, NO STRAWS, feed only when awake/alert, HOB elevated to 90 degrees, small bites and sips 1 at a time, 1:1 assistance with meals. ST will continue to follow.   REX Hines., CCC-SLP  07/04/2017

## 2017-07-04 NOTE — SUBJECTIVE & OBJECTIVE
Past Medical History:   Diagnosis Date    Alzheimer disease     CKD (chronic kidney disease) stage 3, GFR 30-59 ml/min     Hypertension     Seizure     Stroke     Type 2 diabetes mellitus with diabetic neuropathy        Past Surgical History:   Procedure Laterality Date    CERVICAL SPINE SURGERY      CORONARY ANGIOPLASTY WITH STENT PLACEMENT      thyroid nodule         Review of patient's allergies indicates:   Allergen Reactions    Tramadol Other (See Comments)     Excessive somnolence and confusion, lasting more than 24 hours after a single dose       Current Facility-Administered Medications on File Prior to Encounter   Medication    [] 0.9%  NaCl infusion    [MAR Hold - Suspended Admission] acetaminophen tablet 650 mg    [MAR Hold - Suspended Admission] albuterol-ipratropium 2.5mg-0.5mg/3mL nebulizer solution 3 mL    [MAR Hold - Suspended Admission] aluminum-magnesium hydroxide-simethicone 200-200-20 mg/5 mL suspension 15 mL    [MAR Hold - Suspended Admission] amlodipine tablet 10 mg    [MAR Hold - Suspended Admission] atorvastatin tablet 40 mg    [MAR Hold - Suspended Admission] clopidogrel tablet 75 mg    [MAR Hold - Suspended Admission] diclofenac sodium 1 % gel    [MAR Hold - Suspended Admission] donepezil tablet 10 mg    [MAR Hold - Suspended Admission] enoxaparin injection 40 mg    [MAR Hold - Suspended Admission] guaifenesin 100 mg/5 ml syrup 200 mg    [MAR Hold - Suspended Admission] hydrALAZINE tablet 25 mg    [MAR Hold - Suspended Admission] insulin aspart pen 0-5 Units    [MAR Hold - Suspended Admission] insulin aspart pen 9 Units    [MAR Hold - Suspended Admission] insulin detemir pen 23 Units    [MAR Hold - Suspended Admission] losartan tablet 25 mg    [MAR Hold - Suspended Admission] memantine tablet 10 mg    [MAR Hold - Suspended Admission] metoprolol succinate (TOPROL-XL) 24 hr tablet 25 mg    [MAR Hold - Suspended Admission] polyethylene glycol packet  "17 g    [MAR Hold - Suspended Admission] ramelteon tablet 8 mg    [MAR Hold - Suspended Admission] senna-docusate 8.6-50 mg per tablet 1 tablet    [DISCONTINUED] benzonatate capsule 100 mg    [DISCONTINUED] insulin aspart pen 7 Units    [DISCONTINUED] insulin detemir pen 19 Units    [DISCONTINUED] tramadol tablet 50 mg     Current Outpatient Prescriptions on File Prior to Encounter   Medication Sig    amlodipine (NORVASC) 10 MG tablet Take 10 mg by mouth once daily.    aspirin 81 MG Chew Take 81 mg by mouth once daily.    atorvastatin (LIPITOR) 40 MG tablet Take 1 tablet (40 mg total) by mouth once daily.    blood sugar diagnostic Strp Monitors blood sugar before meals and bedtime; disp with lancets; One Touch Verio glucometer    donepezil (ARICEPT) 10 MG tablet Take 10 mg by mouth once daily.    insulin aspart (NOVOLOG FLEXPEN) 100 unit/mL InPn pen Inject 14 Units into the skin 3 (three) times daily with meals.    insulin degludec (TRESIBA FLEXTOUCH U-200) 200 unit/mL (3 mL) InPn Inject 80 Units into the skin once daily. (Patient taking differently: Inject 40 Units into the skin nightly. )    lisinopril (PRINIVIL,ZESTRIL) 5 MG tablet Take 1 tablet (5 mg total) by mouth once daily.    memantine (NAMENDA) 10 MG Tab Take 10 mg by mouth 2 (two) times daily.    metoprolol succinate (TOPROL-XL) 25 MG 24 hr tablet Take 1 tablet (25 mg total) by mouth once daily.    pen needle, diabetic (BD ULTRA-FINE TARYN PEN NEEDLES) 32 gauge x 5/32" Ndle Uses 5 daily, on multiple daily insulin injections     Family History     None        Social History Main Topics    Smoking status: Never Smoker    Smokeless tobacco: Never Used    Alcohol use Not on file    Drug use: No    Sexual activity: Not on file     Review of Systems   Unable to perform ROS: Mental status change     Objective:     Vital Signs (Most Recent):  Temp: 98.6 °F (37 °C) (07/03/17 1556)  Pulse: 101 (07/03/17 2300)  Resp: 15 (07/03/17 1931)  BP: (!) " 153/83 (07/03/17 1931)  SpO2: 97 % (07/03/17 1931) Vital Signs (24h Range):  Temp:  [97.7 °F (36.5 °C)-98.6 °F (37 °C)] 98.6 °F (37 °C)  Pulse:  [] 101  Resp:  [15-19] 15  SpO2:  [97 %-98 %] 97 %  BP: (140-170)/(82-90) 153/83     Weight: 85.3 kg (188 lb)  Body mass index is 27.76 kg/m².    Physical Exam   Constitutional: He appears well-developed and well-nourished. He appears lethargic. He is sleeping and uncooperative.   HENT:   Head: Normocephalic and atraumatic.   Mouth/Throat: Oropharynx is clear and moist.   Eyes: Conjunctivae are normal. No scleral icterus.   Neck: No JVD present.   Cardiovascular: Normal rate, regular rhythm, normal heart sounds and intact distal pulses.  Exam reveals no gallop and no friction rub.    No murmur heard.  Pulmonary/Chest: Effort normal and breath sounds normal.   Abdominal: Soft. Bowel sounds are normal. He exhibits no distension. There is no tenderness. There is no guarding.   Musculoskeletal:   Left knee kept bent in 90-degree position.  There is a palpable effusion of the left knee, and it is warm to the touch.   Lymphadenopathy:     He has no cervical adenopathy.   Neurological: He appears lethargic. GCS eye subscore is 3. GCS verbal subscore is 3. GCS motor subscore is 5.   Does not speak or cooperate with exam.   Skin: Skin is warm and dry. There is erythema (mild erythema over left knee).   Nursing note and vitals reviewed.       Significant Labs:   CBC:   Recent Labs  Lab 07/03/17  0530 07/03/17  1646 07/03/17  1902 07/03/17 1954   WBC 11.12 7.73  --  11.93   HGB 14.0 9.4*  --  14.3   HCT 43.3 28.2* 45 41.6    161  --  249     CMP:   Recent Labs  Lab 07/03/17  0530 07/03/17  1812 07/03/17 2027    146* 142   K 4.3 3.2* 5.2*    120* 108   CO2 22* 18* 24   * 178* 218*   BUN 32* 28* 37*   CREATININE 1.5* 0.9 1.5*   CALCIUM 10.0 6.4* 9.8   PROT  --  4.9* 8.1   ALBUMIN  --  2.0* 3.1*   BILITOT  --  0.8 1.2*   ALKPHOS  --  91 142*   AST  --   15 28   ALT  --  14 24   ANIONGAP 12 8 10   EGFRNONAA 42.1* >60.0 42.1*     Urine Studies:   Recent Labs  Lab 07/03/17  1233 07/03/17  1708   COLORU Yellow Yellow   APPEARANCEUA Clear Clear   PHUR 5.0 5.0   SPECGRAV 1.015 1.010   PROTEINUA Negative Negative   GLUCUA 3+* 3+*   KETONESU Trace* Negative   BILIRUBINUA Negative Negative   OCCULTUA 2+* 2+*   NITRITE Negative Negative   UROBILINOGEN Negative Negative   LEUKOCYTESUR Negative Negative   RBCUA 1 2   WBCUA 1 1   BACTERIA Rare Rare   SQUAMEPITHEL 0  --    HYALINECASTS 1 4*       Significant Imaging: I have reviewed all pertinent imaging results/findings within the past 24 hours.

## 2017-07-04 NOTE — ASSESSMENT & PLAN NOTE
Likely due to delayed clearance of tramadol in setting of advanced age and CKD.  Avoid use of tramadol in future.  Monitor with neuro checks; anticipate clearing of mental status in am.

## 2017-07-04 NOTE — PROGRESS NOTES
Patient arrived to floor from ED via stretcher. Patient is awake,confudion at times,  and family is at the bedside. Skin is warm and dry. Pulses are present in all extremitities Neurovascularly intact. Mumbles some words, but some words are audible. Neurovascularly intact. No skin breakdown noted. Some left sided weakness noted.Some crackles noted to lung fields. Abdomen is soft and nontender. Hypoactive bowel sounds.Some left knee pain noted. Telemetry in progress. #16F in place draining clear yellow urine. Family at bedside. Questions encouraged.

## 2017-07-04 NOTE — ASSESSMENT & PLAN NOTE
Likely due to reduced uric acid clearance in CKD.  No dietary risk factors.  Given advanced age and insulin-dependent diabetes will not use kenalog injection; will instead use short course of prednisone 40mg daily x 5 days, which can be tapered out longer if needed.  Will also start colchicine 0.3mg daily x 5 days, then daily prn flare thereafter.  Use colchicine sparingly in setting of CKD.  Start allopurinol 100mg at discharge to reduce risk of recurrence.

## 2017-07-04 NOTE — HOSPITAL COURSE
Patient placed on observation for acute encephalopathy felt due to Tramadol. Patient did have CT scan of head done on 7/3 that showed evolving right thalamic infarction but no detrimental interval change from prior examination. Patient was started on Prednisone 40 mg po daily and Colchicine 0.3 mg po daily for treatment of acute gout to left knee. Patient was placed on just Tylenol alone for pain. Patient remained somnolent on 7/4 and plan is for MRI imaging of brain if no clinical improvement by 7/5. Patient's labs reviewed and no evidence to suggest infection as cause. Patient with no evidence to suggest seizure as cause. Electrolytes and renal function stable so metabolic encephalopathy not felt to be cause of somnolence. Patient on 7/5 more alert and responsive but still having some periodic episodes of somnolence but improving so no further neuro imaging done as felt likely Tramadol as cause. Patient was noted on 7/5 to have increased Cr from 1.4 to 1.7. Patient started on IVF's and Lisinopril held. Urine studies consistent with prerenal azotemia and patient responded to IVF's with improvement in creatinine to 1.3 on 7/6. IVF's discontinued on 7/6. PT/OT evaluated patient and recommends SNF. Patient on 7/6 much more alert and responsive and family reports patient is back to cognitive baseline. Social work and  working on SNF placement as patient is medically stable for discharge to a skilled nursing facility. Wife decided she wanted patient to return to [facility] so consult placed on 7/7. Patient restarted on Lisinopril for his heart failure and HTN as patient's BP was getting elevated with -180 off of Lisinopril and DASIA had resolved. Patient's daughter and wife wishes for patient to go to [place] for skilled care and continued hospitalization but patient had [employer] insurance of [place] that does not cover care in [place]. Family was very demanding and wanting patient in [place] but  patient was not medically ready for discharge to a home setting as both PT and OT recommending patient needed further inpatient rehab at SNF level and not safe for home discharge. Patient's wife decided against my medical advice to take patient home and patient's wife, Sirisha signed patient out of the hospital against medical advice on 7/9/2017. No prescriptions given to wife as she signed him out of hospital against my advice or was any outpatient rehab arranged or home health due to AMA status.

## 2017-07-04 NOTE — ASSESSMENT & PLAN NOTE
Patient with continued somnolence and felt likely due to delayed clearance of Tramadol in setting of advanced age and CKD.  Avoid use of tramadol in future.  Monitor with neuro checks; anticipate clearing of mental status in next 24 hours but if no improvement likely will need further neuro imaging with MRI of brain. No evidence to suggest metabolic cause as BUN only minimally elevated and sodium and calcium normal. Patient with no fever, tachycardia or leukocytosis to suggest infectious cause.

## 2017-07-04 NOTE — H&P
[facility] Hospital Medicine  History & Physical    Patient Name: Emmett Perez  MRN: [id number]  Admission Date: 7/3/2017  Attending Physician: Chao Mazariegos MD   Primary Care Provider: Eb Matthews MD    [facility] Medicine Team: Networked reference to record PCT  Chao Mazariegos MD     Patient information was obtained from relative(s), past medical records and ER records.     Subjective:     Principal Problem:Somnolence    Chief Complaint:   Chief Complaint   Patient presents with    Altered Mental Status     in SNF rehab post CVA 2 weeks ago - c/o knee pain yesterday and was given tramadol at 2230- started with AMS that has progressed to alert to only self.         HPI: 84M brought to ED from [facility] for somnolence and confusion.  History obtained from family at bedside due to patient's somnolence.  The night before presentation he complained of left knee pain and was given a dose of tramadol, which he had never had before.  Shortly after getting that he became confused and somnolent, not recognizing family.  He slept all through the day and could only be roused to drink some fluids but would not eat or speak, nor participate in therapy.  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  In the ED he was noted to have a warm, swollen left knee upon when arthrocentesis was performed.  He has no prior known history of gout, does not regularly eat shellfish, nor does he drink beer.      Past Medical History:   Diagnosis Date    Alzheimer disease     CKD (chronic kidney disease) stage 3, GFR 30-59 ml/min     Hypertension     Seizure     Stroke     Type 2 diabetes mellitus with diabetic neuropathy        Past Surgical History:   Procedure Laterality Date    CERVICAL SPINE SURGERY  2007    CORONARY ANGIOPLASTY WITH STENT PLACEMENT      thyroid nodule  1997       Review of patient's allergies indicates:   Allergen  Reactions    Tramadol Other (See Comments)     Excessive somnolence and confusion, lasting more than 24 hours after a single dose       Current Facility-Administered Medications on File Prior to Encounter   Medication    [] 0.9%  NaCl infusion    [MAR Hold - Suspended Admission] acetaminophen tablet 650 mg    [MAR Hold - Suspended Admission] albuterol-ipratropium 2.5mg-0.5mg/3mL nebulizer solution 3 mL    [MAR Hold - Suspended Admission] aluminum-magnesium hydroxide-simethicone 200-200-20 mg/5 mL suspension 15 mL    [MAR Hold - Suspended Admission] amlodipine tablet 10 mg    [MAR Hold - Suspended Admission] atorvastatin tablet 40 mg    [MAR Hold - Suspended Admission] clopidogrel tablet 75 mg    [MAR Hold - Suspended Admission] diclofenac sodium 1 % gel    [MAR Hold - Suspended Admission] donepezil tablet 10 mg    [MAR Hold - Suspended Admission] enoxaparin injection 40 mg    [MAR Hold - Suspended Admission] guaifenesin 100 mg/5 ml syrup 200 mg    [MAR Hold - Suspended Admission] hydrALAZINE tablet 25 mg    [MAR Hold - Suspended Admission] insulin aspart pen 0-5 Units    [MAR Hold - Suspended Admission] insulin aspart pen 9 Units    [MAR Hold - Suspended Admission] insulin detemir pen 23 Units    [MAR Hold - Suspended Admission] losartan tablet 25 mg    [MAR Hold - Suspended Admission] memantine tablet 10 mg    [MAR Hold - Suspended Admission] metoprolol succinate (TOPROL-XL) 24 hr tablet 25 mg    [MAR Hold - Suspended Admission] polyethylene glycol packet 17 g    [MAR Hold - Suspended Admission] ramelteon tablet 8 mg    [MAR Hold - Suspended Admission] senna-docusate 8.6-50 mg per tablet 1 tablet    [DISCONTINUED] benzonatate capsule 100 mg    [DISCONTINUED] insulin aspart pen 7 Units    [DISCONTINUED] insulin detemir pen 19 Units    [DISCONTINUED] tramadol tablet 50 mg     Current Outpatient Prescriptions on File Prior to Encounter   Medication Sig    amlodipine (NORVASC) 10 MG  "tablet Take 10 mg by mouth once daily.    aspirin 81 MG Chew Take 81 mg by mouth once daily.    atorvastatin (LIPITOR) 40 MG tablet Take 1 tablet (40 mg total) by mouth once daily.    blood sugar diagnostic Strp Monitors blood sugar before meals and bedtime; disp with lancets; One Touch Verio glucometer    donepezil (ARICEPT) 10 MG tablet Take 10 mg by mouth once daily.    insulin aspart (NOVOLOG FLEXPEN) 100 unit/mL InPn pen Inject 14 Units into the skin 3 (three) times daily with meals.    insulin degludec (TRESIBA FLEXTOUCH U-200) 200 unit/mL (3 mL) InPn Inject 80 Units into the skin once daily. (Patient taking differently: Inject 40 Units into the skin nightly. )    lisinopril (PRINIVIL,ZESTRIL) 5 MG tablet Take 1 tablet (5 mg total) by mouth once daily.    memantine (NAMENDA) 10 MG Tab Take 10 mg by mouth 2 (two) times daily.    metoprolol succinate (TOPROL-XL) 25 MG 24 hr tablet Take 1 tablet (25 mg total) by mouth once daily.    pen needle, diabetic (BD ULTRA-FINE TARYN PEN NEEDLES) 32 gauge x 5/32" Ndle Uses 5 daily, on multiple daily insulin injections     Family History     None        Social History Main Topics    Smoking status: Never Smoker    Smokeless tobacco: Never Used    Alcohol use Not on file    Drug use: No    Sexual activity: Not on file     Review of Systems   Unable to perform ROS: Mental status change     Objective:     Vital Signs (Most Recent):  Temp: 98.6 °F (37 °C) (07/03/17 1556)  Pulse: 101 (07/03/17 2300)  Resp: 15 (07/03/17 1931)  BP: (!) 153/83 (07/03/17 1931)  SpO2: 97 % (07/03/17 1931) Vital Signs (24h Range):  Temp:  [97.7 °F (36.5 °C)-98.6 °F (37 °C)] 98.6 °F (37 °C)  Pulse:  [] 101  Resp:  [15-19] 15  SpO2:  [97 %-98 %] 97 %  BP: (140-170)/(82-90) 153/83     Weight: 85.3 kg (188 lb)  Body mass index is 27.76 kg/m².    Physical Exam   Constitutional: He appears well-developed and well-nourished. He appears lethargic. He is sleeping and uncooperative.   HENT: "   Head: Normocephalic and atraumatic.   Mouth/Throat: Oropharynx is clear and moist.   Eyes: Conjunctivae are normal. No scleral icterus.   Neck: No JVD present.   Cardiovascular: Normal rate, regular rhythm, normal heart sounds and intact distal pulses.  Exam reveals no gallop and no friction rub.    No murmur heard.  Pulmonary/Chest: Effort normal and breath sounds normal.   Abdominal: Soft. Bowel sounds are normal. He exhibits no distension. There is no tenderness. There is no guarding.   Musculoskeletal:   Left knee kept bent in 90-degree position.  There is a palpable effusion of the left knee, and it is warm to the touch.   Lymphadenopathy:     He has no cervical adenopathy.   Neurological: He appears lethargic. GCS eye subscore is 3. GCS verbal subscore is 3. GCS motor subscore is 5.   Does not speak or cooperate with exam.   Skin: Skin is warm and dry. There is erythema (mild erythema over left knee).   Nursing note and vitals reviewed.       Significant Labs:   CBC:   Recent Labs  Lab 07/03/17  0530 07/03/17  1646 07/03/17  1902 07/03/17  1954   WBC 11.12 7.73  --  11.93   HGB 14.0 9.4*  --  14.3   HCT 43.3 28.2* 45 41.6    161  --  249     CMP:   Recent Labs  Lab 07/03/17  0530 07/03/17  1812 07/03/17 2027    146* 142   K 4.3 3.2* 5.2*    120* 108   CO2 22* 18* 24   * 178* 218*   BUN 32* 28* 37*   CREATININE 1.5* 0.9 1.5*   CALCIUM 10.0 6.4* 9.8   PROT  --  4.9* 8.1   ALBUMIN  --  2.0* 3.1*   BILITOT  --  0.8 1.2*   ALKPHOS  --  91 142*   AST  --  15 28   ALT  --  14 24   ANIONGAP 12 8 10   EGFRNONAA 42.1* >60.0 42.1*     Urine Studies:   Recent Labs  Lab 07/03/17  1233 07/03/17  1708   COLORU Yellow Yellow   APPEARANCEUA Clear Clear   PHUR 5.0 5.0   SPECGRAV 1.015 1.010   PROTEINUA Negative Negative   GLUCUA 3+* 3+*   KETONESU Trace* Negative   BILIRUBINUA Negative Negative   OCCULTUA 2+* 2+*   NITRITE Negative Negative   UROBILINOGEN Negative Negative   LEUKOCYTESUR Negative  Negative   RBCUA 1 2   WBCUA 1 1   BACTERIA Rare Rare   SQUAMEPITHEL 0  --    HYALINECASTS 1 4*       Significant Imaging: I have reviewed all pertinent imaging results/findings within the past 24 hours.    Assessment/Plan:     Acute gout of left knee    Likely due to reduced uric acid clearance in CKD.  No dietary risk factors.  Given advanced age and insulin-dependent diabetes will not use kenalog injection; will instead use short course of prednisone 40mg daily x 5 days, which can be tapered out longer if needed.  Will also start colchicine 0.3mg daily x 5 days, then daily prn flare thereafter.  Use colchicine sparingly in setting of CKD.  Start allopurinol 100mg at discharge to reduce risk of recurrence.            Combined systolic and diastolic cardiac dysfunction    Without evidence of decompensation presently.  Continue metoprolol, lisinopril.          Hemiparesis, left    Resume PT once inflammation starts to subside          Essential hypertension    Continue amlodipine, lisinopril, toprol xl          Dementia without behavioral disturbance    Likely more susceptible to effects of sedating medications.  Avoid tramadol in future.  Continue Namenda and Aricept.  Connoquenessing delirium precautions.          Type 2 diabetes mellitus with diabetic polyneuropathy, with long-term current use of insulin    On Tresiba 40 units with prandial novolog.  Will convert to Levemir 64 units qam with 12 units Novolog AC with low-dose SSI.  Family counseled that his BG may be more difficult to control with steroid use.          Chronic kidney disease, stage III (moderate)    Will need to be cautious with renally cleared medications, such as allopurinol and colchicine.  His CKD may be his dominant gout risk factor due to decreased uric acid clearance.            * Somnolence    Likely due to delayed clearance of tramadol in setting of advanced age and CKD.  Avoid use of tramadol in future.  Monitor with neuro checks; anticipate  clearing of mental status in am.            VTE Risk Mitigation         Ordered     enoxaparin injection 40 mg  Daily     Route:  Subcutaneous        07/04/17 0005     Medium Risk of VTE  Once      07/04/17 0005        Chao Mazariegos MD  Department of [facility] Medicine   [facility]

## 2017-07-05 ENCOUNTER — TELEPHONE (OUTPATIENT)
Dept: NEUROLOGY | Facility: CLINIC | Age: 82
End: 2017-07-05

## 2017-07-05 PROBLEM — M10.362 ACUTE GOUT DUE TO RENAL IMPAIRMENT INVOLVING LEFT KNEE: Status: ACTIVE | Noted: 2017-07-03

## 2017-07-05 PROBLEM — N17.9 AKI (ACUTE KIDNEY INJURY): Status: ACTIVE | Noted: 2017-07-05

## 2017-07-05 LAB
ANION GAP SERPL CALC-SCNC: 7 MMOL/L
BACTERIA #/AREA URNS AUTO: ABNORMAL /HPF
BACTERIA UR CULT: NO GROWTH
BILIRUB UR QL STRIP: NEGATIVE
BUN SERPL-MCNC: 61 MG/DL
CALCIUM SERPL-MCNC: 9.7 MG/DL
CHLORIDE SERPL-SCNC: 113 MMOL/L
CLARITY UR REFRACT.AUTO: CLEAR
CO2 SERPL-SCNC: 25 MMOL/L
COLOR UR AUTO: ABNORMAL
CREAT SERPL-MCNC: 1.7 MG/DL
CREAT UR-MCNC: 56 MG/DL
EST. GFR  (AFRICAN AMERICAN): 41.9 ML/MIN/1.73 M^2
EST. GFR  (NON AFRICAN AMERICAN): 36.2 ML/MIN/1.73 M^2
GLUCOSE SERPL-MCNC: 256 MG/DL
GLUCOSE UR QL STRIP: ABNORMAL
HGB UR QL STRIP: ABNORMAL
KETONES UR QL STRIP: NEGATIVE
LEUKOCYTE ESTERASE UR QL STRIP: ABNORMAL
MICROSCOPIC COMMENT: ABNORMAL
NITRITE UR QL STRIP: NEGATIVE
PH UR STRIP: 5 [PH] (ref 5–8)
POCT GLUCOSE: 197 MG/DL (ref 70–110)
POCT GLUCOSE: 227 MG/DL (ref 70–110)
POCT GLUCOSE: 275 MG/DL (ref 70–110)
POCT GLUCOSE: 381 MG/DL (ref 70–110)
POTASSIUM SERPL-SCNC: 4.2 MMOL/L
PROT UR QL STRIP: NEGATIVE
RBC #/AREA URNS AUTO: 7 /HPF (ref 0–4)
SODIUM SERPL-SCNC: 145 MMOL/L
SODIUM UR-SCNC: 37 MMOL/L
SP GR UR STRIP: 1.01 (ref 1–1.03)
SQUAMOUS #/AREA URNS AUTO: 0 /HPF
URN SPEC COLLECT METH UR: ABNORMAL
UROBILINOGEN UR STRIP-ACNC: NEGATIVE EU/DL
UUN UR-MCNC: 853 MG/DL
WBC #/AREA URNS AUTO: 6 /HPF (ref 0–5)
YEAST UR QL AUTO: ABNORMAL

## 2017-07-05 PROCEDURE — 97535 SELF CARE MNGMENT TRAINING: CPT

## 2017-07-05 PROCEDURE — 80048 BASIC METABOLIC PNL TOTAL CA: CPT

## 2017-07-05 PROCEDURE — 25000003 PHARM REV CODE 250: Performed by: HOSPITALIST

## 2017-07-05 PROCEDURE — 63600175 PHARM REV CODE 636 W HCPCS: Performed by: HOSPITALIST

## 2017-07-05 PROCEDURE — 11000001 HC ACUTE MED/SURG PRIVATE ROOM

## 2017-07-05 PROCEDURE — 81001 URINALYSIS AUTO W/SCOPE: CPT

## 2017-07-05 PROCEDURE — 99232 SBSQ HOSP IP/OBS MODERATE 35: CPT | Mod: ,,, | Performed by: INTERNAL MEDICINE

## 2017-07-05 PROCEDURE — 84300 ASSAY OF URINE SODIUM: CPT

## 2017-07-05 PROCEDURE — 36415 COLL VENOUS BLD VENIPUNCTURE: CPT

## 2017-07-05 PROCEDURE — 84540 ASSAY OF URINE/UREA-N: CPT

## 2017-07-05 PROCEDURE — 92526 ORAL FUNCTION THERAPY: CPT

## 2017-07-05 PROCEDURE — 82570 ASSAY OF URINE CREATININE: CPT

## 2017-07-05 RX ORDER — INSULIN ASPART 100 [IU]/ML
17 INJECTION, SOLUTION INTRAVENOUS; SUBCUTANEOUS
Status: DISCONTINUED | OUTPATIENT
Start: 2017-07-06 | End: 2017-07-06

## 2017-07-05 RX ADMIN — ACETAMINOPHEN 650 MG: 325 TABLET ORAL at 06:07

## 2017-07-05 RX ADMIN — MEMANTINE HYDROCHLORIDE 10 MG: 10 TABLET ORAL at 10:07

## 2017-07-05 RX ADMIN — INSULIN ASPART 14 UNITS: 100 INJECTION, SOLUTION INTRAVENOUS; SUBCUTANEOUS at 05:07

## 2017-07-05 RX ADMIN — INSULIN ASPART 1 UNITS: 100 INJECTION, SOLUTION INTRAVENOUS; SUBCUTANEOUS at 10:07

## 2017-07-05 RX ADMIN — ASPIRIN 81 MG CHEWABLE TABLET 81 MG: 81 TABLET CHEWABLE at 08:07

## 2017-07-05 RX ADMIN — INSULIN ASPART 14 UNITS: 100 INJECTION, SOLUTION INTRAVENOUS; SUBCUTANEOUS at 01:07

## 2017-07-05 RX ADMIN — AMLODIPINE BESYLATE 10 MG: 10 TABLET ORAL at 08:07

## 2017-07-05 RX ADMIN — INSULIN ASPART 2 UNITS: 100 INJECTION, SOLUTION INTRAVENOUS; SUBCUTANEOUS at 01:07

## 2017-07-05 RX ADMIN — PREDNISONE 40 MG: 20 TABLET ORAL at 08:07

## 2017-07-05 RX ADMIN — ATORVASTATIN CALCIUM 40 MG: 20 TABLET, FILM COATED ORAL at 08:07

## 2017-07-05 RX ADMIN — INSULIN ASPART 14 UNITS: 100 INJECTION, SOLUTION INTRAVENOUS; SUBCUTANEOUS at 08:07

## 2017-07-05 RX ADMIN — MEMANTINE HYDROCHLORIDE 10 MG: 10 TABLET ORAL at 08:07

## 2017-07-05 RX ADMIN — ENOXAPARIN SODIUM 40 MG: 100 INJECTION SUBCUTANEOUS at 05:07

## 2017-07-05 RX ADMIN — INSULIN ASPART 5 UNITS: 100 INJECTION, SOLUTION INTRAVENOUS; SUBCUTANEOUS at 05:07

## 2017-07-05 RX ADMIN — METOPROLOL SUCCINATE 25 MG: 25 TABLET, EXTENDED RELEASE ORAL at 08:07

## 2017-07-05 RX ADMIN — DONEPEZIL HYDROCHLORIDE 10 MG: 10 TABLET, FILM COATED ORAL at 08:07

## 2017-07-05 NOTE — ASSESSMENT & PLAN NOTE
Controlled. Patient without evidence of decompensation presently.  Plan to continue Metoprolol XL to treat. Hold Lisinopril in light of DASIA.

## 2017-07-05 NOTE — ASSESSMENT & PLAN NOTE
Type 2 diabetes mellitus with diabetic polyneuropathy, with long-term current use of insulin  Type 2 diabetes mellitus with stage 3 chronic kidney disease, with long-term current use of insulin  Suboptimal control in the hospital and likely related to poor control prior to this hospital stay and addition of Prednisone to treat acute gout. HgA1C 9.8% and not at goal on 6/22. Plan is to increase Levemir from 68 to 74 units at bedtime and increase Novolog from 14 to 17 units with meals. Monitor POCT glucose 4 times a day with each meal and at bedtime and cover with Novolog low dose sliding scale insulin. 2000 calorie diabetic diet. Target pre-meal glucose goal is <140 with all random glucoses <180 in non-critically ill patient.

## 2017-07-05 NOTE — PROGRESS NOTES
[facility] Hospital Medicine  Progress Note    Patient Name: Emmett Perez  MRN: [id number]  Patient Class: IP- Inpatient   Admission Date: 7/3/2017  Length of Stay: 1 days  Attending Physician: Unique De Jesus MD  Primary Care Provider: Eb Matthews MD    [facility] Medicine Team: [facility] HOSP MED K Unique De Jesus MD    Subjective:     Principal Problem:Acute encephalopathy    HPI:  84 y.o. 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 History obtained from family at bedside due to patient's somnolence. The night before presentation he complained of left knee pain and was given a dose of Tramadol, which he had never had before.  Shortly after getting that he became confused and somnolent, not recognizing family.  He slept all through the day and could only be roused to drink some fluids but would not eat or speak, nor participate in therapy. In the ED he was noted to have a warm, swollen left knee upon when arthrocentesis was performed and results consistent with acute gout. He has no prior known history of gout, does not regularly eat shellfish, nor does he drink beer.      Hospital Course:  Patient placed on observation for acute encephalopathy felt due to Tramadol. Patient did have CT scan of head done on 7/3 that showed evolving right thalamic infarction but no detrimental interval change from prior examination. Patient was started on Prednisone 40 mg po daily and Colchicine 0.3 mg po daily for treatment of acute gout to left knee. Patient was placed on just Tylenol alone for pain. Patient remained somnolent  on 7/4 and plan is for MRI imaging of brain if no clinical improvement by 7/5. Patient's labs reviewed and no evidence to suggest infection as cause. Patient with no evidence to suggest seizure as cause. Electrolytes and renal function stable so metabolic encephalopathy not felt to be cause of somnolence. Patient on 7/5 more alert and responsive but still having some periodic episodes of somnolence but improving so no further neuro imaging done as felt likely Tramadol as cause. Patient was noted on 7/5 to have increased Cr from 1.4 to 1.7. Patient started on IVF's and Lisinopril held.     Interval History: Patient more alert today and able to respond to questioning. Patient with decreased pain to left knee today.     Review of Systems   Constitutional: Negative for chills and fever.   Respiratory: Negative for cough.    Cardiovascular: Negative for chest pain and leg swelling.   Gastrointestinal: Negative for nausea and vomiting.   Genitourinary: Negative for flank pain.   Musculoskeletal: Positive for myalgias (Left knee pain).   Skin: Negative for rash.   Neurological: Negative for tremors and light-headedness.   Psychiatric/Behavioral: Positive for confusion (Mild).     Objective:     Vital Signs (Most Recent):  Temp: 96.9 °F (36.1 °C) (07/05/17 1605)  Pulse: 86 (07/05/17 1605)  Resp: 18 (07/05/17 1605)  BP: (!) 146/71 (07/05/17 1605)  SpO2: (!) 92 % (07/05/17 1605) Vital Signs (24h Range):  Temp:  [95.9 °F (35.5 °C)-98.6 °F (37 °C)] 96.9 °F (36.1 °C)  Pulse:  [] 86  Resp:  [18] 18  SpO2:  [92 %-97 %] 92 %  BP: (121-160)/(62-81) 146/71     Weight: 85.3 kg (188 lb)  Body mass index is 27.76 kg/m².    Intake/Output Summary (Last 24 hours) at 07/05/17 1812  Last data filed at 07/05/17 1600   Gross per 24 hour   Intake                0 ml   Output             1575 ml   Net            -1575 ml      Physical Exam   Constitutional: He appears well-developed and well-nourished. He appears lethargic. No distress.    HENT:   Mouth/Throat: Oropharynx is clear and moist.   Eyes: Conjunctivae are normal.   Neck: No JVD present.   Cardiovascular: Normal rate, regular rhythm and normal heart sounds.  Exam reveals no gallop and no friction rub.    No murmur heard.  Pulmonary/Chest: Effort normal and breath sounds normal. He has no wheezes. He has no rales.   Abdominal: Soft. Bowel sounds are normal. He exhibits no distension. There is no tenderness.   Musculoskeletal: He exhibits no edema.   Pain on palpation and ROM to left knee; Small left knee joint effusion noted   Neurological: He appears lethargic. He is disoriented.   Skin: No rash noted. No erythema.       Significant Labs:   CBC:   Recent Labs  Lab 07/03/17  1902 07/03/17  1954   WBC  --  11.93   HGB  --  14.3   HCT 45 41.6   PLT  --  249     CMP:   Recent Labs  Lab 07/03/17 2027 07/04/17  0410 07/05/17  0402    144 145   K 5.2* 4.3 4.2    110 113*   CO2 24 23 25   * 248* 256*   BUN 37* 41* 61*   CREATININE 1.5* 1.4 1.7*   CALCIUM 9.8 10.0 9.7   PROT 8.1  --   --    ALBUMIN 3.1*  --   --    BILITOT 1.2*  --   --    ALKPHOS 142*  --   --    AST 28  --   --    ALT 24  --   --    ANIONGAP 10 11 7*   EGFRNONAA 42.1* 45.8* 36.2*     POCT Glucose:   Recent Labs  Lab 07/05/17  0806 07/05/17  1152 07/05/17  1711   POCTGLUCOSE 197* 227* 381*       Significant Imaging: I have reviewed all pertinent imaging results/findings within the past 24 hours.    Assessment/Plan:      * Acute encephalopathy    Improved. Somnolence improving and felt likely due to delayed clearance of Tramadol in setting of advanced age and CKD.  Avoid use of tramadol in future and for now just Tylenol for pain. Continue to monitor with neuro checks; anticipate clearing of mental status in next 24 hours. No further neuro imaging needed at this time as clinically improving. No evidence to suggest metabolic cause as BUN only minimally elevated and sodium and calcium normal. Patient with no  fever, tachycardia or leukocytosis to suggest infectious cause. Continue to monitor.           Acute renal failure superimposed on stage 3 chronic kidney disease    Creatinine function not improving and increased from 1.4 on 7/4 to 1.7 today, 7/5. Patient with good urine output and Villa in place. Will start 0.9NS at 150 cc/hr due to concern for possible prerenal azotemia. Will obtain UA or urine lytes and urine creatinine to evaluate cause of DASIA. Avoid any nephrotoxic agents such as NSAIDS, IV contrast or aminoglycosides that could worsen renal function and renally adjust all medications. Monitor daily BMP. Consider renal ultrasound if renal function should not improve in next 24 hours to look for post-obstructive uropathy or vascular compromise to kidneys.           Acute gout of left knee    Improved. Likely due to reduced uric acid clearance in CKD. No dietary risk factors. Given advanced age and insulin-dependent diabetes cannot use NSAIDS to treat; Will instead treat gout flare with short course of Prednisone 40mg daily x 5 days and patient is responding. Start Allopurinol 100 mg po daily in 1-2 weeks once gout flare resolved for maintenance therapy to prevent future attacks and reduce risk of recurrence.            Type 2 diabetes mellitus with hyperglycemia, with long-term current use of insulin    Type 2 diabetes mellitus with diabetic polyneuropathy, with long-term current use of insulin  Type 2 diabetes mellitus with stage 3 chronic kidney disease, with long-term current use of insulin  Suboptimal control in the hospital and likely related to poor control prior to this hospital stay and addition of Prednisone to treat acute gout. HgA1C 9.8% and not at goal on 6/22. Plan is to increase Levemir from 68 to 74 units at bedtime and increase Novolog from 14 to 17 units with meals. Monitor POCT glucose 4 times a day with each meal and at bedtime and cover with Novolog low dose sliding scale insulin. 2000  calorie diabetic diet. Target pre-meal glucose goal is <140 with all random glucoses <180 in non-critically ill patient.         Late onset Alzheimer's disease without behavioral disturbance    Controlled. Patient likely more susceptible to effects of sedating medications.  Avoid tramadol in future.  Continue Namenda and Aricept to treat dementia.  Delirium precautions.          Essential hypertension    Patient's blood pressure is controlled here in the hospital over past 24 hours. Goal for blood pressure is SBP < 140 and DBP < 90 as patient > or = 60 years of age and patient has advanced chronic kidney disease based on JNC 8 guidelines. Continue current medications of Amlodipine and Toprol XL to treat. Hold Lisinopril for now in light of increasing Cr level. Plan to continue to monitor patient's blood pressure routinely while patient is hospitalized.           Hemiparesis affecting left side as late effect of cerebrovascular accident    Plan to resume PT/OT in the am as gout flare in left knee now improved. Speech therapy evaluated patient and recommending pureed diet with thin liquids. Patient will need SNF placement on discharge.          Chronic combined systolic and diastolic heart failure    Controlled. Patient without evidence of decompensation presently.  Plan to continue Metoprolol XL to treat. Hold Lisinopril in light of DASIA.                VTE Risk Mitigation         Ordered     enoxaparin injection 40 mg  Daily     Route:  Subcutaneous        07/04/17 0005     Medium Risk of VTE  Once      07/04/17 0005          Unique De Jesus MD  Department of Hospital Medicine   [facility]

## 2017-07-05 NOTE — PLAN OF CARE
Patient transferred to [facility] from [facility] for acute encephalopathy. Per MD- PT/OT to be ordered today to eval and treat. Patient lives with his spouse. Patient's spouse is present at the bedside and able to answer all of CM's questions. Patient has good family support. CM completed discharge assessment and planning with patient's spouse. Patient's spouse verbalized understanding. All questions and concerns addressed. SW and CM will continue to follow for any additional needs. Plan A to discharge to long term SNF as soon as medically stable. Plan B to discharge home with home health. Patient's spouse requested a long term SNF (PHN approved) list. CM updated JOEL Izquierdo on SNF list request. Patient's spouse stated that PHN is primary and VA insurance is secondary.    PCP: Eb Matthews MD    Pharmacy:  [facility] [address] [place] [address] [address] AT [facility] [place]  [id number]  [facility] [address]  Phone: [phone] Fax: [phone]    Payor: [employer] MEDICARE / Plan: [place] 65 / Product Type: Medicare Advantage /      07/05/17 1000   Discharge Assessment   Assessment Type Discharge Planning Assessment   Confirmed/corrected address and phone number on facesheet? Yes   Assessment information obtained from? Caregiver;Medical Record   Expected Length of Stay (days) 5   Communicated expected length of stay with patient/caregiver yes   Prior to hospitilization cognitive status: Alert/Oriented   Prior to hospitalization functional status: Assistive Equipment   Current cognitive status: Unable to Assess  (patient asleep)   Current Functional Status: Assistive Equipment   Arrived From skilled nursing facility  ([facility])   Lives With spouse   Able to Return to Prior Arrangements unable to determine at this time (comments)   Is patient able to care for self after discharge? Unable to determine at this time (comments)   How many people do you  have in your home that can help with your care after discharge? 1   Who are your caregiver(s) and their phone number(s)? spouse- Sirisha Perez [phone], [phone]   Patient's perception of discharge disposition skilled nursing facility   Readmission Within The Last 30 Days no previous admission in last 30 days   Patient currently being followed by outpatient case management? No   Patient currently receives home health services? No   Does the patient currently use HME? Yes   Patient currently receives private duty nursing? No   Patient currently receives any other outside agency services? No   Equipment Currently Used at Home cane, quad;cane, straight;bath bench;rollator;wheelchair;other (see comments)  (rollator and wheelchair are >5 years old; handheld shower head)   Do you have any problems affording any of your prescribed medications? No   Is the patient taking medications as prescribed? yes   Do you have any financial concerns preventing you from receiving the healthcare you need? No   Does the patient have transportation to healthcare appointments? Yes   Transportation Available family or friend will provide   On Dialysis? No   Does the patient receive services at the [facility]? No   Are there any open cases? No   Discharge Plan A Skilled Nursing Facility   Discharge Plan B Home Health;Home with family   Patient/Family In Agreement With Plan yes

## 2017-07-05 NOTE — ASSESSMENT & PLAN NOTE
Plan to resume PT/OT in the am as gout flare in left knee now improved. Speech therapy evaluated patient and recommending pureed diet with thin liquids. Patient will need SNF placement on discharge.

## 2017-07-05 NOTE — PLAN OF CARE
Problem: Patient Care Overview  Goal: Plan of Care Review  Outcome: Ongoing (interventions implemented as appropriate)  Patient AAOX3, VSS. Family at bedside. Two side rails up, bed locked, call light within reach. No falls noted as these precautions remain. Patient is free of skin breakdown as patient moves well independently/. Blood glucose monitoring ordered. Pain controlled well with PRN meds. Hourly rounds made and no complaints at this time noted. Will resume with plan of care.

## 2017-07-05 NOTE — TELEPHONE ENCOUNTER
----- Message from Merced Hurst sent at 7/3/2017  3:57 PM CDT -----  Contact: Self   Pt son would like a call back in regards to pt being [facility] in [facility] changes. Pt also lost consciousness. Pt son  Would like a call back as soon as possible         Pt son can be contacted at [phone]

## 2017-07-05 NOTE — ASSESSMENT & PLAN NOTE
Creatinine function not improving and increased from 1.4 on 7/4 to 1.7 today, 7/5. Patient with good urine output and Villa in place. Will start 0.9NS at 150 cc/hr due to concern for possible prerenal azotemia. Will obtain UA or urine lytes and urine creatinine to evaluate cause of DASIA. Avoid any nephrotoxic agents such as NSAIDS, IV contrast or aminoglycosides that could worsen renal function and renally adjust all medications. Monitor daily BMP. Consider renal ultrasound if renal function should not improve in next 24 hours to look for post-obstructive uropathy or vascular compromise to kidneys.

## 2017-07-05 NOTE — PLAN OF CARE
Problem: SLP Goal  Goal: SLP Goal  Speech Therapy Short Term Goals  Goals expected to be met by  7/11  1. Pt will tolerate puree diet and thin liquids with no overt s/s of aspiration noted.   2. Pt will tolerate dental soft trials x10 for possible diet upgrade with adequate oral clearance and no overt s/s of aspiration.      Outcome: Ongoing (interventions implemented as appropriate)  Pt w/ improved level of arousal this tx session.  REC:  Advance pt diet to Dental Soft w/ thin liquids, no straws, oral meds crushed in puree, 1:1 feeding assistance for all meals. Cont ST per POC.  Will review recs w/ team.  Cont ST per POC.    Enma Connor CCC-SLP  7/5/2017

## 2017-07-05 NOTE — PLAN OF CARE
received consult to arrange skilled nursing.  Met with pts wife in room to provide her with a list of [facility] approved skilled nursing homes.  Wife will review list and select a nursing facility.   will complete  locet.

## 2017-07-05 NOTE — SUBJECTIVE & OBJECTIVE
Interval History: Patient more alert today and able to respond to questioning. Patient with decreased pain to left knee today.     Review of Systems   Constitutional: Negative for chills and fever.   Respiratory: Negative for cough.    Cardiovascular: Negative for chest pain and leg swelling.   Gastrointestinal: Negative for nausea and vomiting.   Genitourinary: Negative for flank pain.   Musculoskeletal: Positive for myalgias (Left knee pain).   Skin: Negative for rash.   Neurological: Negative for tremors and light-headedness.   Psychiatric/Behavioral: Positive for confusion (Mild).     Objective:     Vital Signs (Most Recent):  Temp: 96.9 °F (36.1 °C) (07/05/17 1605)  Pulse: 86 (07/05/17 1605)  Resp: 18 (07/05/17 1605)  BP: (!) 146/71 (07/05/17 1605)  SpO2: (!) 92 % (07/05/17 1605) Vital Signs (24h Range):  Temp:  [95.9 °F (35.5 °C)-98.6 °F (37 °C)] 96.9 °F (36.1 °C)  Pulse:  [] 86  Resp:  [18] 18  SpO2:  [92 %-97 %] 92 %  BP: (121-160)/(62-81) 146/71     Weight: 85.3 kg (188 lb)  Body mass index is 27.76 kg/m².    Intake/Output Summary (Last 24 hours) at 07/05/17 1812  Last data filed at 07/05/17 1600   Gross per 24 hour   Intake                0 ml   Output             1575 ml   Net            -1575 ml      Physical Exam   Constitutional: He appears well-developed and well-nourished. He appears lethargic. No distress.   HENT:   Mouth/Throat: Oropharynx is clear and moist.   Eyes: Conjunctivae are normal.   Neck: No JVD present.   Cardiovascular: Normal rate, regular rhythm and normal heart sounds.  Exam reveals no gallop and no friction rub.    No murmur heard.  Pulmonary/Chest: Effort normal and breath sounds normal. He has no wheezes. He has no rales.   Abdominal: Soft. Bowel sounds are normal. He exhibits no distension. There is no tenderness.   Musculoskeletal: He exhibits no edema.   Pain on palpation and ROM to left knee; Small left knee joint effusion noted   Neurological: He appears lethargic. He  is disoriented.   Skin: No rash noted. No erythema.       Significant Labs:   CBC:   Recent Labs  Lab 07/03/17  1902 07/03/17  1954   WBC  --  11.93   HGB  --  14.3   HCT 45 41.6   PLT  --  249     CMP:   Recent Labs  Lab 07/03/17  2027 07/04/17  0410 07/05/17  0402    144 145   K 5.2* 4.3 4.2    110 113*   CO2 24 23 25   * 248* 256*   BUN 37* 41* 61*   CREATININE 1.5* 1.4 1.7*   CALCIUM 9.8 10.0 9.7   PROT 8.1  --   --    ALBUMIN 3.1*  --   --    BILITOT 1.2*  --   --    ALKPHOS 142*  --   --    AST 28  --   --    ALT 24  --   --    ANIONGAP 10 11 7*   EGFRNONAA 42.1* 45.8* 36.2*     POCT Glucose:   Recent Labs  Lab 07/05/17  0806 07/05/17  1152 07/05/17  1711   POCTGLUCOSE 197* 227* 381*       Significant Imaging: I have reviewed all pertinent imaging results/findings within the past 24 hours.

## 2017-07-05 NOTE — PT/OT/SLP PROGRESS
"Speech Language Pathology  Treatment    Emmett Perez   MRN: [id number]   Admitting Diagnosis: Acute encephalopathy    Diet recommendations: Solid Diet Level: Dental Soft  Liquid Diet Level: Thin Feed only when awake/alert, No straws, HOB to 90 degrees, Small bites/sips, Alternating bites/sips, 1 bite/sip at a time, Check for pocketing/oral residue, Meds crushed in puree and Assistance with meals    SLP Treatment Date: 07/05/17  Speech Start Time: 1311     Speech Stop Time: 1330     Speech Total (min): 19 min       TREATMENT BILLABLE MINUTES:  Treatment Swallowing Dysfunction 10 and Seld Care/Home Management Training 9    Has the patient been evaluated by SLP for swallowing? : Yes  Keep patient NPO?: No   General Precautions: Standard, aspiration, fall, pureed diet, seizure          Subjective:  "Okay, lets try it."  Pt stated when offered po trials    Pain/Comfort  Pain Rating 1: 0/10  Pain Rating Post-Intervention 1: 0/10    Objective:   Patient found with: telemetry, peripheral IV    Pt was awake and alert upon SLP presentation.  He was seated upright for optimal swallowing safety.  He was offered and accepted po trials of thin liquids by tsp, and cup edge and self-fed bites of soft solids.  He demonstrated good oral bolus formation and manipulation.  Mild stasis of soft solids was seen coating anterior tongue body which pt cleared w/ liquid wash.  Swallow reflex initiation was timely w/ no overt s/s of airway compromise was seen w/ any consistency trialed.      Education was provided to pt and spouse re: diet advancement recommendations, aspiration precautions listed above, s/s of aspiration and risk of aspiration.  Pt's wife indicated good understanding and agreed w/ established poc.    FIM:                                 Assessment:  Emmett Perez is a 84 y.o. male with a medical diagnosis of Acute encephalopathy and presents with dysphagia.    Discharge recommendations: Discharge Facility/Level Of Care Needs: " nursing facility, skilled     Goals:    SLP Goals        Problem: SLP Goal    Goal Priority Disciplines Outcome   SLP Goal     SLP Ongoing (interventions implemented as appropriate)   Description:  Speech Therapy Short Term Goals  Goals expected to be met by  7/11  1. Pt will tolerate puree diet and thin liquids with no overt s/s of aspiration noted.   2. Pt will tolerate dental soft trials x10 for possible diet upgrade with adequate oral clearance and no overt s/s of aspiration.                        Plan:   Patient to be seen Therapy Frequency: 5 x/week   Plan of Care expires: 08/02/17  Plan of Care reviewed with: patient, spouse  SLP Follow-up?: Yes              Enma Connor CCC-SLP  07/05/2017

## 2017-07-05 NOTE — TELEPHONE ENCOUNTER
Call returned/Spoke w/Son Garfield Perez-    He states that his dad is currently doing ok, he had a reaction to Tramadol and was admitted to the ED. He does plan to keep his f/u appt w/Dr. Barragan 07/25/2017.

## 2017-07-05 NOTE — ASSESSMENT & PLAN NOTE
Improved. Likely due to reduced uric acid clearance in CKD. No dietary risk factors. Given advanced age and insulin-dependent diabetes cannot use NSAIDS to treat; Will instead treat gout flare with short course of Prednisone 40mg daily x 5 days and patient is responding. Start Allopurinol 100 mg po daily in 1-2 weeks once gout flare resolved for maintenance therapy to prevent future attacks and reduce risk of recurrence.

## 2017-07-05 NOTE — ASSESSMENT & PLAN NOTE
Patient's blood pressure is controlled here in the hospital over past 24 hours. Goal for blood pressure is SBP < 140 and DBP < 90 as patient > or = 60 years of age and patient has advanced chronic kidney disease based on JNC 8 guidelines. Continue current medications of Amlodipine and Toprol XL to treat. Hold Lisinopril for now in light of increasing Cr level. Plan to continue to monitor patient's blood pressure routinely while patient is hospitalized.

## 2017-07-05 NOTE — ASSESSMENT & PLAN NOTE
Improved. Somnolence improving and felt likely due to delayed clearance of Tramadol in setting of advanced age and CKD.  Avoid use of tramadol in future and for now just Tylenol for pain. Continue to monitor with neuro checks; anticipate clearing of mental status in next 24 hours. No further neuro imaging needed at this time as clinically improving. No evidence to suggest metabolic cause as BUN only minimally elevated and sodium and calcium normal. Patient with no fever, tachycardia or leukocytosis to suggest infectious cause. Continue to monitor.

## 2017-07-06 LAB
ANION GAP SERPL CALC-SCNC: 8 MMOL/L
BACTERIA SPEC AEROBE CULT: NO GROWTH
BASOPHILS # BLD AUTO: 0 K/UL
BASOPHILS NFR BLD: 0 %
BUN SERPL-MCNC: 53 MG/DL
CALCIUM SERPL-MCNC: 9 MG/DL
CHLORIDE SERPL-SCNC: 114 MMOL/L
CO2 SERPL-SCNC: 23 MMOL/L
CREAT SERPL-MCNC: 1.3 MG/DL
DIFFERENTIAL METHOD: ABNORMAL
EOSINOPHIL # BLD AUTO: 0 K/UL
EOSINOPHIL NFR BLD: 0 %
ERYTHROCYTE [DISTWIDTH] IN BLOOD BY AUTOMATED COUNT: 12.9 %
EST. GFR  (AFRICAN AMERICAN): 57.9 ML/MIN/1.73 M^2
EST. GFR  (NON AFRICAN AMERICAN): 50.1 ML/MIN/1.73 M^2
GLUCOSE SERPL-MCNC: 162 MG/DL
HCT VFR BLD AUTO: 34.7 %
HGB BLD-MCNC: 11.5 G/DL
LYMPHOCYTES # BLD AUTO: 1.6 K/UL
LYMPHOCYTES NFR BLD: 11 %
MCH RBC QN AUTO: 27.1 PG
MCHC RBC AUTO-ENTMCNC: 33.1 %
MCV RBC AUTO: 82 FL
MONOCYTES # BLD AUTO: 0.6 K/UL
MONOCYTES NFR BLD: 3.8 %
NEUTROPHILS # BLD AUTO: 12.2 K/UL
NEUTROPHILS NFR BLD: 84.9 %
PLATELET # BLD AUTO: 255 K/UL
PMV BLD AUTO: 11 FL
POCT GLUCOSE: 109 MG/DL (ref 70–110)
POCT GLUCOSE: 170 MG/DL (ref 70–110)
POCT GLUCOSE: 255 MG/DL (ref 70–110)
POCT GLUCOSE: 71 MG/DL (ref 70–110)
POTASSIUM SERPL-SCNC: 3.8 MMOL/L
RBC # BLD AUTO: 4.25 M/UL
SODIUM SERPL-SCNC: 145 MMOL/L
WBC # BLD AUTO: 14.37 K/UL

## 2017-07-06 PROCEDURE — 99232 SBSQ HOSP IP/OBS MODERATE 35: CPT | Mod: ,,, | Performed by: INTERNAL MEDICINE

## 2017-07-06 PROCEDURE — 97535 SELF CARE MNGMENT TRAINING: CPT

## 2017-07-06 PROCEDURE — 97161 PT EVAL LOW COMPLEX 20 MIN: CPT

## 2017-07-06 PROCEDURE — 97530 THERAPEUTIC ACTIVITIES: CPT

## 2017-07-06 PROCEDURE — 36415 COLL VENOUS BLD VENIPUNCTURE: CPT

## 2017-07-06 PROCEDURE — 25000003 PHARM REV CODE 250: Performed by: HOSPITALIST

## 2017-07-06 PROCEDURE — G8978 MOBILITY CURRENT STATUS: HCPCS | Mod: CL

## 2017-07-06 PROCEDURE — 97112 NEUROMUSCULAR REEDUCATION: CPT

## 2017-07-06 PROCEDURE — 85025 COMPLETE CBC W/AUTO DIFF WBC: CPT

## 2017-07-06 PROCEDURE — G8979 MOBILITY GOAL STATUS: HCPCS | Mod: CK

## 2017-07-06 PROCEDURE — 63600175 PHARM REV CODE 636 W HCPCS: Performed by: HOSPITALIST

## 2017-07-06 PROCEDURE — 11000001 HC ACUTE MED/SURG PRIVATE ROOM

## 2017-07-06 PROCEDURE — 80048 BASIC METABOLIC PNL TOTAL CA: CPT

## 2017-07-06 PROCEDURE — 97166 OT EVAL MOD COMPLEX 45 MIN: CPT

## 2017-07-06 RX ORDER — INSULIN ASPART 100 [IU]/ML
15 INJECTION, SOLUTION INTRAVENOUS; SUBCUTANEOUS
Status: DISCONTINUED | OUTPATIENT
Start: 2017-07-06 | End: 2017-07-07

## 2017-07-06 RX ORDER — HYDRALAZINE HYDROCHLORIDE 25 MG/1
25 TABLET, FILM COATED ORAL EVERY 6 HOURS PRN
Status: DISCONTINUED | OUTPATIENT
Start: 2017-07-06 | End: 2017-07-09 | Stop reason: HOSPADM

## 2017-07-06 RX ADMIN — MEMANTINE HYDROCHLORIDE 10 MG: 10 TABLET ORAL at 08:07

## 2017-07-06 RX ADMIN — MEMANTINE HYDROCHLORIDE 10 MG: 10 TABLET ORAL at 10:07

## 2017-07-06 RX ADMIN — ENOXAPARIN SODIUM 40 MG: 100 INJECTION SUBCUTANEOUS at 06:07

## 2017-07-06 RX ADMIN — INSULIN ASPART 3 UNITS: 100 INJECTION, SOLUTION INTRAVENOUS; SUBCUTANEOUS at 06:07

## 2017-07-06 RX ADMIN — ASPIRIN 81 MG CHEWABLE TABLET 81 MG: 81 TABLET CHEWABLE at 08:07

## 2017-07-06 RX ADMIN — METOPROLOL SUCCINATE 25 MG: 25 TABLET, EXTENDED RELEASE ORAL at 08:07

## 2017-07-06 RX ADMIN — INSULIN ASPART 15 UNITS: 100 INJECTION, SOLUTION INTRAVENOUS; SUBCUTANEOUS at 06:07

## 2017-07-06 RX ADMIN — AMLODIPINE BESYLATE 10 MG: 10 TABLET ORAL at 08:07

## 2017-07-06 RX ADMIN — ATORVASTATIN CALCIUM 40 MG: 20 TABLET, FILM COATED ORAL at 08:07

## 2017-07-06 RX ADMIN — PREDNISONE 40 MG: 20 TABLET ORAL at 08:07

## 2017-07-06 RX ADMIN — DONEPEZIL HYDROCHLORIDE 10 MG: 10 TABLET, FILM COATED ORAL at 08:07

## 2017-07-06 NOTE — ASSESSMENT & PLAN NOTE
Type 2 diabetes mellitus with diabetic polyneuropathy, with long-term current use of insulin  Type 2 diabetes mellitus with stage 3 chronic kidney disease, with long-term current use of insulin  Much improved after adjusting insulin dosing over past several days. Hyperglycemia related to poor control prior to this hospital stay and addition of Prednisone to treat acute gout. HgA1C 9.8% and not at goal on 6/22. Plan is to continue Levemir at 72 units at bedtime and Novolog from 15 units with meals. Monitor POCT glucose 4 times a day with each meal and at bedtime and cover with Novolog low dose sliding scale insulin. 2000 calorie diabetic diet. Target pre-meal glucose goal is <140 with all random glucoses <180 in non-critically ill patient.

## 2017-07-06 NOTE — PLAN OF CARE
07/06/17 1407   Readmission Questionnaire   At the time of your discharge, did someone talk to you about what your health problems were? Yes   At the time of discharge, did someone talk to you about what to watch out for regarding worsening of your health problem? Yes   At the time of discharge, did someone talk to you about what to do if you experienced worsening of your health problem? Yes   At the time of discharge, did someone talk to you about which medication to take when you left the hospital and which ones to stop taking? Yes   At the time of discharge, did someone talk to you about when and where to follow up with a doctor after you left the hospital? Yes   What do you believe caused you to be sick enough to be re-admitted? reaction to medication   How often do you need to have someone help you when you read instructions, pamphlets, or other written material from your doctor or pharmacy? Sometimes   Do you have problems taking your medications as prescribed? No   Do you have any problems affording any of  your prescribed medications? No   Do you have problems obtaining/receiving your medications? No   Does the patient have transportation to healthcare appointments? Yes   Lives With spouse   Living Arrangements house   Does the patient have family/friends to help with healtcare needs after discharge? yes   Who are your caregiver(s) and their phone number(s)? Sirisha Perez Spouse [phone], [phone]    Does your caregiver provide all the help you need? No   If no, what kind of help do you need at home? rehab   Are you currently feeling confused? No   Are you currently having problems thinking? No   Are you currently having memory problems? No   Have you felt down, depressed, or hopeless? 0   Have you felt little interest or pleasure in doing things? 0   In the last 7 days, my sleep quality was: fair

## 2017-07-06 NOTE — PLAN OF CARE
Problem: Occupational Therapy Goal  Goal: Occupational Therapy Goal  Goals to be met by: 7/20/17     Patient will increase functional independence with ADLs by performing:    Feeding with Minimal Assistance.  UE Dressing with Minimal Assistance.  Grooming while EOB with Minimal Assistance.  Sitting at edge of bed x2 minutes with Contact Guard Assistance.  Rolling to Right, Left with Minimal Assistance.   Supine to sit with Minimal Assistance.  Toilet transfer to bedside commode with Moderate Assistance.  Upper extremity AAROM/PROM exercise program x10 reps per handout and visual demonstration, with assistance as needed.    Outcome: Ongoing (interventions implemented as appropriate)  Initial eval completed   POC implemented, goals set

## 2017-07-06 NOTE — PT/OT/SLP EVAL
Physical Therapy  Evaluation    Emmett Perez   MRN: [id number]   Admitting Diagnosis: Acute encephalopathy    PT Received On: 07/06/17  PT Start Time: 0928     PT Stop Time: 1022    PT Total Time (min): 54 min       Billable Minutes:  Evaluation 24 and Therapeutic Activity 30    Diagnosis: Acute encephalopathy      Past Medical History:   Diagnosis Date    Cardiomyopathy     Cerebral microvascular disease 6/22/2017    Chronic combined systolic and diastolic heart failure 6/22/2017    CKD (chronic kidney disease) stage 3, GFR 30-59 ml/min     Convulsions 11/28/2016    Essential hypertension 6/22/2017    Hemiparesis affecting left side as late effect of cerebrovascular accident 6/22/2017    Late onset Alzheimer's disease without behavioral disturbance 11/28/2016    Monoclonal paraproteinemia 7/30/2013    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 6/22/2017    Type 2 diabetes mellitus with diabetic polyneuropathy, with long-term current use of insulin 11/28/2016    Type 2 diabetes mellitus with stage 3 chronic kidney disease, with long-term current use of insulin 11/29/2016      Past Surgical History:   Procedure Laterality Date    CERVICAL SPINE SURGERY  2007    CORONARY ANGIOPLASTY WITH STENT PLACEMENT      thyroid nodule  1997       Referring physician: Unique De Jesus MD  Date referred to PT: 7/5/17    General Precautions: Standard, aspiration, fall, seizure  Orthopedic Precautions: N/A   Braces: N/A       Do you have any cultural, spiritual, Evangelical conflicts, given your current situation?: none    Patient History:  Lives With: spouse  Living Arrangements: house  Home Accessibility: stairs to enter home  Home Layout: Able to live on 1st floor  Number of Stairs to Enter Home:  (1 threshold step)  Stair Railings at Home: none  Transportation Available: family or friend will provide  Living Environment Comment: Pt's wife works 4-12 (per chart), pt is alone at times, has adult son  that can assist during the night, Pt previously used SCP and QC within home and used rollator or wheelchair for outdoor or community ambulation.   Equipment Currently Used at Home: cane, quad, cane, straight, bath bench, wheelchair, rollator  DME owned (not currently used): none    Previous Level of Function:  Ambulation Skills: needs device  Transfer Skills: needs device  ADL Skills: needs device and assist  Work/Leisure Activity: needs device    Subjective:  Communicated with pt and nurse prior to session.  Pt agreeable to therapy  Chief Complaint: pain in L knee and decreased mobility  Patient goals: to improve mobility and independence    Pain/Comfort  Pain Rating 1: 9/10  Location - Side 1: Left  Location 1: knee  Pain Addressed 1: Reposition, Distraction      Objective:   Patient found with: peripheral IV, SCD     Cognitive Exam:  Oriented to: Person, Place, and Situation    Follows Commands/attention: Follows one-step commands  Communication: clear/fluent  Safety awareness/insight to disability: impaired    Physical Exam:  Postural examination/scapula alignment: Rounded shoulder, Head forward and Posterior pelvic tilt    Skin integrity: Visible skin intact  Edema: Mild LUE    Sensation:   Intact    Upper Extremity Range of Motion:  Right Upper Extremity: WFL  Left Upper Extremity: WFL    Upper Extremity Strength:  Right Upper Extremity: WFL  Left Upper Extremity: Deficits: gross UE weakness    Lower Extremity Range of Motion:  Right Lower Extremity: WFL  Left Lower Extremity: Deficits: knee flexion contracture ~10 deg    Lower Extremity Strength:  Right Lower Extremity: 3/5 hip flexion, 4/5 knee extension and ankle DF  Left Lower Extremity: 2/5 gross LE strength     Fine motor coordination:  Impaired  Left hand thumb/finger opposition skills impaired    Gross motor coordination: Impaired LLE and LUE    Functional Mobility:  Bed Mobility:  Rolling/Turning to Left: Total assistance (max x2)  Scooting/Bridging:  Total Assistance (max x2)  Supine to Sit: Total Assistance (max x2)  Sit to Supine: Total Assistance (max x2)    Transfers:  Sit <> Stand Assistance: Total Assistance (max x2)  Sit <> Stand Assistive Device: Rolling Walker      Balance:   Static Sit: POOR: Needs MODERATE assist to maintain  Dynamic Sit: POOR: N/A  Static Stand: 0: Needs MAXIMAL assist to maintain   Dynamic stand: 0: N/A    Therapeutic Activities and Exercises:  Pt is able to perform bed mobility rolling to the R, bridging and scooting, and supine <> sit with total assist (max assist x2) with skilled verbal and tactile instruction for proper technique and body mechanics, was able to sit EOB approximately 15-20 min with bilateral UE support and max assist to maintain balance at midline and prevent L lateral lean with skilled verbal and tactile instruction to achieve and maintain full upright posture and attempt to self correct LOB, and stand x2 trials with total assist (max x2) with RW with skilled verbal and tactile instruction to achieve and maintain full upright posture, equalize weight bearing on LLE and improve trunk, hip, and knee extension. PT provided gentle stretching of L hamstrings and positioned pt with rolled blanket under L lower leg to encourage L knee extension with instruction to pt and wife to keep LLE extended while lying in bed.     AM-PAC 6 CLICK MOBILITY  How much help from another person does this patient currently need?   1 = Unable, Total/Dependent Assistance  2 = A lot, Maximum/Moderate Assistance  3 = A little, Minimum/Contact Guard/Supervision  4 = None, Modified [place]/Independent    Turning over in bed (including adjusting bedclothes, sheets and blankets)?: 2  Sitting down on and standing up from a chair with arms (e.g., wheelchair, bedside commode, etc.): 2  Moving from lying on back to sitting on the side of the bed?: 2  Moving to and from a bed to a chair (including a wheelchair)?: 2  Need to walk in hospital  room?: 1  Climbing 3-5 steps with a railing?: 1  Total Score: 10     AM-PAC Raw Score CMS G-Code Modifier Level of Impairment Assistance   6 % Total / Unable   7 - 9 CM 80 - 100% Maximal Assist   10 - 14 CL 60 - 80% Moderate Assist   15 - 19 CK 40 - 60% Moderate Assist   20 - 22 CJ 20 - 40% Minimal Assist   23 CI 1-20% SBA / CGA   24 CH 0% Independent/ Mod I     Patient left supine with wedge under L side with rolled blanket under LLE to encourage L knee extension, and with SCDs with all lines intact, call button in reach and wife present.    Assessment:   Emmett Perez is a 84 y.o. male with a medical diagnosis of Acute encephalopathy and presents with contributing diagnosis of gout in L knee and L hemiparesis that may impact POC. The patient demonstrates weakness, impaired endurance and balance, limited L knee extension ROM, and pain in L knee requiring increased assistance for bed mobility and transfers, and is unable to ambulate at this time. Pt demonstrated increased fatigue and lethargy requiring instruction to remain alert at times. The patient presents with a stable medical condition and tolerated treatment well classifying this evaluation at low complexity.. Pt will benefit from skilled PT treatment to address impairments and improve functional mobility and independence in order to return home.     Education:  Pt and spouse educated on proper technique for bed mobility and transfers and positioning of LLE to encourage L knee extension to prepare for ambulation.  Pt able to transfer with therapist only at this time.     Rehab identified problem list/impairments: Rehab identified problem list/impairments: weakness, impaired endurance, impaired sensation, impaired self care skills, impaired functional mobilty, gait instability, impaired balance, decreased coordination, decreased lower extremity function, decreased upper extremity function, pain, decreased safety awareness, decreased ROM, edema,  impaired muscle length    Rehab potential is good.    Activity tolerance: Good    Discharge recommendations: Discharge Facility/Level Of Care Needs: nursing facility, skilled     Barriers to discharge: Barriers to Discharge: Decreased caregiver support    Equipment recommendations: Equipment Needed After Discharge: none     GOALS:    Physical Therapy Goals        Problem: Physical Therapy Goal    Goal Priority Disciplines Outcome Goal Variances Interventions   Physical Therapy Goal     PT/OT, PT Ongoing (interventions implemented as appropriate)     Description:  Goals to be met by: 17     Patient will increase functional independence with mobility by performin. Supine to sit with MInimal Assistance  2. Sit to supine with MInimal Assistance  3. Rolling to Left and Right with Contact Guard Assistance.  4. Sit to stand transfer with Moderate Assistance using RW  5. Bed to chair transfer with Moderate Assistance using Rolling Walker  6. Gait  x 20 feet with Moderate Assistance using Rolling Walker.   7. Sitting at edge of bed x10 minutes with Supervision  8. Lower extremity exercise program x20 reps per handout, with assistance as needed                      PLAN:    Patient to be seen 5 x/week to address the above listed problems via gait training, therapeutic activities, therapeutic exercises, neuromuscular re-education  Plan of Care expires: 17  Plan of Care reviewed with: patient, spouse          Libra MILAGRO Sanders, PT  2017

## 2017-07-06 NOTE — PLAN OF CARE
Problem: Patient Care Overview  Goal: Plan of Care Review  Outcome: Ongoing (interventions implemented as appropriate)  Pt AAOx4, calm, vs stable, No Falls noted as fall precautions are active, No complains of Pain, No deterioration of mental status, Blood sugar monitoring active, no signs or symptoms of skin breakdown noted, Family at bedside, No complaints at this time, will continue to monitor

## 2017-07-06 NOTE — PT/OT/SLP EVAL
Occupational Therapy  Evaluation/Treatment     Emmett Perez   MRN: [id number]   Admitting Diagnosis: Acute encephalopathy    OT Date of Treatment: 07/06/17   OT Start Time: 0928  OT Stop Time: 1022  OT Total Time (min): 54 min ( co-eval with PT)     Billable Minutes:  Evaluation 30  Therapeutic Activity 24    Diagnosis: Acute encephalopathy       Past Medical History:   Diagnosis Date    Cardiomyopathy     Cerebral microvascular disease 6/22/2017    Chronic combined systolic and diastolic heart failure 6/22/2017    CKD (chronic kidney disease) stage 3, GFR 30-59 ml/min     Convulsions 11/28/2016    Essential hypertension 6/22/2017    Hemiparesis affecting left side as late effect of cerebrovascular accident 6/22/2017    Late onset Alzheimer's disease without behavioral disturbance 11/28/2016    Monoclonal paraproteinemia 7/30/2013    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 6/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    Type 2 diabetes mellitus with diabetic polyneuropathy, with long-term current use of insulin 11/28/2016    Type 2 diabetes mellitus with stage 3 chronic kidney disease, with long-term current use of insulin 11/29/2016      Past Surgical History:   Procedure Laterality Date    CERVICAL SPINE SURGERY  2007    CORONARY ANGIOPLASTY WITH STENT PLACEMENT      thyroid nodule  1997       Referring physician: Unique De Jesus MD  Date referred to OT: 7/5/17    General Precautions: Standard, fall, seizure  Orthopedic Precautions: N/A  Braces: N/A    Do you have any cultural, spiritual, Roman Catholic conflicts, given your current situation?: None      Patient History:  Living Environment  Lives With: spouse  Living Arrangements: house  Home Accessibility:  (threshold)  Home Layout: Able to live on 1st floor  Stair Railings at Home: none  Transportation Available: family or friend will provide  Living Environment Comment: Pt reports he lives with his wife in a 1 STH, 1 threshold to enter. PTA, pt was (I) with UB/LB  "dressing, toileting, and required (A) for bathing. Pt has a tub/shower combo with a tub transfer bench. Pt required mod (I) for ambulation using a straight cane, quad cane, and rollator for short distances. Pt uses a w/c for longer distances outside of the home for mobility. Pt has a grab rail in shower and has a standard toilet.   Equipment Currently Used at Home: rollator, cane, quad, cane, straight, bath bench, wheelchair    Prior level of function:   Bed Mobility/Transfers: independent  Grooming: independent  Bathing: needs device and assist  Upper Body Dressing: independent  Lower Body Dressing: independent  Toileting: independent  Home Management Skills:  (Dependent )  Driving License: No  Mode of Transportation: Family  Leisure and Hobbies: chess, swimming, running      Dominant hand: right    Subjective:  Communicated with RN prior to session.  " I don't get it, my function is going backwards, I'm supposed to be getting better"   Chief Complaint: Pain in L knee, pt frustrated that he has been set back with therapy   Patient/Family stated goals: to get back to PLOF, to start progressing with rehab and get back function     Pain/Comfort  Pain Rating 1: 0/10  Location - Side 1: Left  Location 1: knee  Pain Addressed 1: Reposition, Distraction  Pain Rating Post-Intervention 1: 9/10    Objective:  Patient found with: peripheral IV, SCD   Pt found supine in bed with eyes closed and wife present. Pt required verbal and tactile cues to answer therapist's questions. With encouragement from therapist, pt was willing to participate in therapy session.     Cognitive Exam:  Oriented to: Person, Place and Situation  Follows Commands/attention: Follows one-step commands  Communication: clear/fluent  Memory:  No Deficits noted  Safety awareness/insight to disability: impaired   Coping skills/emotional control: Appropriate to situation    Visual/perceptual:  Impaired  L peripheral vision     Physical Exam:  Postural " examination/scapula alignment: Rounded shoulder, Head forward and Posterior pelvic tilt  Skin integrity: Visible skin intact  Edema: Mild LUE    Sensation:   Impaired  light/touch L UE    Upper Extremity Range of Motion:  Right Upper Extremity: WFL for pt to perform self-care grooming task at face level - shld flex, elbow flex/ext, abduction, and ext rotation.   Limited ROM of shld ext, internal rotation, adduction.     Left Upper Extremity: Deficits: shld flex/ext, elbow flex/ext, wrist flex/ext, abd/adduction, internal/external rotation  Pt has minimal active movement of shld flexion (about 20*), finger flex/ext, elbow flex    Upper Extremity Strength:  Right Upper Extremity: grossly 3/5   Left Upper Extremity: grossly 2-/5    Strength: R hand grossly 4-/5, L hand grossly 2-/5     Fine motor coordination:   Intact  Right hand thumb/finger opposition skills, Impaired left hand thumb/finger opposition skills    Gross motor coordination: Impaired L UE/LE    Functional Mobility:  Bed Mobility:  Rolling/Turning to Left: Maximum assistance, With side rail  Supine to Sit: Total Assistance, With leg lift (Max A x 2 for UB/ LB management )  Sit to Supine: Total Assistance, With leg lift Max x 2     Transfers:  Sit <> Stand Assistance: Total Assistance (at EOB, static stand - max A x 2 for support and to stand erect  )  Sit <> Stand Assistive Device: Rolling Walker  Bed <> Chair Transfer Assistance: Activity did not occur  Toilet Transfer Assistance: Activity Did not Occur    Activities of Daily Living:    Feeding Level of Assistance: Activity did not occur. Pt stated he requires assistance to cut up food.   UE Dressing Level of Assistance: Total assistance (to don hospital gown like jacket sitting EOB, pt required assistance for trunk support, lateral lean to L)    LE Dressing Level of Assistance: Total assistance (Pt unable to don/doff B  socks and required total A )    Grooming Position:  (Sitting supported with  HOB elevated )  Grooming Level of Assistance: Minimum assistance (Pt washed face, wife assisted minimally  )     Toileting Level of Assistance: Activity did not occur      Balance:   Static Sit: POOR: Needs MODERATE assist to maintain  Dynamic Sit: POOR: N/A  Static Stand: 0: Needs MAXIMAL assist to maintain   Dynamic stand: 0: N/A    Therapeutic Activities and Exercises:  Pt and wife educated on:   - role of OT, POC  - Importance of performing functional reaching tasks and functional mobility  - Pt's wife educated on LUE positioning   - Pt wife educated on assisting pt to reposition pt back to midline by providing verbal and tactile cueing     Pt sat EOB approximately 15-20 minutes with max A to sit erect with external support provided by therapist from behind. Pt required verbal and tactile cues to maintain balance at midline and constant verbal cues to self correct. Therapist provided assistance to position pt's L hand next to pt's hip to maintain bilateral UE support and prevent lateral L lean.  Pt was unable to perform functional reaching tasks with R hand due to inability to maintain full erect posture unsupported and impaired functional strength in L UE. Pt was able to sit at EOB unsupported with B UEs at side for ~32 seconds demonstrating poor static sitting balance. Pt demonstrated inconstant static sitting balance throughout session. OT provided gentle PROM to L UE to maintain L UE ROM and prevent contractures.     AM-PAC 6 CLICK ADL  How much help from another person does this patient currently need?  1 = Unable, Total/Dependent Assistance  2 = A lot, Maximum/Moderate Assistance  3 = A little, Minimum/Contact Guard/Supervision  4 = None, Modified [place]/Independent    Putting on and taking off regular lower body clothing? : 1  Bathing (including washing, rinsing, drying)?: 1  Toileting, which includes using toilet, bedpan, or urinal? : 1  Putting on and taking off regular upper body clothing?:  "1  Taking care of personal grooming such as brushing teeth?: 2  Eating meals?: 2  Total Score: 8    AM-PAC Raw Score CMS "G-Code Modifier Level of Impairment Assistance   6 % Total / Unable   7 - 9 CM 80 - 100% Maximal Assist   10-14 CL 60 - 80% Moderate Assist   15 - 19 CK 40 - 60% Moderate Assist   20 - 22 CJ 20 - 40% Minimal Assist   23 CI 1-20% SBA / CGA   24 CH 0% Independent/ Mod I       Patient left HOB elevated with all lines intact, call button in reach and wife present    Assessment:  Emmett Perez is a 84 y.o. male with a medical diagnosis of Acute encephalopathy. Pt presents with impairments in functional mobility and functional tasks due to L hemiparesis of the L UE/LE.  Pt required total assistance from x2 therapist to perform bed mobility and transfers this date. Pt would continue to benefit from skilled OT to further maximize safety and (I) with self-care tasks and functional mobility.    Rehab identified problem list/impairments: Rehab identified problem list/impairments: weakness, impaired endurance, impaired sensation, impaired self care skills, impaired functional mobilty, gait instability, impaired balance, impaired cognition, decreased upper extremity function, decreased lower extremity function, pain, decreased ROM, decreased safety awareness, decreased coordination, impaired fine motor    Rehab potential is good.    Activity tolerance: Fair    Discharge recommendations: Discharge Facility/Level Of Care Needs: nursing facility, skilled     Barriers to discharge: Barriers to Discharge: Decreased caregiver support    Equipment recommendations: none     GOALS:    Occupational Therapy Goals        Problem: Occupational Therapy Goal    Goal Priority Disciplines Outcome Interventions   Occupational Therapy Goal     OT, PT/OT Ongoing (interventions implemented as appropriate)    Description:  Goals to be met by: 7/20/17     Patient will increase functional independence with ADLs by " performing:    Feeding with Minimal Assistance.  UE Dressing with Minimal Assistance.  Grooming while EOB with Minimal Assistance.  Sitting at edge of bed x2 minutes with Contact Guard Assistance.  Rolling to Right, Left with Minimal Assistance.   Supine to sit with Minimal Assistance.  Toilet transfer to bedside commode with Moderate Assistance.  Upper extremity AAROM/PROM exercise program x10 reps per handout and visual demonstration, with assistance as needed.                      PLAN:  Patient to be seen 5 x/week to address the above listed problems via self-care/home management, neuromuscular re-education, therapeutic activities, therapeutic exercises  Plan of Care expires: 08/05/17  Plan of Care reviewed with: patient, spouse         Beatriz M Idania, OT  07/06/2017

## 2017-07-06 NOTE — PLAN OF CARE
Du visited pt's wife Sirisha in room. Sw inquired about where pt is going as pt is ready for d/c soon. Sirisha stated pt's daughter Asha wants pt to transfer to a hospital in [place]. Sirisha could not remember the name of the hospital. Sirisha said Asha is working on finding and accepting MD and transportation. Sirisha said she called [employer] and asked to have pt disenrolled from his managed medicare plan but, this will not take effect until the end of the month. Du asked to speak to Asha about her plans, Sirisha called Asha and got her voicemail. Sirisha requested Asha call this sw and provided the number. Du will follow.     Kiran Gonzalez, LCSW   [id number]

## 2017-07-06 NOTE — PROGRESS NOTES
CM met with patient and patient's spouse at the bedside at 1300. Patient's spouse stated that they want patient to transfer to SNF in [place]; unable to provide CM with name of facility. Patient's spouse stated she spoke with JOEL Beck this morning and provided SNF choices from list delivered by JOEL Izquierdo on 7/5/17. Patient's spouse unable to recall name of facility or facilities. CM to contact JOEL Beck to discuss SNF choices and discharge plan. CM informed by Dr. De Jesus that patient will be medically ready to discharge tomorrow 7/7/17.    Addendum on 7/6/17 at 1430: CM spoke with JOEL Beck regarding discussion between SW and patient's family. According to SW- patient's family wants patient to transfer to another hospital in [place] and not a SNF. SW to follow up with patient's son Garfield. CM updated Dr. De Jesus.    Mily Gama RN, CM  [facility]  [phone] -[phone]

## 2017-07-06 NOTE — ASSESSMENT & PLAN NOTE
Patient's blood pressure is controlled here in the hospital over past 24 hours. Goal for blood pressure is SBP < 140 and DBP < 90 as patient > or = 60 years of age and patient has advanced chronic kidney disease based on JNC 8 guidelines. Continue current medications of Amlodipine and Toprol XL to treat. Hold Lisinopril for now in light of DASIA. Plan to continue to monitor patient's blood pressure routinely while patient is hospitalized.

## 2017-07-06 NOTE — ASSESSMENT & PLAN NOTE
PT/OT resumed for continued rehab after recent stroke now that acute gout flare to left knee is better. Speech therapy re-evaluated patient on 7/6 and okay to advance diet from pureed diet with thin liquids to dental soft diet with thin liquids. Patient will need SNF placement on discharge and social work and  working on placement with family as patient is now medically stable for discharge.

## 2017-07-06 NOTE — ASSESSMENT & PLAN NOTE
Controlled. Patient without evidence of decompensation presently.  Plan to continue Metoprolol XL to treat. Will continue to hold Lisinopril in light of DASIA.

## 2017-07-06 NOTE — ASSESSMENT & PLAN NOTE
Improved. Likely due to reduced uric acid clearance in CKD. No dietary risk factors. Given advanced age and insulin-dependent diabetes cannot use NSAIDS to treat; Will instead treat gout flare with short course of Prednisone 40mg daily x 5 days and patient on day # 3/5 and patient is responding. Start Allopurinol 100 mg po daily in 1-2 weeks once gout flare resolved for maintenance therapy to prevent future attacks and reduce risk of recurrence.

## 2017-07-06 NOTE — ASSESSMENT & PLAN NOTE
Improved. Creatinine function increased from 1.4 on 7/4 to 1.7 on 7/5. Urine studies done and consistent with prerenal azotemia and likely for decreased oral intake due to somnolence that has greatly improved. Creatinine improved to 1.3 and back to baseline on 7/6 after 1 day of IVF infusion with normal saline. Patient more alert and oral intake much improved so will discontinue IVF's. Plan to avoid any nephrotoxic agents such as NSAIDS, IV contrast or aminoglycosides that could worsen renal function and renally adjust all medications. Monitor daily BMP.

## 2017-07-06 NOTE — SUBJECTIVE & OBJECTIVE
Interval History: Patient more alert today and answering questions appropriately and conversing with family when I entered his room. Patient reports left knee pain is much better and PT/OT worked with patient this am.     Review of Systems   Constitutional: Negative for chills and fever.   Respiratory: Negative for cough.    Cardiovascular: Negative for chest pain and leg swelling.   Gastrointestinal: Negative for nausea and vomiting.   Genitourinary: Negative for flank pain.   Musculoskeletal: Positive for myalgias (Left knee pain).   Skin: Negative for rash.   Neurological: Negative for tremors and light-headedness.   Psychiatric/Behavioral: Negative for confusion and hallucinations.     Objective:     Vital Signs (Most Recent):  Temp: 98.4 °F (36.9 °C) (07/06/17 1200)  Pulse: 83 (07/06/17 1200)  Resp: 16 (07/06/17 1200)  BP: (!) 161/79 (07/06/17 1200)  SpO2: 95 % (07/06/17 1200) Vital Signs (24h Range):  Temp:  [96.9 °F (36.1 °C)-98.8 °F (37.1 °C)] 98.4 °F (36.9 °C)  Pulse:  [65-93] 83  Resp:  [15-18] 16  SpO2:  [86 %-97 %] 95 %  BP: (136-166)/(71-86) 161/79     Weight: 85.3 kg (188 lb)  Body mass index is 27.76 kg/m².    Intake/Output Summary (Last 24 hours) at 07/06/17 1455  Last data filed at 07/06/17 1434   Gross per 24 hour   Intake             1140 ml   Output              800 ml   Net              340 ml      Physical Exam   Constitutional: He appears well-developed and well-nourished. No distress.   HENT:   Mouth/Throat: Oropharynx is clear and moist.   Eyes: Conjunctivae are normal.   Neck: No JVD present.   Cardiovascular: Normal rate, regular rhythm and normal heart sounds.  Exam reveals no gallop and no friction rub.    No murmur heard.  Pulmonary/Chest: Effort normal and breath sounds normal. He has no wheezes. He has no rales.   Abdominal: Soft. Bowel sounds are normal. He exhibits no distension. There is no tenderness.   Musculoskeletal: He exhibits no edema.   Pain on palpation and ROM to left  knee (mainly on flexion of left knee)   Neurological: He is alert. He is not disoriented.   Skin: No rash noted. No erythema.       Significant Labs:   CBC:   Recent Labs  Lab 07/06/17  0427   WBC 14.37*   HGB 11.5*   HCT 34.7*        CMP:   Recent Labs  Lab 07/05/17  0402 07/06/17  0427    145   K 4.2 3.8   * 114*   CO2 25 23   * 162*   BUN 61* 53*   CREATININE 1.7* 1.3   CALCIUM 9.7 9.0   ANIONGAP 7* 8   EGFRNONAA 36.2* 50.1*     POCT Glucose:   Recent Labs  Lab 07/05/17  2223 07/06/17  0848 07/06/17  1254   POCTGLUCOSE 275* 71 109       Significant Imaging: I have reviewed all pertinent imaging results/findings within the past 24 hours.

## 2017-07-06 NOTE — PLAN OF CARE
Problem: Physical Therapy Goal  Goal: Physical Therapy Goal  Goals to be met by: 17     Patient will increase functional independence with mobility by performin. Supine to sit with MInimal Assistance  2. Sit to supine with MInimal Assistance  3. Rolling to Left and Right with Contact Guard Assistance.  4. Sit to stand transfer with Moderate Assistance using RW  5. Bed to chair transfer with Moderate Assistance using Rolling Walker  6. Gait  x 20 feet with Moderate Assistance using Rolling Walker.   7. Sitting at edge of bed x10 minutes with Supervision  8. Lower extremity exercise program x20 reps per handout, with assistance as needed    Outcome: Ongoing (interventions implemented as appropriate)  Goals set today

## 2017-07-06 NOTE — ASSESSMENT & PLAN NOTE
Resolved. Patient with no further somnolence and much more alert. Somnolence likely due to delayed clearance of Tramadol in setting of advanced age and CKD.  Avoid use of tramadol in future and for now just Tylenol for pain. No further neuro imaging needed at this time as clinically improving. No evidence to suggest metabolic cause as BUN only minimally elevated and sodium and calcium normal. Patient with no fever, tachycardia or leukocytosis to suggest infectious cause. Continue to monitor.

## 2017-07-06 NOTE — PT/OT/SLP PROGRESS
Occupational Therapy  Treatment    Emmett Perez   MRN: [id number]   Admitting Diagnosis: Acute encephalopathy    OT Date of Treatment: 07/06/17   OT Start Time: 1140  OT Stop Time: 1219  OT Total Time (min): 39 min    Billable Minutes:  Self Care/Home Management 25 and Neuromuscular Re-education 14    General Precautions: Standard, fall, seizure  Orthopedic Precautions: N/A  Braces: N/A    Do you have any cultural, spiritual, Scientology conflicts, given your current situation?: None     Subjective:  Communicated with nurse prior to session.    Pain/Comfort  Pain Rating 1: 9/10  Location - Side 1: Left  Location - Orientation 1: generalized  Location 1: knee  Pain Addressed 1: Reposition, Distraction  Pain Rating Post-Intervention 1: 9/10    Objective:  Patient found with: peripheral IV, SCD     Functional Mobility:  Bed Mobility:  Rolling/Turning to Left: Minimum assistance  Rolling/Turning Right: Moderate assistance  Scooting/Bridging: Maximum Assistance  Supine to Sit: Maximum Assistance  Sit to Supine: Maximum Assistance    Transfers:   Sit <> Stand Assistance: Total Assistance ((A) to steady on (L) side and to extend hips and knees to erect posture.)  Sit <> Stand Assistive Device: No Assistive Device  Bed <> Chair Transfer Assistance: Activity did not occur  Toilet Transfer Assistance: Activity Did not Occur    Functional Ambulation: Activity did not occur    Activities of Daily Living:  Feeding Level of Assistance: Activity did not occur    UE Dressing Level of Assistance: Moderate assistance (with Pt sitting EOB and needing (A) to thread (L) UE into sleevwe and to pass gown behind back.   Min (A) needed to sit EOB with V/Cs for Pt to make postural adjustments sitting EOB.)    LE Dressing Level of Assistance: Maximum assistance (Pt sitting EOB with Mod assist provided as Pt leaned forward to doff both socks. Pt attempted  but was unable to don socks without assist. (A) to start socks over toes with Pt pulling  "socks over heels. Brief not tested 2/2 to Pt already wearing depends )    Grooming Position: other (Siting with HOB elevated to 60 degrees. Pt washing face, hands and performing christopher care after set up.)  Grooming Level of Assistance: Minimum assistance     Toileting Level of Assistance: Activity did not occur      Balance:   Static Sit: POOR+: Needs MINIMAL assist to maintain  Dynamic Sit: POOR: N/A  Static Stand: POOR: Needs Total  assist to maintain  Dynamic stand: 0: N/A    Therapeutic Activities :  Performed A/AROM with (L) UE performing reaching activity including Shld Flexion and Abduction, Horiz Ab/Adduction, IR, Elbow and Wrist flexion and extension and finger flexion and extension. Performed all with Pt sitting EOB with (A) to maintain sitting balance.    Pt worked on dynamic sitting balance with (L) UE wt bearing on bed  With Pt working on wt shifting in and out of midline positioning in all planes. Min (A) required to Initiate wt shifting.    AM-PAC 6 CLICK ADL   How much help from another person does this patient currently need?   1 = Unable, Total/Dependent Assistance  2 = A lot, Maximum/Moderate Assistance  3 = A little, Minimum/Contact Guard/Supervision  4 = None, Modified Stearns/Independent    Putting on and taking off regular lower body clothing? : 2  Bathing (including washing, rinsing, drying)?: 1  Toileting, which includes using toilet, bedpan, or urinal? : 1  Putting on and taking off regular upper body clothing?: 2  Taking care of personal grooming such as brushing teeth?: 3  Eating meals?: 2  Total Score: 11     AM-PAC Raw Score CMS "G-Code Modifier Level of Impairment Assistance   6 % Total / Unable   7 - 8 CM 80 - 100% Maximal Assist   9-13 CL 60 - 80% Moderate Assist   14 - 19 CK 40 - 60% Moderate Assist   20 - 22 CJ 20 - 40% Minimal Assist   23 CI 1-20% SBA / CGA   24 CH 0% Independent/ Mod I       Patient left HOB elevated with all lines intact, call button in reach, nurse " notified and wife present    ASSESSMENT:  Emmett Perez is a 84 y.o. male with a medical diagnosis of Acute encephalopathy .  Pt tolerated Tx without incident but continues to require (A) to perform functional activities to completion and transfers safely. Pt is making slow progress but  continues to require OT Intervention to further his functional (I)ce and safety. Goals remain appropriate and continued OT is recommended.    Rehab identified problem list/impairments: Rehab identified problem list/impairments: weakness, impaired endurance, impaired functional mobilty, impaired self care skills, impaired balance, visual deficits, impaired cognition, decreased lower extremity function, decreased upper extremity function, decreased coordination, decreased safety awareness, pain, abnormal tone, impaired fine motor, impaired coordination, decreased ROM, gait instability    Rehab potential Good    Activity tolerance: Good    Discharge recommendations: Discharge Facility/Level Of Care Needs:   Nursing facility, skilled     Barriers to discharge: Barriers to Discharge: Decreased caregiver support    Equipment recommendations: rollator, wheelchair (Pt's wife indicating that Pt's W/C and rollator iare in poor condition due to age of this equipment.)     GOALS:    Occupational Therapy Goals        Problem: Occupational Therapy Goal    Goal Priority Disciplines Outcome Interventions   Occupational Therapy Goal     OT, PT/OT Ongoing (interventions implemented as appropriate)    Description:  Goals to be met by: 7/20/17     Patient will increase functional independence with ADLs by performing:    Feeding with Minimal Assistance.  UE Dressing with Minimal Assistance.  Grooming while EOB with Minimal Assistance.  Sitting at edge of bed x2 minutes with Contact Guard Assistance.  Rolling to Right, Left with Minimal Assistance.   Supine to sit with Minimal Assistance.  Toilet transfer to bedside commode with Moderate  Assistance.  Upper extremity AAROM/PROM exercise program x10 reps per handout and visual demonstration, with assistance as needed.                      Plan:  Patient to be seen 5 x/week to address the above listed problems via self-care/home management, therapeutic activities, therapeutic exercises, cognitive retraining, sensory integration, neuromuscular re-education  Plan of Care expires: 08/05/17  Plan of Care reviewed with: patient, spouse         Marvin Montenegro, OTR/L  07/06/2017

## 2017-07-06 NOTE — PROGRESS NOTES
[facility] Hospital Medicine  Progress Note    Patient Name: Emmett Perez  MRN: [id number]  Patient Class: IP- Inpatient   Admission Date: 7/3/2017  Length of Stay: 2 days  Attending Physician: Unique De Jesus MD  Primary Care Provider: Eb Matthews MD    [facility] Medicine Team: [facility] MED K Unique De Jesus MD    Subjective:     Principal Problem:Acute encephalopathy    HPI:  84 y.o. 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 History obtained from family at bedside due to patient's somnolence. The night before presentation he complained of left knee pain and was given a dose of Tramadol, which he had never had before.  Shortly after getting that he became confused and somnolent, not recognizing family.  He slept all through the day and could only be roused to drink some fluids but would not eat or speak, nor participate in therapy. In the ED he was noted to have a warm, swollen left knee upon when arthrocentesis was performed and results consistent with acute gout. He has no prior known history of gout, does not regularly eat shellfish, nor does he drink beer.      Hospital Course:  Patient placed on observation for acute encephalopathy felt due to Tramadol. Patient did have CT scan of head done on 7/3 that showed evolving right thalamic infarction but no detrimental interval change from prior examination. Patient was started on Prednisone 40 mg po daily and Colchicine 0.3 mg po daily for treatment of acute gout to left knee. Patient was placed on just Tylenol alone for pain. Patient remained somnolent  on 7/4 and plan is for MRI imaging of brain if no clinical improvement by 7/5. Patient's labs reviewed and no evidence to suggest infection as cause. Patient with no evidence to suggest seizure as cause. Electrolytes and renal function stable so metabolic encephalopathy not felt to be cause of somnolence. Patient on 7/5 more alert and responsive but still having some periodic episodes of somnolence but improving so no further neuro imaging done as felt likely Tramadol as cause. Patient was noted on 7/5 to have increased Cr from 1.4 to 1.7. Patient started on IVF's and Lisinopril held. Urine studies consistent with prerenal azotemia and patient responded to IVF's with improvement in creatinine to 1.3 on 7/6. IVF's discontinued on 7/6. PT/OT evaluated patient and recommends SNF. Patient on 7/6 much more alert and responsive and family reports patient is back to cognitive baseline. Social work and  working on SNF placement as patient is medically stable for discharge to a skilled nursing facility.     Interval History: Patient more alert today and answering questions appropriately and conversing with family when I entered his room. Patient reports left knee pain is much better and PT/OT worked with patient this am.     Review of Systems   Constitutional: Negative for chills and fever.   Respiratory: Negative for cough.    Cardiovascular: Negative for chest pain and leg swelling.   Gastrointestinal: Negative for nausea and vomiting.   Genitourinary: Negative for flank pain.   Musculoskeletal: Positive for myalgias (Left knee pain).   Skin: Negative for rash.   Neurological: Negative for tremors and light-headedness.   Psychiatric/Behavioral: Negative for confusion and hallucinations.     Objective:     Vital Signs (Most Recent):  Temp: 98.4 °F (36.9 °C) (07/06/17 1200)  Pulse: 83 (07/06/17 1200)  Resp: 16 (07/06/17 1200)  BP: (!) 161/79 (07/06/17 1200)  SpO2: 95 % (07/06/17 1200) Vital Signs (24h  Range):  Temp:  [96.9 °F (36.1 °C)-98.8 °F (37.1 °C)] 98.4 °F (36.9 °C)  Pulse:  [65-93] 83  Resp:  [15-18] 16  SpO2:  [86 %-97 %] 95 %  BP: (136-166)/(71-86) 161/79     Weight: 85.3 kg (188 lb)  Body mass index is 27.76 kg/m².    Intake/Output Summary (Last 24 hours) at 07/06/17 1455  Last data filed at 07/06/17 1434   Gross per 24 hour   Intake             1140 ml   Output              800 ml   Net              340 ml      Physical Exam   Constitutional: He appears well-developed and well-nourished. No distress.   HENT:   Mouth/Throat: Oropharynx is clear and moist.   Eyes: Conjunctivae are normal.   Neck: No JVD present.   Cardiovascular: Normal rate, regular rhythm and normal heart sounds.  Exam reveals no gallop and no friction rub.    No murmur heard.  Pulmonary/Chest: Effort normal and breath sounds normal. He has no wheezes. He has no rales.   Abdominal: Soft. Bowel sounds are normal. He exhibits no distension. There is no tenderness.   Musculoskeletal: He exhibits no edema.   Pain on palpation and ROM to left knee (mainly on flexion of left knee)   Neurological: He is alert. He is not disoriented.   Skin: No rash noted. No erythema.       Significant Labs:   CBC:   Recent Labs  Lab 07/06/17  0427   WBC 14.37*   HGB 11.5*   HCT 34.7*        CMP:   Recent Labs  Lab 07/05/17  0402 07/06/17  0427    145   K 4.2 3.8   * 114*   CO2 25 23   * 162*   BUN 61* 53*   CREATININE 1.7* 1.3   CALCIUM 9.7 9.0   ANIONGAP 7* 8   EGFRNONAA 36.2* 50.1*     POCT Glucose:   Recent Labs  Lab 07/05/17  2223 07/06/17  0848 07/06/17  1254   POCTGLUCOSE 275* 71 109       Significant Imaging: I have reviewed all pertinent imaging results/findings within the past 24 hours.    Assessment/Plan:      * Acute encephalopathy    Resolved. Patient with no further somnolence and much more alert. Somnolence likely due to delayed clearance of Tramadol in setting of advanced age and CKD.  Avoid use of tramadol in future  and for now just Tylenol for pain. No further neuro imaging needed at this time as clinically improving. No evidence to suggest metabolic cause as BUN only minimally elevated and sodium and calcium normal. Patient with no fever, tachycardia or leukocytosis to suggest infectious cause. Continue to monitor.           Acute renal failure superimposed on stage 3 chronic kidney disease    Improved. Creatinine function increased from 1.4 on 7/4 to 1.7 on 7/5. Urine studies done and consistent with prerenal azotemia and likely for decreased oral intake due to somnolence that has greatly improved. Creatinine improved to 1.3 and back to baseline on 7/6 after 1 day of IVF infusion with normal saline. Patient more alert and oral intake much improved so will discontinue IVF's. Plan to avoid any nephrotoxic agents such as NSAIDS, IV contrast or aminoglycosides that could worsen renal function and renally adjust all medications. Monitor daily BMP.           Acute gout due to renal impairment involving left knee    Improved. Likely due to reduced uric acid clearance in CKD. No dietary risk factors. Given advanced age and insulin-dependent diabetes cannot use NSAIDS to treat; Will instead treat gout flare with short course of Prednisone 40mg daily x 5 days and patient on day # 3/5 and patient is responding. Start Allopurinol 100 mg po daily in 1-2 weeks once gout flare resolved for maintenance therapy to prevent future attacks and reduce risk of recurrence.            Type 2 diabetes mellitus with hyperglycemia, with long-term current use of insulin    Type 2 diabetes mellitus with diabetic polyneuropathy, with long-term current use of insulin  Type 2 diabetes mellitus with stage 3 chronic kidney disease, with long-term current use of insulin  Much improved after adjusting insulin dosing over past several days. Hyperglycemia related to poor control prior to this hospital stay and addition of Prednisone to treat acute gout. HgA1C  9.8% and not at goal on 6/22. Plan is to continue Levemir at 72 units at bedtime and Novolog from 15 units with meals. Monitor POCT glucose 4 times a day with each meal and at bedtime and cover with Novolog low dose sliding scale insulin. 2000 calorie diabetic diet. Target pre-meal glucose goal is <140 with all random glucoses <180 in non-critically ill patient.         Late onset Alzheimer's disease without behavioral disturbance    Controlled. Patient likely more susceptible to effects of sedating medications.  Avoid tramadol in future. Continue Namenda and Aricept to treat dementia.  Delirium precautions.          Essential hypertension    Patient's blood pressure is controlled here in the hospital over past 24 hours. Goal for blood pressure is SBP < 140 and DBP < 90 as patient > or = 60 years of age and patient has advanced chronic kidney disease based on JNC 8 guidelines. Continue current medications of Amlodipine and Toprol XL to treat. Hold Lisinopril for now in light of DASIA. Plan to continue to monitor patient's blood pressure routinely while patient is hospitalized.           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 Speech therapy re-evaluated patient on 7/6 and okay to advance diet from pureed diet with thin liquids to dental soft diet with thin liquids. Patient will need SNF placement on discharge and social work and  working on placement with family as patient is now medically stable for discharge.          Chronic combined systolic and diastolic heart failure    Controlled. Patient without evidence of decompensation presently.  Plan to continue Metoprolol XL to treat. Will continue to hold Lisinopril in light of DASIA.           Type 2 diabetes mellitus with stage 3 chronic kidney disease, with long-term current use of insulin    Patient is at baseline  renal function and controlled at present. Will avoid any nephrotoxic agents such as NSAIDS, IV contrast or aminoglycosides unless necessary while hospitalized and will renally adjust medications based on patient's creatinine clearance. Strict I+Os. Monitor daily BMP to monitor renal function.             Type 2 diabetes mellitus with diabetic polyneuropathy, with long-term current use of insulin                VTE Risk Mitigation         Ordered     enoxaparin injection 40 mg  Daily     Route:  Subcutaneous        07/04/17 0005     Medium Risk of VTE  Once      07/04/17 0005          Unique De Jesus MD  Department of Hospital Medicine   [facility]

## 2017-07-07 PROBLEM — G93.40 ACUTE ENCEPHALOPATHY: Status: RESOLVED | Noted: 2017-07-03 | Resolved: 2017-07-07

## 2017-07-07 LAB
ANION GAP SERPL CALC-SCNC: 9 MMOL/L
BASOPHILS # BLD AUTO: 0.01 K/UL
BASOPHILS NFR BLD: 0.1 %
BUN SERPL-MCNC: 34 MG/DL
CALCIUM SERPL-MCNC: 9.2 MG/DL
CHLORIDE SERPL-SCNC: 113 MMOL/L
CO2 SERPL-SCNC: 24 MMOL/L
CREAT SERPL-MCNC: 1 MG/DL
DIFFERENTIAL METHOD: ABNORMAL
EOSINOPHIL # BLD AUTO: 0 K/UL
EOSINOPHIL NFR BLD: 0 %
ERYTHROCYTE [DISTWIDTH] IN BLOOD BY AUTOMATED COUNT: 12.6 %
EST. GFR  (AFRICAN AMERICAN): >60 ML/MIN/1.73 M^2
EST. GFR  (NON AFRICAN AMERICAN): >60 ML/MIN/1.73 M^2
GLUCOSE SERPL-MCNC: 42 MG/DL
HCT VFR BLD AUTO: 37.5 %
HGB BLD-MCNC: 12.7 G/DL
LYMPHOCYTES # BLD AUTO: 2.1 K/UL
LYMPHOCYTES NFR BLD: 17.4 %
MCH RBC QN AUTO: 27.3 PG
MCHC RBC AUTO-ENTMCNC: 33.9 %
MCV RBC AUTO: 81 FL
MONOCYTES # BLD AUTO: 0.5 K/UL
MONOCYTES NFR BLD: 4.3 %
NEUTROPHILS # BLD AUTO: 9.6 K/UL
NEUTROPHILS NFR BLD: 77.9 %
PLATELET # BLD AUTO: 306 K/UL
PMV BLD AUTO: 11.2 FL
POCT GLUCOSE: 207 MG/DL (ref 70–110)
POCT GLUCOSE: 222 MG/DL (ref 70–110)
POCT GLUCOSE: 260 MG/DL (ref 70–110)
POCT GLUCOSE: 277 MG/DL (ref 70–110)
POCT GLUCOSE: 39 MG/DL (ref 70–110)
POCT GLUCOSE: 47 MG/DL (ref 70–110)
POTASSIUM SERPL-SCNC: 3.4 MMOL/L
RBC # BLD AUTO: 4.65 M/UL
SODIUM SERPL-SCNC: 146 MMOL/L
WBC # BLD AUTO: 12.32 K/UL

## 2017-07-07 PROCEDURE — 25000003 PHARM REV CODE 250: Performed by: HOSPITALIST

## 2017-07-07 PROCEDURE — 11000001 HC ACUTE MED/SURG PRIVATE ROOM

## 2017-07-07 PROCEDURE — 36415 COLL VENOUS BLD VENIPUNCTURE: CPT

## 2017-07-07 PROCEDURE — 80048 BASIC METABOLIC PNL TOTAL CA: CPT

## 2017-07-07 PROCEDURE — 97110 THERAPEUTIC EXERCISES: CPT

## 2017-07-07 PROCEDURE — 99232 SBSQ HOSP IP/OBS MODERATE 35: CPT | Mod: ,,, | Performed by: INTERNAL MEDICINE

## 2017-07-07 PROCEDURE — 97530 THERAPEUTIC ACTIVITIES: CPT

## 2017-07-07 PROCEDURE — G8980 MOBILITY D/C STATUS: HCPCS | Mod: CM

## 2017-07-07 PROCEDURE — 97112 NEUROMUSCULAR REEDUCATION: CPT

## 2017-07-07 PROCEDURE — 85025 COMPLETE CBC W/AUTO DIFF WBC: CPT

## 2017-07-07 PROCEDURE — 63600175 PHARM REV CODE 636 W HCPCS: Performed by: HOSPITALIST

## 2017-07-07 RX ORDER — INSULIN ASPART 100 [IU]/ML
13 INJECTION, SOLUTION INTRAVENOUS; SUBCUTANEOUS
Status: DISCONTINUED | OUTPATIENT
Start: 2017-07-07 | End: 2017-07-09 | Stop reason: HOSPADM

## 2017-07-07 RX ADMIN — INSULIN ASPART 1 UNITS: 100 INJECTION, SOLUTION INTRAVENOUS; SUBCUTANEOUS at 09:07

## 2017-07-07 RX ADMIN — PREDNISONE 40 MG: 20 TABLET ORAL at 09:07

## 2017-07-07 RX ADMIN — METOPROLOL SUCCINATE 25 MG: 25 TABLET, EXTENDED RELEASE ORAL at 09:07

## 2017-07-07 RX ADMIN — POLYETHYLENE GLYCOL 3350 17 G: 17 POWDER, FOR SOLUTION ORAL at 09:07

## 2017-07-07 RX ADMIN — MEMANTINE HYDROCHLORIDE 10 MG: 10 TABLET ORAL at 09:07

## 2017-07-07 RX ADMIN — ATORVASTATIN CALCIUM 40 MG: 20 TABLET, FILM COATED ORAL at 09:07

## 2017-07-07 RX ADMIN — ENOXAPARIN SODIUM 40 MG: 100 INJECTION SUBCUTANEOUS at 05:07

## 2017-07-07 RX ADMIN — DEXTROSE MONOHYDRATE 25 G: 25 INJECTION, SOLUTION INTRAVENOUS at 09:07

## 2017-07-07 RX ADMIN — INSULIN ASPART 13 UNITS: 100 INJECTION, SOLUTION INTRAVENOUS; SUBCUTANEOUS at 04:07

## 2017-07-07 RX ADMIN — AMLODIPINE BESYLATE 10 MG: 10 TABLET ORAL at 09:07

## 2017-07-07 RX ADMIN — INSULIN DETEMIR 68 UNITS: 100 INJECTION, SOLUTION SUBCUTANEOUS at 09:07

## 2017-07-07 RX ADMIN — INSULIN ASPART 3 UNITS: 100 INJECTION, SOLUTION INTRAVENOUS; SUBCUTANEOUS at 04:07

## 2017-07-07 RX ADMIN — ASPIRIN 81 MG CHEWABLE TABLET 81 MG: 81 TABLET CHEWABLE at 09:07

## 2017-07-07 RX ADMIN — DONEPEZIL HYDROCHLORIDE 10 MG: 10 TABLET, FILM COATED ORAL at 09:07

## 2017-07-07 NOTE — ASSESSMENT & PLAN NOTE
Controlled. Patient without evidence of decompensation presently. Plan to continue Metoprolol XL to treat. Will plan to resume Lisinopril tomorrow, 7/8.

## 2017-07-07 NOTE — PLAN OF CARE
Problem: Physical Therapy Goal  Goal: Physical Therapy Goal  Goals to be met by: 17     Patient will increase functional independence with mobility by performin. Supine to sit with MInimal Assistance  2. Sit to supine with MInimal Assistance  3. Rolling to Left and Right with Contact Guard Assistance.  4. Sit to stand transfer with Moderate Assistance using RW  5. Bed to chair transfer with Moderate Assistance using Rolling Walker  6. Gait  x 20 feet with Moderate Assistance using Rolling Walker.   7. Sitting at edge of bed x10 minutes with Supervision  8. Lower extremity exercise program x20 reps per handout, with assistance as needed     Outcome: Ongoing (interventions implemented as appropriate)  Pt participating in therapy.  POC and goals remain appropriate.

## 2017-07-07 NOTE — ASSESSMENT & PLAN NOTE
PT/OT resumed for continued rehab after recent stroke now that acute gout flare to left knee is better. Speech therapy re-evaluated patient on 7/6 and okay to advance diet from pureed diet with thin liquids to dental soft diet with thin liquids. Patient will need SNF placement on discharge and social work and  working on placement with family as patient is now medically stable for discharge. Family at this point wants return to [facility] so consult placed.

## 2017-07-07 NOTE — ASSESSMENT & PLAN NOTE
Resolved. Creatinine function increased from 1.4 on 7/4 to 1.7 on 7/5. Urine studies done and consistent with prerenal azotemia and likely for decreased oral intake due to somnolence that has greatly improved. Creatinine improved to 1.3 on 7/6 after 1 day of IVF infusion with normal saline. Patient more alert and oral intake much improved so discontinued IVF's on 7/6 and Creatinine even better and down to 1.0 on 7/7. Plan to avoid any nephrotoxic agents such as NSAIDS, IV contrast or aminoglycosides that could worsen renal function and renally adjust all medications. Monitor daily BMP.

## 2017-07-07 NOTE — SUBJECTIVE & OBJECTIVE
Interval History: Patient back to cognitive baseline. Left knee pain much improved and now working on SNF placement. Family at first wanted a facility in [place] but that is likely unrealistic so now at this point wife requesting return back to [facility] so consult placed late this afternoon.     Review of Systems   Constitutional: Negative for chills and fever.   Respiratory: Negative for cough.    Cardiovascular: Negative for chest pain and leg swelling.   Gastrointestinal: Negative for nausea and vomiting.   Genitourinary: Negative for flank pain.   Musculoskeletal: Positive for myalgias (Left knee pain).   Skin: Negative for rash.   Neurological: Negative for tremors and light-headedness.   Psychiatric/Behavioral: Negative for confusion and hallucinations.     Objective:     Vital Signs (Most Recent):  Temp: 97.1 °F (36.2 °C) (07/07/17 1205)  Pulse: 64 (07/07/17 1205)  Resp: 18 (07/07/17 1205)  BP: (!) 148/70 (07/07/17 1205)  SpO2: 96 % (07/07/17 1205) Vital Signs (24h Range):  Temp:  [97.1 °F (36.2 °C)-99.7 °F (37.6 °C)] 97.1 °F (36.2 °C)  Pulse:  [64-81] 64  Resp:  [18-20] 18  SpO2:  [93 %-96 %] 96 %  BP: (114-164)/(58-86) 148/70     Weight: 85.3 kg (188 lb)  Body mass index is 27.76 kg/m².  No intake or output data in the 24 hours ending 07/07/17 1725   Physical Exam   Constitutional: He appears well-developed and well-nourished. No distress.   HENT:   Mouth/Throat: Oropharynx is clear and moist.   Eyes: Conjunctivae are normal.   Neck: No JVD present.   Cardiovascular: Normal rate, regular rhythm and normal heart sounds.  Exam reveals no gallop and no friction rub.    No murmur heard.  Pulmonary/Chest: Effort normal and breath sounds normal. He has no wheezes. He has no rales.   Abdominal: Soft. Bowel sounds are normal. He exhibits no distension. There is no tenderness.   Musculoskeletal: He exhibits no edema.   Pain on ROM to left knee (mainly on flexion of left knee)   Neurological: He is alert. He is  not disoriented.   Skin: No rash noted. No erythema.       Significant Labs:   CBC:   Recent Labs  Lab 07/06/17  0427 07/07/17  0400   WBC 14.37* 12.32   HGB 11.5* 12.7*   HCT 34.7* 37.5*    306     CMP:   Recent Labs  Lab 07/06/17  0427 07/07/17  0400    146*   K 3.8 3.4*   * 113*   CO2 23 24   * 42*   BUN 53* 34*   CREATININE 1.3 1.0   CALCIUM 9.0 9.2   ANIONGAP 8 9   EGFRNONAA 50.1* >60.0     POCT Glucose:   Recent Labs  Lab 07/07/17  0930 07/07/17  0952 07/07/17  1657   POCTGLUCOSE 47* 207* 277*       Significant Imaging: I have reviewed all pertinent imaging results/findings within the past 24 hours.

## 2017-07-07 NOTE — PLAN OF CARE
"Lesly visited with pt's wife Sirisha and informed her this lesly has spoken briefly with Asha and did not get a good sense of what the plan is for pt to transfer to [place]. Sirisha agreed and expressed frustration. Lesly informed Sirisha that logistically the plan to transfer pt to [place] is unlikely to work out. Sirisha agreed. Sirisha stated she was trying to "apease" pt's children but at this point, because Sirisha knows pt does not need to be in the hospital any longer, Sirisha would like pt to return to [facility]. Lesly requested consult.     Kiran Gonzalez, [facility]   [id number]    "

## 2017-07-07 NOTE — PROGRESS NOTES
[facility] Hospital Medicine  Progress Note    Patient Name: Emmett Perez  MRN: [id number]  Patient Class: IP- Inpatient   Admission Date: 7/3/2017  Length of Stay: 3 days  Attending Physician: Unique De Jesus MD  Primary Care Provider: Eb Matthews MD    [facility] Medicine Team: [facility] K Unique De Jesus MD    Subjective:     Principal Problem:Acute encephalopathy    HPI:  84 y.o. 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 History obtained from family at bedside due to patient's somnolence. The night before presentation he complained of left knee pain and was given a dose of Tramadol, which he had never had before.  Shortly after getting that he became confused and somnolent, not recognizing family.  He slept all through the day and could only be roused to drink some fluids but would not eat or speak, nor participate in therapy. In the ED he was noted to have a warm, swollen left knee upon when arthrocentesis was performed and results consistent with acute gout. He has no prior known history of gout, does not regularly eat shellfish, nor does he drink beer.      Hospital Course:  Patient placed on observation for acute encephalopathy felt due to Tramadol. Patient did have CT scan of head done on 7/3 that showed evolving right thalamic infarction but no detrimental interval change from prior examination. Patient was started on Prednisone 40 mg po daily and Colchicine 0.3 mg po daily for treatment of acute gout to left knee. Patient was placed on just Tylenol alone for pain. Patient remained somnolent  on 7/4 and plan is for MRI imaging of brain if no clinical improvement by 7/5. Patient's labs reviewed and no evidence to suggest infection as cause. Patient with no evidence to suggest seizure as cause. Electrolytes and renal function stable so metabolic encephalopathy not felt to be cause of somnolence. Patient on 7/5 more alert and responsive but still having some periodic episodes of somnolence but improving so no further neuro imaging done as felt likely Tramadol as cause. Patient was noted on 7/5 to have increased Cr from 1.4 to 1.7. Patient started on IVF's and Lisinopril held. Urine studies consistent with prerenal azotemia and patient responded to IVF's with improvement in creatinine to 1.3 on 7/6. IVF's discontinued on 7/6. PT/OT evaluated patient and recommends SNF. Patient on 7/6 much more alert and responsive and family reports patient is back to cognitive baseline. Social work and  working on SNF placement as patient is medically stable for discharge to a skilled nursing facility. Family finally decided wanted to return to [facility] so consult placed on 7/7.     Interval History: Patient back to cognitive baseline. Left knee pain much improved and now working on SNF placement. Family at first wanted a facility in [place] but that is likely unrealistic so now at this point wife requesting return back to [facility] so consult placed late this afternoon.     Review of Systems   Constitutional: Negative for chills and fever.   Respiratory: Negative for cough.    Cardiovascular: Negative for chest pain and leg swelling.   Gastrointestinal: Negative for nausea and vomiting.   Genitourinary: Negative for flank pain.   Musculoskeletal: Positive for myalgias (Left knee pain).   Skin: Negative for rash.   Neurological: Negative for tremors and light-headedness.   Psychiatric/Behavioral: Negative for confusion and hallucinations.     Objective:     Vital Signs (Most Recent):  Temp: 97.1 °F (36.2  °C) (07/07/17 1205)  Pulse: 64 (07/07/17 1205)  Resp: 18 (07/07/17 1205)  BP: (!) 148/70 (07/07/17 1205)  SpO2: 96 % (07/07/17 1205) Vital Signs (24h Range):  Temp:  [97.1 °F (36.2 °C)-99.7 °F (37.6 °C)] 97.1 °F (36.2 °C)  Pulse:  [64-81] 64  Resp:  [18-20] 18  SpO2:  [93 %-96 %] 96 %  BP: (114-164)/(58-86) 148/70     Weight: 85.3 kg (188 lb)  Body mass index is 27.76 kg/m².  No intake or output data in the 24 hours ending 07/07/17 1725   Physical Exam   Constitutional: He appears well-developed and well-nourished. No distress.   HENT:   Mouth/Throat: Oropharynx is clear and moist.   Eyes: Conjunctivae are normal.   Neck: No JVD present.   Cardiovascular: Normal rate, regular rhythm and normal heart sounds.  Exam reveals no gallop and no friction rub.    No murmur heard.  Pulmonary/Chest: Effort normal and breath sounds normal. He has no wheezes. He has no rales.   Abdominal: Soft. Bowel sounds are normal. He exhibits no distension. There is no tenderness.   Musculoskeletal: He exhibits no edema.   Pain on ROM to left knee (mainly on flexion of left knee)   Neurological: He is alert. He is not disoriented.   Skin: No rash noted. No erythema.       Significant Labs:   CBC:   Recent Labs  Lab 07/06/17  0427 07/07/17  0400   WBC 14.37* 12.32   HGB 11.5* 12.7*   HCT 34.7* 37.5*    306     CMP:   Recent Labs  Lab 07/06/17  0427 07/07/17  0400    146*   K 3.8 3.4*   * 113*   CO2 23 24   * 42*   BUN 53* 34*   CREATININE 1.3 1.0   CALCIUM 9.0 9.2   ANIONGAP 8 9   EGFRNONAA 50.1* >60.0     POCT Glucose:   Recent Labs  Lab 07/07/17  0930 07/07/17  0952 07/07/17  1657   POCTGLUCOSE 47* 207* 277*       Significant Imaging: I have reviewed all pertinent imaging results/findings within the past 24 hours.    Assessment/Plan:      Acute renal failure superimposed on stage 3 chronic kidney disease    Resolved. Creatinine function increased from 1.4 on 7/4 to 1.7 on 7/5. Urine studies done and consistent  with prerenal azotemia and likely for decreased oral intake due to somnolence that has greatly improved. Creatinine improved to 1.3 on 7/6 after 1 day of IVF infusion with normal saline. Patient more alert and oral intake much improved so discontinued IVF's on 7/6 and Creatinine even better and down to 1.0 on 7/7. Plan to avoid any nephrotoxic agents such as NSAIDS, IV contrast or aminoglycosides that could worsen renal function and renally adjust all medications. Monitor daily BMP.           Acute gout due to renal impairment involving left knee    Improved. Likely due to reduced uric acid clearance in CKD. No dietary risk factors. Given advanced age and insulin-dependent diabetes cannot use NSAIDS to treat; Will instead treat gout flare with short course of Prednisone 40mg daily x 5 days and patient on day # 3/5 and patient is responding. Start Allopurinol 100 mg po daily in 1-2 weeks once gout flare resolved for maintenance therapy to prevent future attacks and reduce risk of recurrence.            Type 2 diabetes mellitus with hyperglycemia, with long-term current use of insulin    Type 2 diabetes mellitus with diabetic polyneuropathy, with long-term current use of insulin  Type 2 diabetes mellitus with stage 3 chronic kidney disease, with long-term current use of insulin  Patient with several episodes of hypoglycemia on 7/7 to as low as 42. Am doses of Novolog held for breakfast and lunch due to hypoglycemia. Patient given D 50 with resolution. Patient eating 50% of meals. HgA1C 9.8% and not at goal on 6/22. Plan is to decrease Levemir from 72 to 68 units at bedtime and Novolog from 15 to 13 units with meals. Monitor POCT glucose 4 times a day with each meal and at bedtime and cover with Novolog low dose sliding scale insulin. 2000 calorie diabetic diet. Target pre-meal glucose goal is <140 with all random glucoses <180 in non-critically ill patient.         Late onset Alzheimer's disease without behavioral  disturbance    Controlled. Patient likely more susceptible to effects of sedating medications.  Avoid tramadol in future. Continue Namenda and Aricept to treat dementia. Delirium precautions.          Essential hypertension    Patient's blood pressure is controlled here in the hospital over past 24 hours. Goal for blood pressure is SBP < 140 and DBP < 90 as patient > or = 60 years of age and patient has advanced chronic kidney disease based on JNC 8 guidelines. Continue current medications of Amlodipine and Toprol XL to treat. Hold Lisinopril for now in light of DASIA. Plan to continue to monitor patient's blood pressure routinely while patient is hospitalized.           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 Speech therapy re-evaluated patient on 7/6 and okay to advance diet from pureed diet with thin liquids to dental soft diet with thin liquids. Patient will need SNF placement on discharge and social work and  working on placement with family as patient is now medically stable for discharge. Family at this point wants return to [facility] so consult placed.           Chronic combined systolic and diastolic heart failure    Controlled. Patient without evidence of decompensation presently. Plan to continue Metoprolol XL to treat. Will plan to resume Lisinopril tomorrow, 7/8.                   VTE Risk Mitigation         Ordered     enoxaparin injection 40 mg  Daily     Route:  Subcutaneous        07/04/17 0005     Medium Risk of VTE  Once      07/04/17 0005          Unique De Jesus MD  Department of Hospital Medicine   [facility]

## 2017-07-07 NOTE — PLAN OF CARE
Problem: Patient Care Overview  Goal: Plan of Care Review  Outcome: Ongoing (interventions implemented as appropriate)  Pt awake at this time. Significant other at bedside. Plan of care discussed with both of them. No questions at present time. Blood glucose checked 170 at bedtime; no coverage needed. Instructed will do AC/HS blood glucose monitoring and cover with sliding scale insulin as needed. Explained to them that patient is to wait for blood glucose to be checked prior to eating. No distress/pain reported this shift. ROM and Neuro checks done. Incontinent care provided. Side rails up x3, call light in reach.

## 2017-07-07 NOTE — ASSESSMENT & PLAN NOTE
Type 2 diabetes mellitus with diabetic polyneuropathy, with long-term current use of insulin  Type 2 diabetes mellitus with stage 3 chronic kidney disease, with long-term current use of insulin  Patient with several episodes of hypoglycemia on 7/7 to as low as 42. Am doses of Novolog held for breakfast and lunch due to hypoglycemia. Patient given D 50 with resolution. Patient eating 50% of meals. HgA1C 9.8% and not at goal on 6/22. Plan is to decrease Levemir from 72 to 68 units at bedtime and Novolog from 15 to 13 units with meals. Monitor POCT glucose 4 times a day with each meal and at bedtime and cover with Novolog low dose sliding scale insulin. 2000 calorie diabetic diet. Target pre-meal glucose goal is <140 with all random glucoses <180 in non-critically ill patient.

## 2017-07-07 NOTE — PT/OT/SLP PROGRESS
"Physical Therapy  Treatment    Emmett Perez   MRN: [id number]   Admitting Diagnosis: Acute encephalopathy, recent CVA, L knee gout    PT Received On: 07/07/17  PT Start Time: 1309     PT Stop Time: 1404    PT Total Time (min): 55 min       Billable Minutes:  Therapeutic Activity 25, Therapeutic Exercise 15 and Neuromuscular Re-education 15    Treatment Type: Treatment  PT/PTA: PT             General Precautions: Standard, aspiration, fall, seizure  Orthopedic Precautions: N/A   Braces:      Do you have any cultural, spiritual, Druze conflicts, given your current situation?: none    Subjective:  Communicated with RN prior to session.  "Pt c/o L knee pain with any ROM."    Pain/Comfort  Pain Rating 1:  (R knee pain, but patient did not rate)    Objective:    Pt found supine in bed with family at bedside.  Upon bed mobility patient found with saturated brief and sheets.  Therapist assisted patient to change soiled brief prior to session.  Bed mobility training performed to both sides.  Sitting balance poor.  Therapist coached patient in dynamic sitting activities to improve sitting balance, midline orientation, and active BUE support. Pt sat EOB x 15 minutes performing static and dynamic sitting balance activities.  Pt participated in select LE exercises to maximize functional mobility.     Functional Mobility:  Bed Mobility:    Rolling R: max assist from hooklying position  Rolling L: moderate assist from hooklying position with patient able to use RUE to assist with rolling   Supine to sit transfer: max assist  · From L sidelying - mod assist to roll f/b max assist to progress LEs off the bed and max assist to come to sit.    · From R sidelying - max assist to roll f/b max assist to progress LEs off the bed and moderate assist to come to sit; patient able to use RUE to assist with coming to sit     Balance:   Static Sit: 0: Needs MAXIMAL assist to maintain sitting without back support; assist of 2 people  Dynamic " Sit: FAIR+: Maintains balance through MINIMAL excursions of active trunk motion    Therapeutic Activities and Exercises:  Pt demonstrated poor sitting balance with posterior lean and no awareness of LOB. Therapist instructed patient in midline orientation.      Neuromuscular re-education: balance and midline orientation exercises.    · Therapist provided skilled verbal and tactile cues to assist patient in achieving midline orientation. After several attempts, patient inconsistent with sitting balance.  Therapist positioned patient in anterior pelvic tilt using wedge with immediate improvement in sitting balance  · Dynamic balance activities performed to improve postural awareness: active anterior, posterior and lateral trunk weight shifts > return to midline with bilateral hands positioned in weight bearing. Pt able to perform weight shifts in all directions and return to midline, but unable to maintain midline position.  Pt with decreased perception of body position in space and LOB.   · Balance decreased with fatigue.     Supine LE exercises to progress mobility:  · R hamstring stretches 3 x20sec  · Heel slides 10x/ea (AAROM on L)  · Hip ABD/ADD 10x/ea (AAROM on L)  · Bridging 10x with assist (patient unable to clear hips from bed.    Therapist educated patient's family on L hamstring stretches to prevent contractures, positioning of L UE and L LE with pillows, and role of PT.     AM-PAC 6 CLICK MOBILITY  How much help from another person does this patient currently need?   1 = Unable, Total/Dependent Assistance  2 = A lot, Maximum/Moderate Assistance  3 = A little, Minimum/Contact Guard/Supervision  4 = None, Modified [place]/Independent    Turning over in bed (including adjusting bedclothes, sheets and blankets)?: 2  Sitting down on and standing up from a chair with arms (e.g., wheelchair, bedside commode, etc.): 1  Moving from lying on back to sitting on the side of the bed?: 2  Moving to and from a bed  to a chair (including a wheelchair)?: 1  Need to walk in hospital room?: 1  Climbing 3-5 steps with a railing?: 1  Total Score: 8    AM-PAC Raw Score CMS G-Code Modifier Level of Impairment Assistance   6 % Total / Unable   7 - 9 CM 80 - 100% Maximal Assist   10 - 14 CL 60 - 80% Moderate Assist   15 - 19 CK 40 - 60% Moderate Assist   20 - 22 CJ 20 - 40% Minimal Assist   23 CI 1-20% SBA / CGA   24 CH 0% Independent/ Mod I     Patient left supine with all lines intact, call button in reach and L knee positioned in extension with ice for comfort. .    Assessment:  Emmett Perez is a 84 y.o. male with a medical diagnosis of Acute encephalopathy s/p recent CVA and presents with decreased functional mobility, pain, decreased ROM, decreased strength, and poor sitting balance.  He participated well in therapy session despite L knee pain.  Patient requires max assist for bed mobility and sitting balance.  Pt is able to maintain sitting balance once positioned in anterior pelvic tilt with bilateral UE support, but fatigues quickly (10 seconds) resulting in LOB.  He did not exhibit reactionary postural control mechanisms in response to LOB.  L hamstring demonstrated increased tone with resulting knee flexion, but responded well to gentle stretching and positioning.  Pt is at high risk for falls due to decreased sitting balance and decreased postural control mechanisms.  He requires progressive skilled PT services to progress mobility, balance and safety.     Rehab identified problem list/impairments: Rehab identified problem list/impairments: weakness, impaired functional mobilty, impaired cognition, pain, impaired balance, decreased upper extremity function, decreased lower extremity function, abnormal tone, decreased ROM    Rehab potential is good.    Activity tolerance: Fair    Discharge recommendations: Discharge Facility/Level Of Care Needs: nursing facility, skilled     Barriers to discharge: Barriers to  Discharge: Decreased caregiver support    Equipment recommendations: Equipment Needed After Discharge:  (TBD)     GOALS:    Physical Therapy Goals        Problem: Physical Therapy Goal    Goal Priority Disciplines Outcome Goal Variances Interventions   Physical Therapy Goal     PT/OT, PT Ongoing (interventions implemented as appropriate)     Description:  Goals to be met by: 17     Patient will increase functional independence with mobility by performin. Supine to sit with MInimal Assistance  2. Sit to supine with MInimal Assistance  3. Rolling to Left and Right with Contact Guard Assistance.  4. Sit to stand transfer with Moderate Assistance using RW  5. Bed to chair transfer with Moderate Assistance using Rolling Walker  6. Gait  x 20 feet with Moderate Assistance using Rolling Walker.   7. Sitting at edge of bed x10 minutes with Supervision  8. Lower extremity exercise program x20 reps per handout, with assistance as needed                      PLAN:    Patient to be seen 5 x/week  to address the above listed problems via gait training, therapeutic activities, therapeutic exercises, neuromuscular re-education, wheelchair management/training  Plan of Care expires: 17  Plan of Care reviewed with: patient, family         Lisa Camarillo, PT  2017

## 2017-07-07 NOTE — PLAN OF CARE
Problem: Patient Care Overview  Goal: Plan of Care Review  Outcome: Ongoing (interventions implemented as appropriate)  Pt AAOx2, calm, vs stable, No Falls noted as fall precautions are active, No complaints of pain, Blood sugar monitoring active, no signs or symptoms of skin breakdown noted, Family at bedside, No complaints at this time, will continue to monitor

## 2017-07-07 NOTE — PLAN OF CARE
Du spoke with Asha who stated she is trying to get pt accepted to [facility] in [place]. Asha stated there are two potential MDs to accept pt. Asha stated she is going to give MD this sw contact number to work out details.     Kiran Gonzalez, LCSW   [id number]

## 2017-07-08 PROBLEM — N18.30 STAGE 3 CHRONIC KIDNEY DISEASE: Status: ACTIVE | Noted: 2017-07-08

## 2017-07-08 PROBLEM — N18.2 CHRONIC KIDNEY DISEASE (CKD), STAGE II (MILD): Status: ACTIVE | Noted: 2017-07-08

## 2017-07-08 LAB
ANION GAP SERPL CALC-SCNC: 8 MMOL/L
BACTERIA BLD CULT: NORMAL
BASOPHILS # BLD AUTO: 0.01 K/UL
BASOPHILS NFR BLD: 0.1 %
BUN SERPL-MCNC: 28 MG/DL
CALCIUM SERPL-MCNC: 9.6 MG/DL
CHLORIDE SERPL-SCNC: 105 MMOL/L
CO2 SERPL-SCNC: 26 MMOL/L
CREAT SERPL-MCNC: 1.1 MG/DL
DIFFERENTIAL METHOD: ABNORMAL
EOSINOPHIL # BLD AUTO: 0 K/UL
EOSINOPHIL NFR BLD: 0 %
ERYTHROCYTE [DISTWIDTH] IN BLOOD BY AUTOMATED COUNT: 12.3 %
EST. GFR  (AFRICAN AMERICAN): >60 ML/MIN/1.73 M^2
EST. GFR  (NON AFRICAN AMERICAN): >60 ML/MIN/1.73 M^2
GLUCOSE SERPL-MCNC: 210 MG/DL
HCT VFR BLD AUTO: 40.4 %
HGB BLD-MCNC: 13.6 G/DL
LYMPHOCYTES # BLD AUTO: 1.5 K/UL
LYMPHOCYTES NFR BLD: 13.2 %
MCH RBC QN AUTO: 26.9 PG
MCHC RBC AUTO-ENTMCNC: 33.7 %
MCV RBC AUTO: 80 FL
MONOCYTES # BLD AUTO: 0.6 K/UL
MONOCYTES NFR BLD: 5 %
NEUTROPHILS # BLD AUTO: 9.4 K/UL
NEUTROPHILS NFR BLD: 81.4 %
PLATELET # BLD AUTO: 292 K/UL
PMV BLD AUTO: 10.7 FL
POCT GLUCOSE: 208 MG/DL (ref 70–110)
POCT GLUCOSE: 214 MG/DL (ref 70–110)
POCT GLUCOSE: 216 MG/DL (ref 70–110)
POTASSIUM SERPL-SCNC: 3.9 MMOL/L
RBC # BLD AUTO: 5.06 M/UL
SODIUM SERPL-SCNC: 139 MMOL/L
WBC # BLD AUTO: 11.48 K/UL

## 2017-07-08 PROCEDURE — 85025 COMPLETE CBC W/AUTO DIFF WBC: CPT

## 2017-07-08 PROCEDURE — 25000003 PHARM REV CODE 250: Performed by: HOSPITALIST

## 2017-07-08 PROCEDURE — 36415 COLL VENOUS BLD VENIPUNCTURE: CPT

## 2017-07-08 PROCEDURE — 25000003 PHARM REV CODE 250: Performed by: INTERNAL MEDICINE

## 2017-07-08 PROCEDURE — 80048 BASIC METABOLIC PNL TOTAL CA: CPT

## 2017-07-08 PROCEDURE — 63600175 PHARM REV CODE 636 W HCPCS: Performed by: HOSPITALIST

## 2017-07-08 PROCEDURE — 11000001 HC ACUTE MED/SURG PRIVATE ROOM

## 2017-07-08 PROCEDURE — 99232 SBSQ HOSP IP/OBS MODERATE 35: CPT | Mod: ,,, | Performed by: INTERNAL MEDICINE

## 2017-07-08 RX ORDER — LISINOPRIL 5 MG/1
5 TABLET ORAL DAILY
Status: DISCONTINUED | OUTPATIENT
Start: 2017-07-08 | End: 2017-07-09 | Stop reason: HOSPADM

## 2017-07-08 RX ADMIN — ENOXAPARIN SODIUM 40 MG: 100 INJECTION SUBCUTANEOUS at 04:07

## 2017-07-08 RX ADMIN — INSULIN ASPART 2 UNITS: 100 INJECTION, SOLUTION INTRAVENOUS; SUBCUTANEOUS at 05:07

## 2017-07-08 RX ADMIN — ATORVASTATIN CALCIUM 40 MG: 20 TABLET, FILM COATED ORAL at 09:07

## 2017-07-08 RX ADMIN — INSULIN ASPART 2 UNITS: 100 INJECTION, SOLUTION INTRAVENOUS; SUBCUTANEOUS at 01:07

## 2017-07-08 RX ADMIN — INSULIN DETEMIR 68 UNITS: 100 INJECTION, SOLUTION SUBCUTANEOUS at 08:07

## 2017-07-08 RX ADMIN — MEMANTINE HYDROCHLORIDE 10 MG: 10 TABLET ORAL at 09:07

## 2017-07-08 RX ADMIN — INSULIN ASPART 13 UNITS: 100 INJECTION, SOLUTION INTRAVENOUS; SUBCUTANEOUS at 01:07

## 2017-07-08 RX ADMIN — ASPIRIN 81 MG CHEWABLE TABLET 81 MG: 81 TABLET CHEWABLE at 09:07

## 2017-07-08 RX ADMIN — METOPROLOL SUCCINATE 25 MG: 25 TABLET, EXTENDED RELEASE ORAL at 09:07

## 2017-07-08 RX ADMIN — LISINOPRIL 5 MG: 5 TABLET ORAL at 09:07

## 2017-07-08 RX ADMIN — DONEPEZIL HYDROCHLORIDE 10 MG: 10 TABLET, FILM COATED ORAL at 09:07

## 2017-07-08 RX ADMIN — MEMANTINE HYDROCHLORIDE 10 MG: 10 TABLET ORAL at 10:07

## 2017-07-08 RX ADMIN — HYDRALAZINE HYDROCHLORIDE 25 MG: 25 TABLET, FILM COATED ORAL at 05:07

## 2017-07-08 RX ADMIN — PREDNISONE 40 MG: 20 TABLET ORAL at 12:07

## 2017-07-08 RX ADMIN — INSULIN ASPART 13 UNITS: 100 INJECTION, SOLUTION INTRAVENOUS; SUBCUTANEOUS at 08:07

## 2017-07-08 RX ADMIN — INSULIN ASPART 13 UNITS: 100 INJECTION, SOLUTION INTRAVENOUS; SUBCUTANEOUS at 05:07

## 2017-07-08 RX ADMIN — AMLODIPINE BESYLATE 10 MG: 10 TABLET ORAL at 09:07

## 2017-07-08 RX ADMIN — INSULIN ASPART 2 UNITS: 100 INJECTION, SOLUTION INTRAVENOUS; SUBCUTANEOUS at 08:07

## 2017-07-08 NOTE — ASSESSMENT & PLAN NOTE
Improved. Likely due to reduced uric acid clearance in CKD. No dietary risk factors. Given advanced age and insulin-dependent diabetes cannot use NSAIDS to treat; Will instead treat gout flare with short course of Prednisone 40mg daily x 5 days and patient on day # 4/5 and patient is responding. Start Allopurinol 100 mg po daily in 1-2 weeks once gout flare resolved for maintenance therapy to prevent future attacks and reduce risk of recurrence.

## 2017-07-08 NOTE — ASSESSMENT & PLAN NOTE
Patient's blood pressure needs to be addressed more throughly today as patient remains hypertensive on therapy here in the hospital in the past 24 hours. Goal for blood pressure is SBP < 140 and DBP < 90 as patient > or = 60 years of age and patient has advanced chronic kidney disease based on JNC 8 guidelines. Continue current medications of Amlodipine and Toprol XL to treat and plan to restart Lisinopril 5 mg po daily that patient was on prior to this admit that was held for DASIA but DASIA now resolved. Plan to continue to monitor patient's blood pressure routinely while patient is hospitalized.

## 2017-07-08 NOTE — PROGRESS NOTES
[facility] Hospital Medicine  Progress Note    Patient Name: Emmett Perez  MRN: [id number]  Patient Class: IP- Inpatient   Admission Date: 7/3/2017  Length of Stay: 4 days  Attending Physician: Unique De Jesus MD  Primary Care Provider: Eb Matthews MD    [facility] Medicine Team: [facility] HOSP MED K Unique De Jesus MD    Subjective:     Principal Problem:Acute encephalopathy    HPI:  84 y.o. 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 History obtained from family at bedside due to patient's somnolence. The night before presentation he complained of left knee pain and was given a dose of Tramadol, which he had never had before.  Shortly after getting that he became confused and somnolent, not recognizing family.  He slept all through the day and could only be roused to drink some fluids but would not eat or speak, nor participate in therapy. In the ED he was noted to have a warm, swollen left knee upon when arthrocentesis was performed and results consistent with acute gout. He has no prior known history of gout, does not regularly eat shellfish, nor does he drink beer.      Hospital Course:  Patient placed on observation for acute encephalopathy felt due to Tramadol. Patient did have CT scan of head done on 7/3 that showed evolving right thalamic infarction but no detrimental interval change from prior examination. Patient was started on Prednisone 40 mg po daily and Colchicine 0.3 mg po daily for treatment of acute gout to left knee. Patient was placed on just Tylenol alone for pain. Patient remained somnolent  on 7/4 and plan is for MRI imaging of brain if no clinical improvement by 7/5. Patient's labs reviewed and no evidence to suggest infection as cause. Patient with no evidence to suggest seizure as cause. Electrolytes and renal function stable so metabolic encephalopathy not felt to be cause of somnolence. Patient on 7/5 more alert and responsive but still having some periodic episodes of somnolence but improving so no further neuro imaging done as felt likely Tramadol as cause. Patient was noted on 7/5 to have increased Cr from 1.4 to 1.7. Patient started on IVF's and Lisinopril held. Urine studies consistent with prerenal azotemia and patient responded to IVF's with improvement in creatinine to 1.3 on 7/6. IVF's discontinued on 7/6. PT/OT evaluated patient and recommends SNF. Patient on 7/6 much more alert and responsive and family reports patient is back to cognitive baseline. Social work and  working on SNF placement as patient is medically stable for discharge to a skilled nursing facility. Wife decided she wanted patient to return to [facility] so consult placed on 7/7. Patient restarted on Lisinopril for his heart failure and HTN as patient's BP was getting elevated with -180 off of Lisinopril and DASIA had resolved.     Interval History: Patient doing better and more alert but still feels asleep easily but is responsive and able to answer questions when aroused. Patient states left knee pain is better. Long discussion with wife today and she desires patient to return to [facility] for continued PT/OT. I will have a separate note detailing conversation between myself,  Merced and wife and patient's daughter, Asha.     Review of Systems   Constitutional: Negative for chills and fever.   Respiratory: Negative for cough.    Cardiovascular: Negative for chest pain and leg swelling.   Gastrointestinal: Negative for nausea and vomiting.   Genitourinary: Negative for flank pain.    Musculoskeletal: Positive for myalgias (Left knee pain).   Skin: Negative for rash.   Neurological: Negative for tremors and light-headedness.   Psychiatric/Behavioral: Negative for confusion and hallucinations.     Objective:     Vital Signs (Most Recent):  Temp: 98.6 °F (37 °C) (07/08/17 1300)  Pulse: 80 (07/08/17 1330)  Resp: 18 (07/08/17 1300)  BP: 136/63 (07/08/17 1330)  SpO2: 96 % (07/08/17 1300) Vital Signs (24h Range):  Temp:  [96.5 °F (35.8 °C)-98.6 °F (37 °C)] 98.6 °F (37 °C)  Pulse:  [68-90] 80  Resp:  [18-20] 18  SpO2:  [95 %-98 %] 96 %  BP: (136-197)/(63-98) 136/63     Weight: 85.3 kg (188 lb)  Body mass index is 27.76 kg/m².  No intake or output data in the 24 hours ending 07/08/17 1506   Physical Exam   Constitutional: He appears well-developed and well-nourished. No distress.   HENT:   Mouth/Throat: Oropharynx is clear and moist.   Eyes: Conjunctivae are normal.   Neck: No JVD present.   Cardiovascular: Normal rate, regular rhythm and normal heart sounds.  Exam reveals no gallop and no friction rub.    No murmur heard.  Pulmonary/Chest: Effort normal and breath sounds normal. He has no wheezes. He has no rales.   Abdominal: Soft. Bowel sounds are normal. He exhibits no distension. There is no tenderness.   Musculoskeletal: He exhibits no edema.   Pain on ROM to left knee (mainly on flexion of left knee)   Neurological: He is alert. He is not disoriented.   Skin: No rash noted. No erythema.   Psychiatric: He has a normal mood and affect. His behavior is normal.       Significant Labs:   CBC:   Recent Labs  Lab 07/07/17  0400 07/08/17  0452   WBC 12.32 11.48   HGB 12.7* 13.6*   HCT 37.5* 40.4    292     CMP:   Recent Labs  Lab 07/07/17  0400 07/08/17  0452   * 139   K 3.4* 3.9   * 105   CO2 24 26   GLU 42* 210*   BUN 34* 28*   CREATININE 1.0 1.1   CALCIUM 9.2 9.6   ANIONGAP 9 8   EGFRNONAA >60.0 >60.0     POCT Glucose:   Recent Labs  Lab 07/07/17  2115 07/08/17  0809 07/08/17  1256    POCTGLUCOSE 260* 216* 208*       Significant Imaging: I have reviewed all pertinent imaging results/findings within the past 24 hours.    Assessment/Plan:      Type 2 diabetes mellitus with hyperglycemia, with long-term current use of insulin    Type 2 diabetes mellitus with diabetic polyneuropathy, with long-term current use of insulin  Type 2 diabetes mellitus with stage 3 chronic kidney disease, with long-term current use of insulin  Patient with several episodes of hypoglycemia on 7/7 to as low as 42 and insulin dosages decreased and patient with no further hypoglycemia since. Patient eating 50% of meals. HgA1C 9.8% and not at goal on 6/22. Plan is to continue current dosing of Levemir 68 units at bedtime and Novolog 13 units with meals. Monitor POCT glucose 4 times a day with each meal and at bedtime and cover with Novolog low dose sliding scale insulin. 2000 calorie diabetic diet. Target pre-meal glucose goal is <140 with all random glucoses <180 in non-critically ill patient.         Acute gout due to renal impairment involving left knee    Improved. Likely due to reduced uric acid clearance in CKD. No dietary risk factors. Given advanced age and insulin-dependent diabetes cannot use NSAIDS to treat; Will instead treat gout flare with short course of Prednisone 40mg daily x 5 days and patient on day # 4/5 and patient is responding. Start Allopurinol 100 mg po daily in 1-2 weeks once gout flare resolved for maintenance therapy to prevent future attacks and reduce risk of recurrence.            Late onset Alzheimer's disease without behavioral disturbance    Controlled. Patient likely more susceptible to effects of sedating medications.  Avoid tramadol in future. Continue Namenda and Aricept to treat dementia. Delirium precautions.          Essential hypertension    Patient's blood pressure needs to be addressed more throughly today as patient remains hypertensive on therapy here in the hospital in the past  24 hours. Goal for blood pressure is SBP < 140 and DBP < 90 as patient > or = 60 years of age and patient has advanced chronic kidney disease based on JNC 8 guidelines. Continue current medications of Amlodipine and Toprol XL to treat and plan to restart Lisinopril 5 mg po daily that patient was on prior to this admit that was held for DASIA but DASIA now resolved. Plan to continue to monitor patient's blood pressure routinely while patient is hospitalized.           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 Speech therapy re-evaluated patient on 7/6 and okay to advance diet from pureed diet with thin liquids to dental soft diet with thin liquids. Patient will need SNF placement on discharge as recommended by both PT and OT and social work and  working on placement with family as patient is now medically stable for discharge. Wife would like return to [facility] so consult placed.           Chronic combined systolic and diastolic heart failure    Controlled. Patient without evidence of decompensation presently. Plan to continue Metoprolol XL to treat. Will restart Lisinopril 5 mg po daily now that DASIA resolved.           Type 2 diabetes mellitus with stage 3 chronic kidney disease, with long-term current use of insulin    Patient is at baseline renal function and controlled at present. Will avoid any nephrotoxic agents such as NSAIDS, IV contrast or aminoglycosides unless necessary while hospitalized and will renally adjust medications based on patient's creatinine clearance. Strict I+Os. Monitor daily BMP to monitor renal function.             Type 2 diabetes mellitus with diabetic polyneuropathy, with long-term current use of insulin                VTE Risk Mitigation         Ordered     enoxaparin injection 40 mg  Daily     Route:  Subcutaneous        07/04/17 0005      Medium Risk of VTE  Once      07/04/17 0005          Unique De Jesus MD  Department of Hospital Medicine   [facility]

## 2017-07-08 NOTE — SUBJECTIVE & OBJECTIVE
Interval History: Patient doing better and more alert but still feels asleep easily but is responsive and able to answer questions when aroused. Patient states left knee pain is better. Long discussion with wife today and she desires patient to return to [facility] for continued PT/OT. I will have a separate note detailing conversation between myself,  Merced and wife and patient's daughter, Asha.     Review of Systems   Constitutional: Negative for chills and fever.   Respiratory: Negative for cough.    Cardiovascular: Negative for chest pain and leg swelling.   Gastrointestinal: Negative for nausea and vomiting.   Genitourinary: Negative for flank pain.   Musculoskeletal: Positive for myalgias (Left knee pain).   Skin: Negative for rash.   Neurological: Negative for tremors and light-headedness.   Psychiatric/Behavioral: Negative for confusion and hallucinations.     Objective:     Vital Signs (Most Recent):  Temp: 98.6 °F (37 °C) (07/08/17 1300)  Pulse: 80 (07/08/17 1330)  Resp: 18 (07/08/17 1300)  BP: 136/63 (07/08/17 1330)  SpO2: 96 % (07/08/17 1300) Vital Signs (24h Range):  Temp:  [96.5 °F (35.8 °C)-98.6 °F (37 °C)] 98.6 °F (37 °C)  Pulse:  [68-90] 80  Resp:  [18-20] 18  SpO2:  [95 %-98 %] 96 %  BP: (136-197)/(63-98) 136/63     Weight: 85.3 kg (188 lb)  Body mass index is 27.76 kg/m².  No intake or output data in the 24 hours ending 07/08/17 1506   Physical Exam   Constitutional: He appears well-developed and well-nourished. No distress.   HENT:   Mouth/Throat: Oropharynx is clear and moist.   Eyes: Conjunctivae are normal.   Neck: No JVD present.   Cardiovascular: Normal rate, regular rhythm and normal heart sounds.  Exam reveals no gallop and no friction rub.    No murmur heard.  Pulmonary/Chest: Effort normal and breath sounds normal. He has no wheezes. He has no rales.   Abdominal: Soft. Bowel sounds are normal. He exhibits no distension. There is no tenderness.   Musculoskeletal: He  exhibits no edema.   Pain on ROM to left knee (mainly on flexion of left knee)   Neurological: He is alert. He is not disoriented.   Skin: No rash noted. No erythema.   Psychiatric: He has a normal mood and affect. His behavior is normal.       Significant Labs:   CBC:   Recent Labs  Lab 07/07/17  0400 07/08/17  0452   WBC 12.32 11.48   HGB 12.7* 13.6*   HCT 37.5* 40.4    292     CMP:   Recent Labs  Lab 07/07/17  0400 07/08/17  0452   * 139   K 3.4* 3.9   * 105   CO2 24 26   GLU 42* 210*   BUN 34* 28*   CREATININE 1.0 1.1   CALCIUM 9.2 9.6   ANIONGAP 9 8   EGFRNONAA >60.0 >60.0     POCT Glucose:   Recent Labs  Lab 07/07/17  2115 07/08/17  0809 07/08/17  1259   POCTGLUCOSE 260* 216* 208*       Significant Imaging: I have reviewed all pertinent imaging results/findings within the past 24 hours.

## 2017-07-08 NOTE — ASSESSMENT & PLAN NOTE
Type 2 diabetes mellitus with diabetic polyneuropathy, with long-term current use of insulin  Type 2 diabetes mellitus with stage 3 chronic kidney disease, with long-term current use of insulin  Patient with several episodes of hypoglycemia on 7/7 to as low as 42 and insulin dosages decreased and patient with no further hypoglycemia since. Patient eating 50% of meals. HgA1C 9.8% and not at goal on 6/22. Plan is to continue current dosing of Levemir 68 units at bedtime and Novolog 13 units with meals. Monitor POCT glucose 4 times a day with each meal and at bedtime and cover with Novolog low dose sliding scale insulin. 2000 calorie diabetic diet. Target pre-meal glucose goal is <140 with all random glucoses <180 in non-critically ill patient.

## 2017-07-08 NOTE — PLAN OF CARE
Problem: Patient Care Overview  Goal: Plan of Care Review  Outcome: Ongoing (interventions implemented as appropriate)  Pt is progressing with plan of care. Frequent rounds made to assess pain and safety. Pt's pain controlled with pain medication ordered at this time. Pt turned Q2 hours during frequent checks. Briefs changed PRN. Pt skin intact and skin free of breakdown. Blood sugar checked before meals and PRN insulin and scheduled insulin given. Bed locked and at lowest position. Side rails up x 2. Call light within reach. Will continue to monitor. Spouse at bedside.

## 2017-07-08 NOTE — ASSESSMENT & PLAN NOTE
PT/OT resumed for continued rehab after recent stroke now that acute gout flare to left knee is better. Speech therapy re-evaluated patient on 7/6 and okay to advance diet from pureed diet with thin liquids to dental soft diet with thin liquids. Patient will need SNF placement on discharge as recommended by both PT and OT and social work and  working on placement with family as patient is now medically stable for discharge. Wife would like return to [facility] so consult placed.

## 2017-07-08 NOTE — PROGRESS NOTES
Nurse called into room by pt wife. Wife stated she wants to sign pt out AMA. Dr. De Jesus notified and Merced with case management notified also.

## 2017-07-08 NOTE — PLAN OF CARE
Family Conference    Earlier this am I had a conversation with patient's wife, Sirisha who is at bedside. Wife stated that patient's daughter wanted patient transferred to a medical facility in [place] today where she works as a critical care nurse and that she had a number for a Shirlynn for that particular facility that wife stated she wanted her father to be transferred to. Wife was unclear as to what type of facility this was or who would be accepting the patient and stated patient's daughterAsha was making the arrangements as well as the medical transport. At that point I called our  on call, Merced Trent and both Merced and I entered patient's room. Wife was able to get hold of of Jamey at [facility] in [place] who informed the  and myself that they were an LTAC facility in [place] and she was under the impression that we wanted to transfer the patient to their facility. The  and myself then proceeded to inform Jamey that patient is not appropriate for LTAC as both PT/OT are recommending skilled nursing care and that he does not qualify for LTAC as has no LTAC needs. In addition, patient has [employer] managed care and they do not have coverage in [place]. Jamey was under the understanding that patient was switching to straight Medicare but that is not occurring until August 1st and Jamey also was not aware of that situation. Also, Jamey stated that the daughter had arranged for a transport company to take patient by a Critical Care ambulance to [place] via the daughter and that the company in [place] was going to pay for the transport. Both the  and myself explained to wife that this was unlikely and that she would likely be charged as this is a lateral move for the transport and we were concerned.   At this point in the conversation, wife got patient's daughterAsha on speaker phone from [place] to discuss arrangements. Wife was in room  throughout entire conversation between myself and  and daughter, Asha.   Asha started out conversation demanding that we fax to [facility] in [place], [place] all of patient's medical information to get him transferred to an acute care hospital. I explained to daughter that her father no longer needed admission to an acute care hospital and was ready to transfer to a skilled nursing facility. Asha became very demanding and stated that she wanted her father discharged from [facility] today and demanded I write the discharge order and that she had arranged for a Critical Care ambulance company to come from [place] to pick her father up and plan was to have patient taken to the ER at the hospital she works at as she is a nurse at this facility in [place] and that she had arranged for the physicians there to assess the patient and they would determine if patient needed admit to the hospital or needed LTAC or a SNF unit. I explained to daughter as well as our  explained to daughter that is not legal. We cannot discharge a patient so they the family can then take him to another hospital in [place] to be seen in ER there. This is an EMTALA violation. In addition, patient has [employer] insurance so any transport cost or stay in hospital in [place] would be out of network and would cost patient additional money. At this point, patient's wife Sirisha interjected on conversation as she was not aware that the family would have to pay for anything and that everything would be taken care of by [employer]. Wife became very concerned about situation and it was explained to both wife and daughter that legally in [facility] that the wife (Sirisha) has the final decision on what care and where that Mr. Perez receives not the daughter's decision. The daughter did not take this news well and at that point while on the phone started yelling into the phone at the wife and trying to  bully her into signing the patient out against medical advice since I was refusing to discharge Mr. Perez. I explained to wife that I could not discharge Mr. Perez without a safe discharge plan and the plan the daughter was proposing was not only not safe or but not legal. I told wife she could sign him out against medical advice but she would be legally responsible for his care once he left the hospital. Wife at that point was clear that she did not want to sign Mr. Perez out of hospital against medical advice and that she did not want him to go to [place] as she was very concerned about his ability to be able to tolerate such a long trip even in a medical transport and both the case manger and myself agreed with wife that he would not tolerate such trip well and would be against the best interest of Mr. Perez as my role as attending physician to travel to [place] at this time. I feel patient's best interest is to remain in [place] and get skilled care here in [place] to recover from his recent stroke and once strong enough to travel after his rehab that if wife wanted to then take him to [place] that would be a reasonable plan. Wife at that point stated clearly to myself and to Merced that she preferred for him to stay in [place] and return to [facility] SN[facility] unit as her first choice.  offered several times to provide list of other SNF facilities in this area that would be covered by patient's insurance and wife declined at this time. Daughter at end of conversation was not happy and was yelling that she felt her father all along had received substandard care here at [facility] starting when he came in for his stroke and including his stay in our SNF unit to current situation. Daughter stated she felt that he had received improper care. I explained to daughter that her father has been receiving proper care for his medical condition and that the care he is receiving is the standard of care. At that  point, Asha hung up and conversation ended. Once Asha was off the phone both  and myself explained to wife that she was the one whose decision that we would legally listen to not the daughter as wife is next of kin. Wife stated she wanted patient to stay in [place] and not be transferred so her wishes are to be carried out at this time. Unfortunately at this time, Mr. Perez is not medically competent to be making his own decisions. At this time, Mr. Perez will remain at our hospital with plans to get him to a SNF unit preferably [facility] that is covered by [employer] in our area as per wife's wishes.     DOMINGO CLEANING MD  Attending Staff Physician   [facility] Medicine  Pager: [phone]  Spectralink: [phone]

## 2017-07-08 NOTE — ASSESSMENT & PLAN NOTE
Controlled. Patient without evidence of decompensation presently. Plan to continue Metoprolol XL to treat. Will restart Lisinopril 5 mg po daily now that DASIA resolved.

## 2017-07-08 NOTE — PLAN OF CARE
KENAN called to the pt's room by Dr. De Jesus to assist in clarifying and answering questions for pt's wife Sirisha and dgt Asha. Upon entering the room the pt was sleeping in the bed, and his wife Sirisha was the only person present. She stated the pt's dgt Asha was back in [place], [place] and attempted to reach her by phone at this time, but she was not answering. Pt's wife also attempted to reach pt's son via phone (located in [place]) he was also unavailable.     Discussion began by Sirisha asking what the process would be to transfer her  to [facility] in [place], [place]. She stated the pt's dgt Asha is a nurse there and would like her father transferred there for rehab. Advised her that the facility she is referring to is an Acute [facility] Hospital and the pt would have to have a medical need to tx there that [facility] could not provide, the pt would need an accepting physician at that facility, and the pt's insurance would have to authorize the transfer if they wanted the insurance to cover his medical care in the state of [place]. Advised Sirisha that the pt is medically uncomplicated, is medically stable for d/c to a SNF LOC, which is what the PT/OT recs are for DCP, and the pt has a managed Medicare (PHN) that only works in the state [facility]. Also asked her how she planned on getting the pt to [place] and she stated the pt's dgt Asha was going to arrange an ambulance for the pt. Advised her this was not going to be a covered service through his insurance and would be an out of pocket expense. Sirisha stated that is what she understood from her previous conversations with JOEL yesterday, but her step-dgt Asha is telling her differently and she needs clarification.     Sirisha stated she has been speaking with an admitting coordinator in [place] named Shantal and asked if I would be willing to speak to her to clarify what the details with the transfer are. Dr. De Jesus and I both agreed to speak with Shantal, Sirisha called  "her and placed her on speaker phone.     Shantal stated she is the admissions coordinator for [facility] in [place], [place] and she has been working with the pt' dgt Asha to get her father transferred into their facility. Advised her that the pt does not have LTAC admission criteria, he does not have a medicare insurance plan that will work outside of the state of [place], nor will the insurance authorize transfer or transportation to a facility in another state when he can recv the care he needs here in [facility]. She stated this is not the information she understood from the pt's dgt. She stated she could use the pt's re-admittance into an acute care hospital after a failed stay in SNF as LTAC criteria, and she was under the impression the pt was in the process of converting back to straight Medicare. Advised her the pt did not have a failed stay at SNF he was re-admitted for a new acute medical condition. Advised her if the pt or his wife chooses to dis-enroll from [facility], straight Medicare will not go into effect until 8/1/2017, the pt is stable for d/c at this time, PT/OT recs are for SNF, and we will not keep him here through the insurance dis-enrollment process. Shantal stated she understood, encourage Sirisha to speak with her step-dgt Mary Ellen to work out other DCP options. At this time the phone conversation was ended.     1200- Pt's dgt Asha returned Sirisha's phone call, she was placed on speaker phone, Dr. De Jesus and myself introduced ourselves to her, and asked what we could clarify for her in regards to her request to transfer her father to [place]. Asha immediately stated she needed all the pt's medical records from his admission on 6/23, his SNF admission, and this admission faxed over to her hospital so they can prepare to transfer him there. She stated her father has received "sub par" medical care since he entered the [facility] system on 6/23 with a stroke, she is no longer going to tolerate this for her " father, and wants him immediately transferred to [facility] in [place]. Asha stated she has spoken with a physician that she works closely with, he has agreed to accept the pt for evaluation of his needs, and if he is deemed stable there, they will place him in LTAC at [facility]. Asha was advised by Dr. De Jesus and myself that the pt does not meet medical necessity for an acute to acute transfer, he is not able to travel that distance at this time by any means, and his insurance would not authorize the transfer or the transportation to [place]. Asha began to yell through the phone stating that if we were not going to to comply with her request for a transfer then d/c the pt and she will send a critical care ambulance with a physician, 3 nurses, and 2 paramedics from a company called [facility] out of [place] to pick her father up and bring him back to [place]. Dr. De Jesus advised her he will not be discharged without a safe DCP in place, she will not transfer him to another acute care hospital as it is not appropriate, and the pt is not in any condition to make a trip of that distance at this time. Dr. De Jesus advised Asha he is SNF LOC appropriate, and due to his insurance he will need to transfer to one here in [place]. Asha at this time began yelling to allow Sirisha to sign the pt out AMA, get his medical records, and she will send transport for him. Dr. De Jesus and myself advised Sirisha what signing out AMA meant and she would be putting the pt's health at risk by doing so. Asha continued to yell at us through the phone, so at this time I advised her that this was a one time courtesy phone call at the request of the pt's wife in attempt to offer explanation to her as to why the pt can not be transferred to [place]. I advised her in the state of [place] when the pt can not make decisions for himself the legal next of kin is the decision maker and in this case that would be her step-mother Sirisha. Asha was advised  if she has any questions regarding her father's medical condition or care she will need to obtain the information from the pt and/or Sirisha from this point forward. At this time Asha hung up the phone.     1230- Conversation continued with the pt's wife Sirisha, we advised her again of what the pt's d/c options are here in the [facility] area. Advised her if he has been recommended for SNF LOC, I offered to provide her with another PHN list if she did not want to return to [facility]. Sirisha stated she works at [employer] and [facility] is close to her office therefore she would like the pt to return there. Dr. De Jesus again advised her that she is the legal decision maker for her , she also advised her again what it meant legally to sign her  out AMA, and if anything were to happen to him after she signed the paperwork the responsibility would lie with her. Dr. De Jesus also reiterrated to her that a transfer and a d/c with the intention of taking him to another acute care hospital were not options. Dr. De Jesus did agree to either d/c the pt to a SNF, home with HH if they chose although it would not be her first choice, or AMA, which she is also not advising as it would not be in the best interest of the pt. Sirisha stated she works FT and is not able to take him home and care for him at this time. She also stated she will not be signing him out AMA as she does not want anything to happen to him. She stated after all of these conversations today she is in agreement he is not able to transport safely and comfortably at this time to [place] therefore she would like him to return to [facility] at time of d/c. Advised her that  new SNF and PHN referrals would be made on Monday and as soon as we get acceptance and auth he will be able to d/c back over there. Sirisha stated she understood and is in agreement with this plan at this time. Conversation with Sirisha ended at 1245.    1245- Advised 5th floor charge nurse Libra of the above details  with our conversation with the pt's wife and dgt Asha. Advised her the pt's dgt Asha is stating she is a nurse, and she is threatening to send a critical care transport team from [place] here to take her father out of here. Advised her all medical information and decisions are to be given to and discussed with the pt and his wife only. The pt and his wife can choose to communicate with the rest of the family members what medical information they would like to. Advised Libra that no referrals will be made to any facilities in [place] at this time, therefore if the pt's nurse, the 5th floor, or the [facility] are asked to fax clinical information on this pt to a [place] facility a CM/SW and Dr. De Jesus need to be immediately notified to confirm the proper medical release of information forms have been signed by either the pt or his legal next of kin and that they are aware of the requests being made. Libra stated she understood and would alert her staff.     1544- Recv'd a call from 5th floor charge nurse Libra, she stated the pt's wife would like to speak to me regarding transferring her  to a facility in [place]. Advised her I would be down as soon as I could I was working on other discharges at this time.     1545- Spoke to Dr. De Jesus, advised her of the above, she stated she is not going to transfer this pt to [place] for the reasons previously discussed, advised her I would go back and speak with her as soon as I can.     1718- Reecv'd a call from pt's nurse Nataly stating the pt's wife would like to sign the pt out AMA, advised her to call Dr. De Jesus.     1735- Spoke to the pt's wife at bedside, she stated at this time she has spoken with her  and it is his wish that he be transferred to [place] to be closer to and cared for by his dgt Asha. She stated she can not deal with this situation any longer, she has a dgt with chronic back pain at home she cares for as well as a recently disabled son from a GSW. She stated  Asha has arranged for the ambulance company to [/u her father at 1200 (Noon) tomorrow and they will take him to her hospital in [place] for evaluation. Advised pt's wife Sirisha I would call Dr. De Jesus and let her know all of these things and I will come back and speak with her. She asked I let Dr. De Jesus know that the doctor in [place] would like to speak with her in regards to the transfer.     1745- Spoke to Dr. De Jesus, advised her of the above, she stated once the pt's wife signs the AMA form they will need to leave the facility immediately. She stated she is not willing to speak with any physician in [place] as she is not transferring this pt and she is not condoning the wife signing him out AMA and transporting him to [place]. Advised her I would let the wife know this information and call her back.     1800- Spoke with the pt and his wife at bedside. Assessed the pt's mental status, pt was able to tell me his name and his wife's name, but he was not able to tell me where he is located, what today is, what the month is, or the time of day it is. Advised pt and his wife Sirisha of the information regarding my recent conversation with Dr. De Jesus. Sirisha stated since the ambulance ([facility]- [phone]) will not be here till Noon tomorrow she will be keeping her  here through the night to recv care and will sign the AMA form upon her arrival. She stated she is not capable of taking him home and caring for him through the night. Advised her I would let Dr. De Jesus know this and how she would like to proceed.     1810- Spoke with Dr. De Jesus, she requested at this point I involve my director ron the house supervisor to see if [facility] allows ambulance services from other [facility] to remove pt's under AMA. Advised her I would contact my director and call her back.    1815- Message sent to  director Meghna Zelaya, awaiting a reply.     1837- Contacted house supervisor Rach, advised her of the above  situation and that I have reached out to my director. She stated she has never had this situation in the past, she stated she would like to wait and see what the  director has to say before reaching out to her admins on call. Advised her I would call her back once I have heard from my boss.     1855- Spoke with  director Meghna Zelaya, she stated she conferred with legal, and it is OK for the ambulance co, [facility] from [place] to enter [facility] and remove the pt at the family's request under AMA. She stated since the pt is leaving against medical advice the family will need to have the medical records requested through the destination and our medical records department.     1925- [facility] service in [phone], spoke to Emile in dispatch, confirmed they will be picking this pt up tomorrow at Noon, and he stated they are. Asked if he and his company are aware this is an AMA situation, this is NOT a facility transfer, and no medical records will be released. He stated he was going to have to discuss this with his supervisor as he was the one who took this call and either he or his supervisor will call me back.     1945- Spoke with Dr. De Jesus, advised her of the above information from the CM director and the .     1950- Spoke to pt and his wife at bedside, advised her that [facility] will allow [facility] to come and take the pt tomorrow at Noon once she signs the AMA form. Advised her he will leave here with nothing, and when they arrive at their destination in [place] they will need to have the facility request the pt's medical records through our medical records dept, she stated she understood. She stated she would be travelling with the pt to [place] in the ambulance. Pt's nurse at bedside, up-dated her on the plan for the pt to leave AMA tomorrow around Noon via ambulance to an unknown medical facility arranged by the pt's family. Advised this is NOT a d/c or facility transfer  and the AMA form will need to be completed. Advised pt's nurse pt will cont to recv care here until the time she signs the AMA form and at which time the pt will need to leave the facility. Sirisha stated she understood, thanked me for all of my efforts, and hugged me in appreciation. Advised she can change her mind at any time and we would be happy to cont to care for her  here and assist with his DCP needs. She stated she has to work FT, take care of both her dgt and son, and she knows the pt's children will care for him in [place] until she can resume caring for him.

## 2017-07-09 VITALS
RESPIRATION RATE: 18 BRPM | OXYGEN SATURATION: 96 % | BODY MASS INDEX: 27.85 KG/M2 | TEMPERATURE: 98 F | DIASTOLIC BLOOD PRESSURE: 82 MMHG | WEIGHT: 188 LBS | HEIGHT: 69 IN | HEART RATE: 82 BPM | SYSTOLIC BLOOD PRESSURE: 139 MMHG

## 2017-07-09 LAB
ANION GAP SERPL CALC-SCNC: 9 MMOL/L
BUN SERPL-MCNC: 31 MG/DL
CALCIUM SERPL-MCNC: 9.5 MG/DL
CHLORIDE SERPL-SCNC: 104 MMOL/L
CO2 SERPL-SCNC: 27 MMOL/L
CREAT SERPL-MCNC: 1.2 MG/DL
EST. GFR  (AFRICAN AMERICAN): >60 ML/MIN/1.73 M^2
EST. GFR  (NON AFRICAN AMERICAN): 55.2 ML/MIN/1.73 M^2
GLUCOSE SERPL-MCNC: 156 MG/DL
POCT GLUCOSE: 143 MG/DL (ref 70–110)
POCT GLUCOSE: 199 MG/DL (ref 70–110)
POTASSIUM SERPL-SCNC: 4.1 MMOL/L
SODIUM SERPL-SCNC: 140 MMOL/L

## 2017-07-09 PROCEDURE — 97112 NEUROMUSCULAR REEDUCATION: CPT

## 2017-07-09 PROCEDURE — 97535 SELF CARE MNGMENT TRAINING: CPT

## 2017-07-09 PROCEDURE — 25000003 PHARM REV CODE 250: Performed by: INTERNAL MEDICINE

## 2017-07-09 PROCEDURE — 36415 COLL VENOUS BLD VENIPUNCTURE: CPT

## 2017-07-09 PROCEDURE — 97530 THERAPEUTIC ACTIVITIES: CPT

## 2017-07-09 PROCEDURE — 99239 HOSP IP/OBS DSCHRG MGMT >30: CPT | Mod: ,,, | Performed by: INTERNAL MEDICINE

## 2017-07-09 PROCEDURE — 25000003 PHARM REV CODE 250: Performed by: HOSPITALIST

## 2017-07-09 PROCEDURE — 80048 BASIC METABOLIC PNL TOTAL CA: CPT

## 2017-07-09 PROCEDURE — 63600175 PHARM REV CODE 636 W HCPCS: Performed by: INTERNAL MEDICINE

## 2017-07-09 RX ADMIN — INSULIN DETEMIR 68 UNITS: 100 INJECTION, SOLUTION SUBCUTANEOUS at 08:07

## 2017-07-09 RX ADMIN — DONEPEZIL HYDROCHLORIDE 10 MG: 10 TABLET, FILM COATED ORAL at 08:07

## 2017-07-09 RX ADMIN — INSULIN ASPART 13 UNITS: 100 INJECTION, SOLUTION INTRAVENOUS; SUBCUTANEOUS at 08:07

## 2017-07-09 RX ADMIN — LISINOPRIL 5 MG: 5 TABLET ORAL at 08:07

## 2017-07-09 RX ADMIN — ATORVASTATIN CALCIUM 40 MG: 20 TABLET, FILM COATED ORAL at 08:07

## 2017-07-09 RX ADMIN — AMLODIPINE BESYLATE 10 MG: 10 TABLET ORAL at 08:07

## 2017-07-09 RX ADMIN — MEMANTINE HYDROCHLORIDE 10 MG: 10 TABLET ORAL at 08:07

## 2017-07-09 RX ADMIN — METOPROLOL SUCCINATE 25 MG: 25 TABLET, EXTENDED RELEASE ORAL at 08:07

## 2017-07-09 RX ADMIN — ASPIRIN 81 MG CHEWABLE TABLET 81 MG: 81 TABLET CHEWABLE at 08:07

## 2017-07-09 NOTE — ASSESSMENT & PLAN NOTE
Type 2 diabetes mellitus with diabetic polyneuropathy, with long-term current use of insulin  Type 2 diabetes mellitus with stage 3 chronic kidney disease, with long-term current use of insulin  Patient with several episodes of hypoglycemia on 7/7 to as low as 42 and insulin dosages decreased and patient with no further hypoglycemia since that time. Patient eating 50% of meals. HgA1C 9.8% and not at goal on 6/22/2017. Patient to resume home dosage of his insulin on discharge and wife instructed to give insulin at home as she was doing prior to this recent hospitalization but no new scripts were given to wife as she signed him out of hospital against my medical advice. Patient at baseline renal function with creatinine 1.1-1.2.

## 2017-07-09 NOTE — PT/OT/SLP PROGRESS
"Occupational Therapy  Treatment    Emmett Perez   MRN: [id number]   Admitting Diagnosis: Acute encephalopathy    OT Date of Treatment: 07/09/17   OT Start Time: 0722  OT Stop Time: 0802  OT Total Time (min): 40 min    Billable Minutes:  Self Care/Home Management 30 and Neuromuscular Re-education 10    General Precautions: Standard, fall, seizure  Orthopedic Precautions: N/A  Braces: N/A    Do you have any cultural, spiritual, Mosque conflicts, given your current situation?: None     Subjective:  Communicated with RN prior to session.  "I don't know who that is in the room with me" -pt stated referring to spouse  Pain/Comfort  Pain Rating 1: 0/10  Pain Rating Post-Intervention 1:  (Pt did not quantify)    Objective:     Functional Mobility:  Bed Mobility:  Rolling/Turning to Left: Maximum assistance, With side rail (HOB elevated)  Scooting/Bridging: Maximum Assistance, With side rail (to EOB)  Supine to Sit: Maximum Assistance, WIth side rail (HOB elevated; max A for trunk support and transitioning legs off of bed)  Sit to Supine: With side rail, Maximum Assistance (HOB elevated; max A to elevated LE and for controlled trunk lowering)    Transfers:   Sit <> Stand Assistance: Activity did not occur due to decreased safety and arousal    Activities of Daily Living:    UE Dressing Level of Assistance: Maximal assistance (Doffed/donned front gown while seated EOB. Max A and VCs to maintain upright posture. Pt able to thread B UE through sleeves, required A to pull B sleeve over shoulders)  LE Dressing Level of Assistance: Total assistance (for safety on this date due to decreased postural control)  Grooming Position: Seated (and supine)  Grooming Level of Assistance: Maximal assistance (Pt required Minto Max A to wash face while supine. Pt required max A for postural control to wash face and mod A to complete oral care using toothette while seated EOB)              Balance:   -Pt performed static and dynamic sitting EOB " "for ~30 minutes with CGA<>max A to maintain postural control. Pt required constant VCs for upright posture and to utilize BUE as external support. Pt required [place] A for facilitation and placement of BUE and constant VCs.     Therapeutic Activities and Exercises:  -Pt oriented only to self, OT provided orientation information.  -Pt drowsy throughout session requiring constant VCs to open eyes and attend to task.  -Pt and spouse educated on importance and safety with EOB self-care tasks.    AM-PAC 6 CLICK ADL   How much help from another person does this patient currently need?   1 = Unable, Total/Dependent Assistance  2 = A lot, Maximum/Moderate Assistance  3 = A little, Minimum/Contact Guard/Supervision  4 = None, Modified [place]/Independent    Putting on and taking off regular lower body clothing? : 1  Bathing (including washing, rinsing, drying)?: 1  Toileting, which includes using toilet, bedpan, or urinal? : 1  Putting on and taking off regular upper body clothing?: 2  Taking care of personal grooming such as brushing teeth?: 2  Eating meals?: 2  Total Score: 9     AM-PAC Raw Score CMS "G-Code Modifier Level of Impairment Assistance   6 % Total / Unable   7 - 8 CM 80 - 100% Maximal Assist   9-13 CL 60 - 80% Moderate Assist   14 - 19 CK 40 - 60% Moderate Assist   20 - 22 CJ 20 - 40% Minimal Assist   23 CI 1-20% SBA / CGA   24 CH 0% Independent/ Mod I       Patient left supine with all lines intact, call button in reach, RN notified and spouse present    ASSESSMENT:  Emmett Perez is a 84 y.o. male with a medical diagnosis of Acute encephalopathy. Pt tolerated session fairly, however, pt's decreased arousal limited session to EOB ax on this date. Due to lethargy and decreased postural control no OOB ax performed for safety. Pt's drowsiness limited pt's ability to attend to self-care tasks, causing pt to require constant VCs and increased Qawalangin A. Decreased postural control and L-sided hemiparesis " limited pt's ability to complete grooming and dressing tasks while seated EOB, requiring increased physical A to complete. Overall, pt presents with impaired functional mobility, decreased postural control, UE functioning, arousal, functional endurance and safety awareness during ADL tasks. Pt would benefit from continued OT services for neuromuscular re-education, increased safety and to maximize (I) during ADL tasks.     Rehab identified problem list/impairments: Rehab identified problem list/impairments: weakness, impaired endurance, impaired sensation, impaired self care skills, impaired functional mobilty, gait instability, impaired balance, decreased lower extremity function, decreased safety awareness, pain, decreased upper extremity function, abnormal tone, decreased ROM    Rehab potential is good.    Activity tolerance: Fair    Discharge recommendations: Discharge Facility/Level Of Care Needs: nursing facility, skilled     Barriers to discharge: Barriers to Discharge: Decreased caregiver support    Equipment recommendations:  (TBD pending progress)     GOALS:    Occupational Therapy Goals        Problem: Occupational Therapy Goal    Goal Priority Disciplines Outcome Interventions   Occupational Therapy Goal     OT, PT/OT Ongoing (interventions implemented as appropriate)    Description:  Goals to be met by: 7/20/17     Patient will increase functional independence with ADLs by performing:    Feeding with Minimal Assistance.  UE Dressing with Minimal Assistance.  Grooming while EOB with Minimal Assistance.  Sitting at edge of bed x2 minutes with Contact Guard Assistance.  Rolling to Right, Left with Minimal Assistance.   Supine to sit with Minimal Assistance.  Toilet transfer to bedside commode with Moderate Assistance.  Upper extremity AAROM/PROM exercise program x10 reps per handout and visual demonstration, with assistance as needed.                      Plan:  Patient to be seen 5 x/week to address the  above listed problems via self-care/home management, community/work re-entry, therapeutic activities, therapeutic exercises, neuromuscular re-education  Plan of Care expires: 08/05/17  Plan of Care reviewed with: patient, spouse       RAN Murillo  07/09/2017

## 2017-07-09 NOTE — ASSESSMENT & PLAN NOTE
Controlled. Patient without evidence of decompensation presently. Patient to continue prior home meds of continue Metoprolol XL and Lisinopril 5 mg po daily to treat. No new script given due to AMA discharge.

## 2017-07-09 NOTE — ASSESSMENT & PLAN NOTE
Patient's blood pressure improved. Goal for blood pressure is SBP < 140 and DBP < 90 as patient > or = 60 years of age and patient has advanced chronic kidney disease based on JNC 8 guidelines. Patient to continue home meds of Amlodipine and Toprol XL and Lisinopril 5 mg po daily that patient was on prior to this admit. No new script given due to AMA discharge.

## 2017-07-09 NOTE — ASSESSMENT & PLAN NOTE
Improved. Likely due to reduced uric acid clearance in CKD. No dietary risk factors. Given advanced age and insulin-dependent diabetes cannot use NSAIDS to treat; Patient treated for acute gout flare with short course of Prednisone 40mg daily x 5 days while in hospital with good clinical improvement and joint effusion in left knee resolved and pain in knee much improved at time of release from hospital. Patient discharged on no meds for further treatment as signed out AMA.

## 2017-07-09 NOTE — PLAN OF CARE
Problem: Occupational Therapy Goal  Goal: Occupational Therapy Goal  Goals to be met by: 7/20/17     Patient will increase functional independence with ADLs by performing:    Feeding with Minimal Assistance.  UE Dressing with Minimal Assistance.  Grooming while EOB with Minimal Assistance.  Sitting at edge of bed x2 minutes with Contact Guard Assistance.  Rolling to Right, Left with Minimal Assistance.   Supine to sit with Minimal Assistance.  Toilet transfer to bedside commode with Moderate Assistance.  Upper extremity AAROM/PROM exercise program x10 reps per handout and visual demonstration, with assistance as needed.     Outcome: Ongoing (interventions implemented as appropriate)  Goals remain appropriate.  RAN Murillo  7/9/2017

## 2017-07-09 NOTE — ASSESSMENT & PLAN NOTE
Patient with baseline dementia and unable to make his own medical decisions. Patient likely more susceptible to effects of sedating medications.  Avoid tramadol in future. Patient to resume his home medication of Namenda and Aricept to treat dementia as was taking at home previously. No new script given due to AMA discharge.

## 2017-07-09 NOTE — SIGNIFICANT EVENT
Hospital Medicine     Called into room by wife, Sirisha Perez at 11:20 am this morning 7/9/2017. Mrs. Perez informed me she wanted to take patient home with her today. Apparently, the ambulance company in [place] would not come to pick patient up at the hospital since this was not a facility transfer but an AMA situation. I explained to wife it was against the best interests of the patient to be released from the hospital but Mrs. Perez was insistent on taking patient home with her today. Mrs. Perez was vague what her plans were regarding patient besides taking him home and likely having daughter make arrangements from there to get him to [place] but unclear of what future plans where. I informed Mrs. Perez that it was against my medical advice to take Mr. Perez home and that I would be happy to keep patient in hospital until time we could arrange for placement and discharge to a skilled facility in our local area that would be covered by his health insurance as recommended by our physical and occupational therapy. Mrs. Perez understood but still wanted to take patient to their home. I had Mrs. Perez sign patient out of hospital against my medical advice. Patient's nurse at bedside, Milana Barlow witnessed Mrs. Perez signing AMA form.   I explained in detail to Mrs. Perez as well to her  that it was not safe for patient to be taken out of hospital as patient has a significant neurologic deficit from his recent stroke and Mr. Perez has significant weakness to the left arm and leg area. Patient is total care and needs 24 hour supervision and patient is an extremely high fall risk. I explained to Mrs. Perez that the patient needed aggressive inpatient physical, occupational and speech therapy to give Mr. Perez the best chance to have any functional recovery from his stroke and that since she was taking Mr. Perez out of the hospital against my advice that no outpatient therapy would be arranged for the  patient at this time as did not have time to make these arrangements and wife expressed she was not interested in us making those arrangements for her  and expressed to me that she would likely be taking patient to [place] closer to his daughter. I explained to wife that there was a good chance without any further therapy that Mr. Perez was unlikely to ever regain function to his left arm or leg or have the ability to walk again which would likely result in patient being bound to a wheelchair and dependent on 24 hour care. Patient also at very high risk for falls and injuries as patient unable to stand on his own due to his neurologic deficit which could lead to bone fractures or brain injury or bed sores. No new prescriptions for medications given to the patient or wife on discharge as left hospital without any scripts.      The patient was not competent to make his own decision due to his dementia but the patient's wife is competent and understands the risks of patient leaving, including permanent disability and/or death, and had the opportunity to ask questions about her 's condition. The wife has been informed that she could return patient to our facility for care at any time. Wife signed AMA inform and she is the legal next of kin.     DOMINGO CLEANING MD  Attending Staff Physician   [facility]  Pager: [phone]  Spectralink: [phone]

## 2017-07-09 NOTE — ASSESSMENT & PLAN NOTE
PT/OT resumed for continued rehab after recent stroke now that acute gout flare to left knee is better. Speech therapy re-evaluated patient on 7/6 and okay to advance diet from pureed diet with thin liquids to dental soft diet with thin liquids. Patient will need SNF placement on discharge as recommended by both PT and OT and ST.   Wife signed patient out against medical advice and patient is a high fall risk as has significant deficit to left side with only 1/5 strength to left arm and 2/5 strength to left leg. Patient is total care and needs 24 hours supervision as patient has poor awareness of his deficit and impulsive and that is likely related to his dementia and lack of understanding of his deficit. Patient is a very high fall risk and wife is aware that he needs additional aggressive rehab if he is to try and regain any function to the left side of his body but since he was signed out against medical advice no arrangements made for any outpatient rehab or medical equipment.

## 2017-07-09 NOTE — PROGRESS NOTES
Pt and wife both advised by Dr De Jesus against leaving AMA. Pt wife signed AMA form. Pt waiting for transport. Wife at bedside.

## 2017-07-09 NOTE — ASSESSMENT & PLAN NOTE
Resolved. Patient with no further somnolence and much more alert but confused and has baseline dementia and according to wife back to baseline cognitive function. Somnolence likely due to delayed clearance of Tramadol in setting of advanced age and CKD that has now cleared his system.  Avoid use of Tramadol in future and just use Tylenol for pain. No further neuro imaging needed at this time as clinically back to neurologic baseline that patient had at time of recent stroke. No evidence to suggest metabolic cause as BUN only minimally elevated and sodium and calcium normal. Patient with no fever, tachycardia or leukocytosis to suggest infectious cause.

## 2017-07-09 NOTE — SUBJECTIVE & OBJECTIVE
Interval History: Patient more alert this am. Patient still with significant weakness to left arm and leg related to his recent stroke but no new neurologic deficit. Patient is pleasantly confused and unable to make any medical decisions for himself. Patient's wife at bedside who is next of kin wants to take patient out of the hospital against medial advice and signed AMA form and patient taken home by wife.     Review of Systems   Constitutional: Negative for chills and fever.   Respiratory: Negative for cough and shortness of breath.    Cardiovascular: Negative for chest pain.   Gastrointestinal: Negative for abdominal pain, diarrhea, nausea and vomiting.   Genitourinary: Negative for flank pain.   Musculoskeletal: Positive for myalgias (Left knee pain).   Skin: Negative for rash.   Neurological: Negative for dizziness and light-headedness.   Psychiatric/Behavioral: Positive for confusion (Mild). Negative for hallucinations.     Objective:     Vital Signs (Most Recent):  Temp: 97.5 °F (36.4 °C) (07/09/17 1201)  Pulse: 82 (07/09/17 1201)  Resp: 18 (07/09/17 1201)  BP: 139/82 (07/09/17 1201)  SpO2: 96 % (07/09/17 1201) Vital Signs (24h Range):  Temp:  [96.9 °F (36.1 °C)-98.1 °F (36.7 °C)] 97.5 °F (36.4 °C)  Pulse:  [63-88] 82  Resp:  [16-18] 18  SpO2:  [95 %-98 %] 96 %  BP: (139-179)/(79-98) 139/82     Weight: 85.3 kg (188 lb)  Body mass index is 27.76 kg/m².    Intake/Output Summary (Last 24 hours) at 07/09/17 1355  Last data filed at 07/09/17 0244   Gross per 24 hour   Intake              250 ml   Output                0 ml   Net              250 ml      Physical Exam   Constitutional: He appears well-developed and well-nourished. No distress.   HENT:   Mouth/Throat: Oropharynx is clear and moist.   Eyes: Conjunctivae are normal.   Neck: No JVD present.   Cardiovascular: Normal rate, regular rhythm and normal heart sounds.  Exam reveals no gallop and no friction rub.    No murmur heard.  Pulmonary/Chest: Effort  normal and breath sounds normal. He has no wheezes. He has no rales.   Abdominal: Soft. Bowel sounds are normal. He exhibits no distension. There is no tenderness.   Musculoskeletal: He exhibits no edema.   Pain on ROM to left knee (mainly on flexion of left knee)   Neurological: He is alert. He is not disoriented.   Oriented to person and place but not time   Skin: No rash noted. No erythema.   Psychiatric: He has a normal mood and affect. His behavior is normal.       Significant Labs:   CBC:   Recent Labs  Lab 07/08/17  0452   WBC 11.48   HGB 13.6*   HCT 40.4        CMP:   Recent Labs  Lab 07/08/17  0452 07/09/17  0337    140   K 3.9 4.1    104   CO2 26 27   * 156*   BUN 28* 31*   CREATININE 1.1 1.2   CALCIUM 9.6 9.5   ANIONGAP 8 9   EGFRNONAA >60.0 55.2*     POCT Glucose:   Recent Labs  Lab 07/08/17  1730 07/08/17  2215 07/09/17  0821   POCTGLUCOSE 214* 199* 143*       Significant Imaging: I have reviewed all pertinent imaging results/findings within the past 24 hours.

## 2017-07-09 NOTE — PROGRESS NOTES
Nurse called in to pt room by pt spouse. Spouse requests to sign AMA forms to leave. Dr De Jesus notified.

## 2017-07-09 NOTE — CARE UPDATE
Called to patient's room by escort. Patient sitting on ground. Wife attempted to get patient back to  wheel chair and had to lower patient to ground. Patient assisted back to chair by staff. Dr. De Jesus aware. No injuries noted.

## 2017-07-09 NOTE — PROGRESS NOTES
[facility] Hospital Medicine  Progress Note    Patient Name: Emmett Perez  MRN: [id number]  Patient Class: IP- Inpatient   Admission Date: 7/3/2017  Length of Stay: 5 days  Attending Physician: No att. providers found  Primary Care Provider: Eb Matthews MD    [facility] Medicine Team: [facility] K Unique De Jesus MD    Subjective:     Principal Problem:Acute encephalopathy    HPI:  84 y.o. 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 History obtained from family at bedside due to patient's somnolence. The night before presentation he complained of left knee pain and was given a dose of Tramadol, which he had never had before.  Shortly after getting that he became confused and somnolent, not recognizing family.  He slept all through the day and could only be roused to drink some fluids but would not eat or speak, nor participate in therapy. In the ED he was noted to have a warm, swollen left knee upon when arthrocentesis was performed and results consistent with acute gout. He has no prior known history of gout, does not regularly eat shellfish, nor does he drink beer.      Hospital Course:  Patient placed on observation for acute encephalopathy felt due to Tramadol. Patient did have CT scan of head done on 7/3 that showed evolving right thalamic infarction but no detrimental interval change from prior examination. Patient was started on Prednisone 40 mg po daily and Colchicine 0.3 mg po daily for treatment of acute gout to left knee. Patient was placed on just Tylenol alone for pain. Patient remained  somnolent on 7/4 and plan is for MRI imaging of brain if no clinical improvement by 7/5. Patient's labs reviewed and no evidence to suggest infection as cause. Patient with no evidence to suggest seizure as cause. Electrolytes and renal function stable so metabolic encephalopathy not felt to be cause of somnolence. Patient on 7/5 more alert and responsive but still having some periodic episodes of somnolence but improving so no further neuro imaging done as felt likely Tramadol as cause. Patient was noted on 7/5 to have increased Cr from 1.4 to 1.7. Patient started on IVF's and Lisinopril held. Urine studies consistent with prerenal azotemia and patient responded to IVF's with improvement in creatinine to 1.3 on 7/6. IVF's discontinued on 7/6. PT/OT evaluated patient and recommends SNF. Patient on 7/6 much more alert and responsive and family reports patient is back to cognitive baseline. Social work and  working on SNF placement as patient is medically stable for discharge to a skilled nursing facility. Wife decided she wanted patient to return to [facility] so consult placed on 7/7. Patient restarted on Lisinopril for his heart failure and HTN as patient's BP was getting elevated with -180 off of Lisinopril and DASIA had resolved. Patient's daughter and wife wishes for patient to go to [place] for skilled care and continued hospitalization but patient had [employer] insurance of [place] that does not cover care in [place]. Family was very demanding and wanting patient in [place] but patient was not medically ready for discharge to a home setting as both PT and OT recommending patient needed further inpatient rehab at SNF level and not safe for home discharge. Patient's wife decided against my medical advice to take patient home and patient's wife, Sirisha signed patient out of the hospital against medical advice on 7/9/2017. No prescriptions given to wife as she signed him out of hospital against  my advice or was any outpatient rehab arranged or home health due to AMA status.    Interval History: Patient more alert this am. Patient still with significant weakness to left arm and leg related to his recent stroke but no new neurologic deficit. Patient is pleasantly confused and unable to make any medical decisions for himself. Patient's wife at bedside who is next of kin wants to take patient out of the hospital against medial advice and signed AMA form and patient taken home by wife.     Review of Systems   Constitutional: Negative for chills and fever.   Respiratory: Negative for cough and shortness of breath.    Cardiovascular: Negative for chest pain.   Gastrointestinal: Negative for abdominal pain, diarrhea, nausea and vomiting.   Genitourinary: Negative for flank pain.   Musculoskeletal: Positive for myalgias (Left knee pain).   Skin: Negative for rash.   Neurological: Negative for dizziness and light-headedness.   Psychiatric/Behavioral: Positive for confusion (Mild). Negative for hallucinations.     Objective:     Vital Signs (Most Recent):  Temp: 97.5 °F (36.4 °C) (07/09/17 1201)  Pulse: 82 (07/09/17 1201)  Resp: 18 (07/09/17 1201)  BP: 139/82 (07/09/17 1201)  SpO2: 96 % (07/09/17 1201) Vital Signs (24h Range):  Temp:  [96.9 °F (36.1 °C)-98.1 °F (36.7 °C)] 97.5 °F (36.4 °C)  Pulse:  [63-88] 82  Resp:  [16-18] 18  SpO2:  [95 %-98 %] 96 %  BP: (139-179)/(79-98) 139/82     Weight: 85.3 kg (188 lb)  Body mass index is 27.76 kg/m².    Intake/Output Summary (Last 24 hours) at 07/09/17 1355  Last data filed at 07/09/17 0244   Gross per 24 hour   Intake              250 ml   Output                0 ml   Net              250 ml      Physical Exam   Constitutional: He appears well-developed and well-nourished. No distress.   HENT:   Mouth/Throat: Oropharynx is clear and moist.   Eyes: Conjunctivae are normal.   Neck: No JVD present.   Cardiovascular: Normal rate, regular rhythm and normal heart sounds.  Exam  reveals no gallop and no friction rub.    No murmur heard.  Pulmonary/Chest: Effort normal and breath sounds normal. He has no wheezes. He has no rales.   Abdominal: Soft. Bowel sounds are normal. He exhibits no distension. There is no tenderness.   Musculoskeletal: He exhibits no edema.   Pain on ROM to left knee (mainly on flexion of left knee)   Neurological: He is alert. He is not disoriented.   Oriented to person and place but not time   Skin: No rash noted. No erythema.   Psychiatric: He has a normal mood and affect. His behavior is normal.       Significant Labs:   CBC:   Recent Labs  Lab 07/08/17  0452   WBC 11.48   HGB 13.6*   HCT 40.4        CMP:   Recent Labs  Lab 07/08/17  0452 07/09/17  0337    140   K 3.9 4.1    104   CO2 26 27   * 156*   BUN 28* 31*   CREATININE 1.1 1.2   CALCIUM 9.6 9.5   ANIONGAP 8 9   EGFRNONAA >60.0 55.2*     POCT Glucose:   Recent Labs  Lab 07/08/17  1730 07/08/17  2215 07/09/17  0821   POCTGLUCOSE 214* 199* 143*       Significant Imaging: I have reviewed all pertinent imaging results/findings within the past 24 hours.    Assessment/Plan:      Type 2 diabetes mellitus with hyperglycemia, with long-term current use of insulin    Type 2 diabetes mellitus with diabetic polyneuropathy, with long-term current use of insulin  Type 2 diabetes mellitus with stage 3 chronic kidney disease, with long-term current use of insulin  Patient with several episodes of hypoglycemia on 7/7 to as low as 42 and insulin dosages decreased and patient with no further hypoglycemia since that time. Patient eating 50% of meals. HgA1C 9.8% and not at goal on 6/22/2017. Patient to resume home dosage of his insulin on discharge and wife instructed to give insulin at home as she was doing prior to this recent hospitalization but no new scripts were given to wife as she signed him out of hospital against my medical advice. Patient at baseline renal function with creatinine 1.1-1.2.          * Acute encephalopathy    Resolved. Patient with no further somnolence and much more alert but confused and has baseline dementia and according to wife back to baseline cognitive function. Somnolence likely due to delayed clearance of Tramadol in setting of advanced age and CKD that has now cleared his system.  Avoid use of Tramadol in future and just use Tylenol for pain. No further neuro imaging needed at this time as clinically back to neurologic baseline that patient had at time of recent stroke. No evidence to suggest metabolic cause as BUN only minimally elevated and sodium and calcium normal. Patient with no fever, tachycardia or leukocytosis to suggest infectious cause.           Acute gout due to renal impairment involving left knee    Improved. Likely due to reduced uric acid clearance in CKD. No dietary risk factors. Given advanced age and insulin-dependent diabetes cannot use NSAIDS to treat; Patient treated for acute gout flare with short course of Prednisone 40mg daily x 5 days while in hospital with good clinical improvement and joint effusion in left knee resolved and pain in knee much improved at time of release from hospital. Patient discharged on no meds for further treatment as signed out AMA.           Late onset Alzheimer's disease without behavioral disturbance    Patient with baseline dementia and unable to make his own medical decisions. Patient likely more susceptible to effects of sedating medications.  Avoid tramadol in future. Patient to resume his home medication of Namenda and Aricept to treat dementia as was taking at home previously. No new script given due to AMA discharge.           Essential hypertension    Patient's blood pressure improved. Goal for blood pressure is SBP < 140 and DBP < 90 as patient > or = 60 years of age and patient has advanced chronic kidney disease based on JNC 8 guidelines. Patient to continue home meds of Amlodipine and Toprol XL and Lisinopril 5 mg  po daily that patient was on prior to this admit. No new script given due to AMA discharge.           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 Speech therapy re-evaluated patient on 7/6 and okay to advance diet from pureed diet with thin liquids to dental soft diet with thin liquids. Patient will need SNF placement on discharge as recommended by both PT and OT and ST.   Wife signed patient out against medical advice and patient is a high fall risk as has significant deficit to left side with only 1/5 strength to left arm and 2/5 strength to left leg. Patient is total care and needs 24 hours supervision as patient has poor awareness of his deficit and impulsive and that is likely related to his dementia and lack of understanding of his deficit. Patient is a very high fall risk and wife is aware that he needs additional aggressive rehab if he is to try and regain any function to the left side of his body but since he was signed out against medical advice no arrangements made for any outpatient rehab or medical equipment.           Chronic combined systolic and diastolic heart failure    Controlled. Patient without evidence of decompensation presently. Patient to continue prior home meds of continue Metoprolol XL and Lisinopril 5 mg po daily to treat. No new script given due to AMA discharge.             Type 2 diabetes mellitus with stage 3 chronic kidney disease, with long-term current use of insulin    Patient is at baseline renal function and controlled at present. Will avoid any nephrotoxic agents such as NSAIDS, IV contrast or aminoglycosides unless necessary while hospitalized and will renally adjust medications based on patient's creatinine clearance. Strict I+Os. Monitor daily BMP to monitor renal function.             Type 2 diabetes mellitus with diabetic  polyneuropathy, with long-term current use of insulin                VTE Risk Mitigation         Ordered     Medium Risk of VTE  Once      07/04/17 0005        DISCHARGE PLANNING:  Future Appointments  Date Time Provider Department Center   7/25/2017 11:00 AM Chayo Barragan MD [facility]       Discharge Disposition: Left Against Medical Advice    TIME SPENT: I spent 36 minutes evaluating, treating, counseling, and coordinating care for the patient for discharge.       Unique De Jesus MD  Department of Hospital Medicine   [facility]

## 2017-07-09 NOTE — PLAN OF CARE
Problem: Diabetes, Type 2 (Adult)  Goal: Signs and Symptoms of Listed Potential Problems Will be Absent, Minimized or Managed (Diabetes, Type 2)  Signs and symptoms of listed potential problems will be absent, minimized or managed by discharge/transition of care (reference Diabetes, Type 2 (Adult) CPG).   Outcome: Ongoing (interventions implemented as appropriate)  Pt is progressing with plan of care. Frequent rounds made to assess pain and safety. Pt's pain controlled with pain medication ordered at this time. Bed locked and at lowest position. Side rails up x 2. Call light within reach. Will continue to monitor.

## 2017-07-09 NOTE — DISCHARGE SUMMARY
[facility] Hospital Medicine  Discharge Summary      Patient Name: Emmett Perez  MRN: [id number]  Admission Date: 7/3/2017  Hospital Length of Stay: 5 days  Discharge Date and Time: 7/9/2017 12:01 PM (Patient left against medical advice)  Attending Physician: Unique De Jesus MD  Discharging Provider: Unique De Jesus MD  Primary Care Provider: Eb Matthews MD  [facility] Medicine Team: [facility] HOSP MED  Unique De Jesus MD    HPI:   84 y.o. 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 History obtained from family at bedside due to patient's somnolence. The night before presentation he complained of left knee pain and was given a dose of Tramadol, which he had never had before.  Shortly after getting that he became confused and somnolent, not recognizing family.  He slept all through the day and could only be roused to drink some fluids but would not eat or speak, nor participate in therapy. In the ED he was noted to have a warm, swollen left knee upon when arthrocentesis was performed and results consistent with acute gout. He has no prior known history of gout, does not regularly eat shellfish, nor does he drink beer.      * No surgery found *      Indwelling Lines/Drains at time of discharge:   Lines/Drains/Airways          No matching active lines, drains, or airways        Hospital Course:   Patient placed on observation for acute encephalopathy felt due to Tramadol. Patient did have CT scan of head done on 7/3 that showed evolving right thalamic infarction but no detrimental interval change from prior  examination. Patient was started on Prednisone 40 mg po daily and Colchicine 0.3 mg po daily for treatment of acute gout to left knee. Patient was placed on just Tylenol alone for pain. Patient remained somnolent on 7/4 and plan is for MRI imaging of brain if no clinical improvement by 7/5. Patient's labs reviewed and no evidence to suggest infection as cause. Patient with no evidence to suggest seizure as cause. Electrolytes and renal function stable so metabolic encephalopathy not felt to be cause of somnolence. Patient on 7/5 more alert and responsive but still having some periodic episodes of somnolence but improving so no further neuro imaging done as felt likely Tramadol as cause. Patient was noted on 7/5 to have increased Cr from 1.4 to 1.7. Patient started on IVF's and Lisinopril held. Urine studies consistent with prerenal azotemia and patient responded to IVF's with improvement in creatinine to 1.3 on 7/6. IVF's discontinued on 7/6. PT/OT evaluated patient and recommends SNF. Patient on 7/6 much more alert and responsive and family reports patient is back to cognitive baseline. Social work and  working on SNF placement as patient is medically stable for discharge to a skilled nursing facility. Wife decided she wanted patient to return to [facility] so consult placed on 7/7. Patient restarted on Lisinopril for his heart failure and HTN as patient's BP was getting elevated with -180 off of Lisinopril and DASIA had resolved. Patient's daughter and wife wishes for patient to go to [place] for skilled care and continued hospitalization but patient had [employer] insurance of [place] that does not cover care in [place]. Family was very demanding and wanting patient in [place] but patient was not medically ready for discharge to a home setting as both PT and OT recommending patient needed further inpatient rehab at SNF level and not safe for home discharge. Patient's wife decided against my  medical advice to take patient home and patient's wife, Sirisha signed patient out of the hospital against medical advice on 7/9/2017. No prescriptions given to wife as she signed him out of hospital against my advice or was any outpatient rehab arranged or home health due to AMA status.     Consults:   Consults         Status Ordering Provider     Inpatient consult to Orthopedic Surgery  Once     Provider:  (Not yet assigned)    Completed YUNIOR QUINTEROS          Significant Diagnostic Studies: Labs:   CMP   Recent Labs  Lab 07/08/17  0452 07/09/17  0337    140   K 3.9 4.1    104   CO2 26 27   * 156*   BUN 28* 31*   CREATININE 1.1 1.2   CALCIUM 9.6 9.5   ANIONGAP 8 9   ESTGFRAFRICA >60.0 >60.0   EGFRNONAA >60.0 55.2*   , CBC   Recent Labs  Lab 07/08/17  0452   WBC 11.48   HGB 13.6*   HCT 40.4       and A1C:   Recent Labs  Lab 06/22/17  0017   HGBA1C 9.8*     Blood Sugars (AccuCheck):  Recent Labs      07/07/17   2115  07/08/17   0809  07/08/17   1259  07/08/17   1730  07/08/17   2215  07/09/17   0821   POCTGLUCOSE  260*  216*  208*  214*  199*  143*     Results for orders placed or performed during the hospital encounter of 07/03/17   WBC & Diff,Body Fluid Joint Fluid, Left Knee   Result Value Ref Range    Body Fluid Type Joint Fluid, Left Knee     Fluid Appearance Cloudy     Fluid Color Kimberlyn     WBC, Body Fluid 9400 /cu mm    Segs, Fluid 93 %    Lymphs, Fluid 1 %    Monocytes/Macrophages, Fluid 6 %      7/03/17   Body fluid crystal Joint Fluid, Left Knee   Intra and extracellular urate crystals present    7/03/2017  CT Head Without Contrast   Evolving right thalamic infarction.  No detrimental interval change from prior examination.      Pending Diagnostic Studies:     None        Final Active Diagnoses:    Diagnosis Date Noted POA    PRINCIPAL PROBLEM:  Acute encephalopathy [G93.40] 07/03/2017 Yes    Type 2 diabetes mellitus with hyperglycemia, with long-term current use of insulin  [E11.65, Z79.4] 07/04/2017 Not Applicable    Acute gout due to renal impairment involving left knee [M10.362] 07/03/2017 Yes    Late onset Alzheimer's disease without behavioral disturbance [G30.1, F02.80] 11/28/2016 Yes    Essential hypertension [I10] 06/22/2017 Yes    Hemiparesis affecting left side as late effect of cerebrovascular accident [I69.354] 06/22/2017 Not Applicable    Chronic combined systolic and diastolic heart failure [I50.42] 06/22/2017 Yes    Stage 3 chronic kidney disease [N18.3] 07/08/2017 Yes    Type 2 diabetes mellitus with stage 3 chronic kidney disease, with long-term current use of insulin [E11.22, N18.3, Z79.4] 11/29/2016 Not Applicable    Type 2 diabetes mellitus with diabetic polyneuropathy, with long-term current use of insulin [E11.42, Z79.4] 11/28/2016 Not Applicable      Problems Resolved During this Admission:    Diagnosis Date Noted Date Resolved POA    Acute renal failure superimposed on stage 3 chronic kidney disease [N17.9, N18.3] 07/30/2013 07/08/2017 Yes      Type 2 diabetes mellitus with hyperglycemia, with long-term current use of insulin    Type 2 diabetes mellitus with diabetic polyneuropathy, with long-term current use of insulin  Type 2 diabetes mellitus with stage 3 chronic kidney disease, with long-term current use of insulin  Patient with several episodes of hypoglycemia on 7/7 to as low as 42 and insulin dosages decreased and patient with no further hypoglycemia since that time. Patient eating 50% of meals. HgA1C 9.8% and not at goal on 6/22/2017. Patient to resume home dosage of his insulin on discharge and wife instructed to give insulin at home as she was doing prior to this recent hospitalization but no new scripts were given to wife as she signed him out of hospital against my medical advice. Patient at baseline renal function with creatinine 1.1-1.2.         * Acute encephalopathy    Resolved. Patient with no further somnolence and much more alert but  confused and has baseline dementia and according to wife back to baseline cognitive function. Somnolence likely due to delayed clearance of Tramadol in setting of advanced age and CKD that has now cleared his system.  Avoid use of Tramadol in future and just use Tylenol for pain. No further neuro imaging needed at this time as clinically back to neurologic baseline that patient had at time of recent stroke. No evidence to suggest metabolic cause as BUN only minimally elevated and sodium and calcium normal. Patient with no fever, tachycardia or leukocytosis to suggest infectious cause.           Acute gout due to renal impairment involving left knee    Improved. Likely due to reduced uric acid clearance in CKD. No dietary risk factors. Given advanced age and insulin-dependent diabetes cannot use NSAIDS to treat; Patient treated for acute gout flare with short course of Prednisone 40mg daily x 5 days while in hospital with good clinical improvement and joint effusion in left knee resolved and pain in knee much improved at time of release from hospital. Patient discharged on no meds for further treatment as signed out AMA.           Late onset Alzheimer's disease without behavioral disturbance    Patient with baseline dementia and unable to make his own medical decisions. Patient likely more susceptible to effects of sedating medications.  Avoid tramadol in future. Patient to resume his home medication of Namenda and Aricept to treat dementia as was taking at home previously. No new script given due to AMA discharge.           Essential hypertension    Patient's blood pressure improved. Goal for blood pressure is SBP < 140 and DBP < 90 as patient > or = 60 years of age and patient has advanced chronic kidney disease based on JNC 8 guidelines. Patient to continue home meds of Amlodipine and Toprol XL and Lisinopril 5 mg po daily that patient was on prior to this admit. No new script given due to AMA discharge.            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 Speech therapy re-evaluated patient on 7/6 and okay to advance diet from pureed diet with thin liquids to dental soft diet with thin liquids. Patient will need SNF placement on discharge as recommended by both PT and OT and ST.   Wife signed patient out against medical advice and patient is a high fall risk as has significant deficit to left side with only 1/5 strength to left arm and 2/5 strength to left leg. Patient is total care and needs 24 hours supervision as patient has poor awareness of his deficit and impulsive and that is likely related to his dementia and lack of understanding of his deficit. Patient is a very high fall risk and wife is aware that he needs additional aggressive rehab if he is to try and regain any function to the left side of his body but since he was signed out against medical advice no arrangements made for any outpatient rehab or medical equipment.           Chronic combined systolic and diastolic heart failure    Controlled. Patient without evidence of decompensation presently. Patient to continue prior home meds of continue Metoprolol XL and Lisinopril 5 mg po daily to treat. No new script given due to AMA discharge.                 Discharged Condition: Against medical advice    Disposition: Left Against Medical Advice    Follow Up:     Future Appointments  Date Time Provider Department Center   7/25/2017 11:00 AM Chayo Barragan MD [facility]       Medications:  Reconciled Home Medications:   Discharge Medication List as of 7/9/2017 12:01 PM      CONTINUE these medications which have NOT CHANGED    Details   amlodipine (NORVASC) 10 MG tablet Take 10 mg by mouth once daily., Until Discontinued, Historical Med      aspirin 81 MG Chew Take 81 mg by mouth once daily., Until Discontinued,  "Historical Med      atorvastatin (LIPITOR) 40 MG tablet Take 1 tablet (40 mg total) by mouth once daily., Starting 12/1/2016, Until Fri 12/1/17, Normal      blood sugar diagnostic Strp Monitors blood sugar before meals and bedtime; disp with lancets; One Touch Verio glucometer, Print      donepezil (ARICEPT) 10 MG tablet Take 10 mg by mouth once daily., Until Discontinued, Historical Med      insulin aspart (NOVOLOG FLEXPEN) 100 unit/mL InPn pen Inject 14 Units into the skin 3 (three) times daily with meals., Starting 12/1/2016, Until Fri 12/1/17, Normal      insulin degludec (TRESIBA FLEXTOUCH U-200) 200 unit/mL (3 mL) InPn Inject 80 Units into the skin once daily., Starting 12/1/2016, Until Discontinued, No Print      lisinopril (PRINIVIL,ZESTRIL) 5 MG tablet Take 1 tablet (5 mg total) by mouth once daily., Starting 12/1/2016, Until Fri 12/1/17, Normal      memantine (NAMENDA) 10 MG Tab Take 10 mg by mouth 2 (two) times daily., Until Discontinued, Historical Med      metoprolol succinate (TOPROL-XL) 25 MG 24 hr tablet Take 1 tablet (25 mg total) by mouth once daily., Starting 12/1/2016, Until Fri 12/1/17, Normal      pen needle, diabetic (BD ULTRA-FINE TARYN PEN NEEDLES) 32 gauge x 5/32" Ndle Uses 5 daily, on multiple daily insulin injections, Print              Time spent on the discharge of patient: 32 minutes    Unique De Jesus MD  Department of Hospital Medicine  [facility]  "

## 2017-07-11 LAB — BACTERIA SPEC ANAEROBE CULT: NORMAL

## 2017-07-21 NOTE — PT/OT/SLP DISCHARGE
Physical Therapy Discharge Summary    Emmett Perez  MRN: [id number]   Acute encephalopathy     Patient Discharged from acute Physical Therapy on 2017.  Please refer to prior PT noted date on 2017 for functional status.     Assessment:   Patient was discharge unexpectedly.  Information required to complete and accurate discharge summary is unknown.  Refer to therapy initial evaluation and last progress note for initial and most recent functional status and goal achievement.  Recommendations made may be found in medical record. Patient left hospital AMA.     GOALS:    Physical Therapy Goals        Problem: Physical Therapy Goal    Goal Priority Disciplines Outcome Goal Variances Interventions   Physical Therapy Goal     PT/OT, PT Ongoing (interventions implemented as appropriate)     Description:  Goals to be met by: 17     Patient will increase functional independence with mobility by performin. Supine to sit with MInimal Assistance  2. Sit to supine with MInimal Assistance  3. Rolling to Left and Right with Contact Guard Assistance.  4. Sit to stand transfer with Moderate Assistance using RW  5. Bed to chair transfer with Moderate Assistance using Rolling Walker  6. Gait  x 20 feet with Moderate Assistance using Rolling Walker.   7. Sitting at edge of bed x10 minutes with Supervision  8. Lower extremity exercise program x20 reps per handout, with assistance as needed                    Reasons for Discontinuation of Therapy Services  Pt left hospital AMA.       Plan:  Patient Discharged to: home with wife.

## 2017-08-09 LAB — FUNGUS SPEC CULT: NORMAL

## 2017-09-05 LAB
ACID FAST MOD KINY STN SPEC: NORMAL
MYCOBACTERIUM SPEC QL CULT: NORMAL

## 2017-09-21 NOTE — PT/OT/SLP DISCHARGE
Occupational Therapy Discharge Summary    Emmett Perez  MRN: [id number]   Acute encephalopathy   Patient Discharged from acute Occupational Therapy on 7/9/17.  Please refer to prior OT note dated on 7/9/17 for functional status.     Assessment:   Patient was discharge unexpectedly.  Information required to complete and accurate discharge summary is unknown.  Refer to therapy initial evaluation and last progress note for initial and most recent functional status and goal achievement.  Recommendations made may be found in medical record.  GOALS:    Occupational Therapy Goals        Problem: Occupational Therapy Goal    Goal Priority Disciplines Outcome Interventions   Occupational Therapy Goal     OT, PT/OT Ongoing (interventions implemented as appropriate)    Description:  Goals to be met by: 7/20/17     Patient will increase functional independence with ADLs by performing:    Feeding with Minimal Assistance.  UE Dressing with Minimal Assistance.  Grooming while EOB with Minimal Assistance.  Sitting at edge of bed x2 minutes with Contact Guard Assistance.  Rolling to Right, Left with Minimal Assistance.   Supine to sit with Minimal Assistance.  Toilet transfer to bedside commode with Moderate Assistance.  Upper extremity AAROM/PROM exercise program x10 reps per handout and visual demonstration, with assistance as needed.                    Reasons for Discontinuation of Therapy Services  Transfer to alternate level of care.      Plan:  Patient Discharged to: home with wife.

## 2020-01-10 NOTE — ASSESSMENT & PLAN NOTE
----- Message from Chris BETTY Darlene sent at 1/10/2020  8:28 AM CST -----  Regarding: Lab Client Services  Contact: [phone]  Good Morning,    My name is Chris Colon I work in the Lab Client Services. We had a problem with some lab work on this patient. If someone from your office could call us at [phone] or ext. [phone] that would be great. Anyone in my department can help.      Thank you     2/2 stroke
